# Patient Record
Sex: FEMALE | Race: WHITE | NOT HISPANIC OR LATINO | Employment: OTHER | URBAN - NONMETROPOLITAN AREA
[De-identification: names, ages, dates, MRNs, and addresses within clinical notes are randomized per-mention and may not be internally consistent; named-entity substitution may affect disease eponyms.]

---

## 2017-01-18 ENCOUNTER — TRANSCRIBE ORDERS (OUTPATIENT)
Dept: ADMINISTRATIVE | Facility: HOSPITAL | Age: 72
End: 2017-01-18

## 2017-01-18 DIAGNOSIS — Z85.72 PERSONAL HISTORY OF MALIGNANT LYMPHOMA: Primary | ICD-10-CM

## 2017-02-02 ENCOUNTER — HOSPITAL ENCOUNTER (OUTPATIENT)
Facility: HOSPITAL | Age: 72
Setting detail: SURGERY ADMIT
End: 2017-02-02
Attending: ORTHOPAEDIC SURGERY | Admitting: ORTHOPAEDIC SURGERY

## 2017-02-02 ENCOUNTER — HOSPITAL ENCOUNTER (INPATIENT)
Facility: HOSPITAL | Age: 72
LOS: 7 days | Discharge: HOME HEALTH | End: 2017-02-09
Attending: EMERGENCY MEDICINE | Admitting: ORTHOPAEDIC SURGERY

## 2017-02-02 ENCOUNTER — APPOINTMENT (OUTPATIENT)
Dept: GENERAL RADIOLOGY | Facility: HOSPITAL | Age: 72
End: 2017-02-02

## 2017-02-02 ENCOUNTER — APPOINTMENT (OUTPATIENT)
Dept: CT IMAGING | Facility: HOSPITAL | Age: 72
End: 2017-02-02

## 2017-02-02 ENCOUNTER — ANESTHESIA EVENT (OUTPATIENT)
Dept: PERIOP | Facility: HOSPITAL | Age: 72
End: 2017-02-02

## 2017-02-02 ENCOUNTER — ANESTHESIA (OUTPATIENT)
Dept: PERIOP | Facility: HOSPITAL | Age: 72
End: 2017-02-02

## 2017-02-02 DIAGNOSIS — Z74.09 IMPAIRED MOBILITY AND ADLS: ICD-10-CM

## 2017-02-02 DIAGNOSIS — Z74.09 IMPAIRED FUNCTIONAL MOBILITY, BALANCE, GAIT, AND ENDURANCE: ICD-10-CM

## 2017-02-02 DIAGNOSIS — Z78.9 IMPAIRED MOBILITY AND ADLS: ICD-10-CM

## 2017-02-02 DIAGNOSIS — S72.022A CLOSED DISPLACED FRACTURE OF PROXIMAL EPIPHYSIS OF LEFT FEMUR, INITIAL ENCOUNTER (HCC): Primary | ICD-10-CM

## 2017-02-02 DIAGNOSIS — S62.92XB FRACTURE OF LEFT HAND, OPEN, INITIAL ENCOUNTER: ICD-10-CM

## 2017-02-02 PROBLEM — S72.352A CLOSED DISPLACED COMMINUTED FRACTURE OF SHAFT OF LEFT FEMUR: Status: ACTIVE | Noted: 2017-02-02

## 2017-02-02 PROBLEM — S62.323A CLOSED DISPLACED FRACTURE OF SHAFT OF THIRD METACARPAL BONE OF LEFT HAND: Status: ACTIVE | Noted: 2017-02-02

## 2017-02-02 LAB
ABO GROUP BLD: NORMAL
ALBUMIN SERPL-MCNC: 4.1 G/DL (ref 3.5–5)
ALBUMIN/GLOB SERPL: 1.5 G/DL (ref 1.1–2.5)
ALP SERPL-CCNC: 75 U/L (ref 24–120)
ALT SERPL W P-5'-P-CCNC: 72 U/L (ref 0–54)
ANION GAP SERPL CALCULATED.3IONS-SCNC: 9 MMOL/L (ref 4–13)
APTT PPP: 23.5 SECONDS (ref 24.1–34.8)
AST SERPL-CCNC: 86 U/L (ref 7–45)
BASOPHILS # BLD AUTO: 0.01 10*3/MM3 (ref 0–0.2)
BASOPHILS NFR BLD AUTO: 0.1 % (ref 0–2)
BILIRUB SERPL-MCNC: 0.4 MG/DL (ref 0.1–1)
BLD GP AB SCN SERPL QL: NEGATIVE
BUN BLD-MCNC: 17 MG/DL (ref 5–21)
BUN/CREAT SERPL: 20.7 (ref 7–25)
CALCIUM SPEC-SCNC: 8.3 MG/DL (ref 8.4–10.4)
CHLORIDE SERPL-SCNC: 103 MMOL/L (ref 98–110)
CO2 SERPL-SCNC: 26 MMOL/L (ref 24–31)
CREAT BLD-MCNC: 0.82 MG/DL (ref 0.5–1.4)
DEPRECATED RDW RBC AUTO: 42.7 FL (ref 40–54)
EOSINOPHIL # BLD AUTO: 0.11 10*3/MM3 (ref 0–0.7)
EOSINOPHIL NFR BLD AUTO: 1.2 % (ref 0–4)
ERYTHROCYTE [DISTWIDTH] IN BLOOD BY AUTOMATED COUNT: 12.3 % (ref 12–15)
GFR SERPL CREATININE-BSD FRML MDRD: 69 ML/MIN/1.73
GLOBULIN UR ELPH-MCNC: 2.7 GM/DL
GLUCOSE BLD-MCNC: 103 MG/DL (ref 70–100)
HCT VFR BLD AUTO: 33.2 % (ref 37–47)
HGB BLD-MCNC: 11.2 G/DL (ref 12–16)
IMM GRANULOCYTES # BLD: 0.04 10*3/MM3 (ref 0–0.03)
IMM GRANULOCYTES NFR BLD: 0.4 % (ref 0–5)
INR PPP: 1 (ref 0.91–1.09)
LYMPHOCYTES # BLD AUTO: 1.64 10*3/MM3 (ref 0.72–4.86)
LYMPHOCYTES NFR BLD AUTO: 17.8 % (ref 15–45)
MCH RBC QN AUTO: 32.5 PG (ref 28–32)
MCHC RBC AUTO-ENTMCNC: 33.7 G/DL (ref 33–36)
MCV RBC AUTO: 96.2 FL (ref 82–98)
MONOCYTES # BLD AUTO: 0.57 10*3/MM3 (ref 0.19–1.3)
MONOCYTES NFR BLD AUTO: 6.2 % (ref 4–12)
NEUTROPHILS # BLD AUTO: 6.86 10*3/MM3 (ref 1.87–8.4)
NEUTROPHILS NFR BLD AUTO: 74.3 % (ref 39–78)
PLATELET # BLD AUTO: 218 10*3/MM3 (ref 130–400)
PMV BLD AUTO: 9.4 FL (ref 6–12)
POTASSIUM BLD-SCNC: 4.1 MMOL/L (ref 3.5–5.3)
PROT SERPL-MCNC: 6.8 G/DL (ref 6.3–8.7)
PROTHROMBIN TIME: 13.5 SECONDS (ref 11.9–14.6)
RBC # BLD AUTO: 3.45 10*6/MM3 (ref 4.2–5.4)
RH BLD: POSITIVE
SODIUM BLD-SCNC: 138 MMOL/L (ref 135–145)
WBC NRBC COR # BLD: 9.23 10*3/MM3 (ref 4.8–10.8)

## 2017-02-02 PROCEDURE — 25010000002 FENTANYL CITRATE (PF) 100 MCG/2ML SOLUTION: Performed by: ANESTHESIOLOGY

## 2017-02-02 PROCEDURE — 86900 BLOOD TYPING SEROLOGIC ABO: CPT

## 2017-02-02 PROCEDURE — C1713 ANCHOR/SCREW BN/BN,TIS/BN: HCPCS | Performed by: ORTHOPAEDIC SURGERY

## 2017-02-02 PROCEDURE — 25010000002 MIDAZOLAM PER 1 MG: Performed by: ANESTHESIOLOGY

## 2017-02-02 PROCEDURE — 85025 COMPLETE CBC W/AUTO DIFF WBC: CPT | Performed by: EMERGENCY MEDICINE

## 2017-02-02 PROCEDURE — 71010 HC CHEST PA OR AP: CPT

## 2017-02-02 PROCEDURE — 25010000002 ONDANSETRON PER 1 MG: Performed by: ANESTHESIOLOGY

## 2017-02-02 PROCEDURE — 25010000002 DEXAMETHASONE PER 1 MG: Performed by: ANESTHESIOLOGY

## 2017-02-02 PROCEDURE — 25010000002 ONDANSETRON PER 1 MG: Performed by: NURSE ANESTHETIST, CERTIFIED REGISTERED

## 2017-02-02 PROCEDURE — 99285 EMERGENCY DEPT VISIT HI MDM: CPT

## 2017-02-02 PROCEDURE — 73130 X-RAY EXAM OF HAND: CPT

## 2017-02-02 PROCEDURE — 73120 X-RAY EXAM OF HAND: CPT

## 2017-02-02 PROCEDURE — 25010000003 CEFAZOLIN PER 500 MG: Performed by: NURSE ANESTHETIST, CERTIFIED REGISTERED

## 2017-02-02 PROCEDURE — 25010000002 MORPHINE SULFATE (PF) 2 MG/ML SOLUTION: Performed by: ANESTHESIOLOGY

## 2017-02-02 PROCEDURE — 86920 COMPATIBILITY TEST SPIN: CPT

## 2017-02-02 PROCEDURE — 85730 THROMBOPLASTIN TIME PARTIAL: CPT | Performed by: EMERGENCY MEDICINE

## 2017-02-02 PROCEDURE — 25010000002 HYDROMORPHONE PER 4 MG

## 2017-02-02 PROCEDURE — 0QH906Z INSERTION OF INTRAMEDULLARY INTERNAL FIXATION DEVICE INTO LEFT FEMORAL SHAFT, OPEN APPROACH: ICD-10-PCS | Performed by: ORTHOPAEDIC SURGERY

## 2017-02-02 PROCEDURE — 86901 BLOOD TYPING SEROLOGIC RH(D): CPT

## 2017-02-02 PROCEDURE — 94799 UNLISTED PULMONARY SVC/PX: CPT

## 2017-02-02 PROCEDURE — 25010000002 PROPOFOL 10 MG/ML EMULSION: Performed by: NURSE ANESTHETIST, CERTIFIED REGISTERED

## 2017-02-02 PROCEDURE — 25010000002 HYDROMORPHONE PER 4 MG: Performed by: ANESTHESIOLOGY

## 2017-02-02 PROCEDURE — 85610 PROTHROMBIN TIME: CPT | Performed by: EMERGENCY MEDICINE

## 2017-02-02 PROCEDURE — 93005 ELECTROCARDIOGRAM TRACING: CPT | Performed by: EMERGENCY MEDICINE

## 2017-02-02 PROCEDURE — 70450 CT HEAD/BRAIN W/O DYE: CPT

## 2017-02-02 PROCEDURE — 80053 COMPREHEN METABOLIC PANEL: CPT | Performed by: EMERGENCY MEDICINE

## 2017-02-02 PROCEDURE — 0PSQXZZ REPOSITION LEFT METACARPAL, EXTERNAL APPROACH: ICD-10-PCS | Performed by: ORTHOPAEDIC SURGERY

## 2017-02-02 PROCEDURE — 86850 RBC ANTIBODY SCREEN: CPT

## 2017-02-02 PROCEDURE — 93010 ELECTROCARDIOGRAM REPORT: CPT | Performed by: INTERNAL MEDICINE

## 2017-02-02 PROCEDURE — 25010000002 FENTANYL CITRATE (PF) 100 MCG/2ML SOLUTION: Performed by: NURSE ANESTHETIST, CERTIFIED REGISTERED

## 2017-02-02 PROCEDURE — 73552 X-RAY EXAM OF FEMUR 2/>: CPT

## 2017-02-02 PROCEDURE — 72170 X-RAY EXAM OF PELVIS: CPT

## 2017-02-02 PROCEDURE — 76000 FLUOROSCOPY <1 HR PHYS/QHP: CPT

## 2017-02-02 PROCEDURE — 25010000002 HYDROMORPHONE PER 4 MG: Performed by: EMERGENCY MEDICINE

## 2017-02-02 PROCEDURE — 25010000002 SUCCINYLCHOLINE PER 20 MG: Performed by: NURSE ANESTHETIST, CERTIFIED REGISTERED

## 2017-02-02 PROCEDURE — 36415 COLL VENOUS BLD VENIPUNCTURE: CPT | Performed by: EMERGENCY MEDICINE

## 2017-02-02 PROCEDURE — 25010000003 MORPHINE PER 10 MG: Performed by: ORTHOPAEDIC SURGERY

## 2017-02-02 DEVICE — SCRW LK STRDRV TI 5X36MM STRL: Type: IMPLANTABLE DEVICE | Site: HIP | Status: FUNCTIONAL

## 2017-02-02 DEVICE — SCRW LK STRDRV TI 5X40M STRL: Type: IMPLANTABLE DEVICE | Site: HIP | Status: FUNCTIONAL

## 2017-02-02 DEVICE — IMPLANTABLE DEVICE: Type: IMPLANTABLE DEVICE | Site: HIP | Status: FUNCTIONAL

## 2017-02-02 RX ORDER — DEXAMETHASONE SODIUM PHOSPHATE 4 MG/ML
4 INJECTION, SOLUTION INTRA-ARTICULAR; INTRALESIONAL; INTRAMUSCULAR; INTRAVENOUS; SOFT TISSUE ONCE AS NEEDED
Status: COMPLETED | OUTPATIENT
Start: 2017-02-02 | End: 2017-02-02

## 2017-02-02 RX ORDER — MAGNESIUM HYDROXIDE 1200 MG/15ML
LIQUID ORAL AS NEEDED
Status: DISCONTINUED | OUTPATIENT
Start: 2017-02-02 | End: 2017-02-02 | Stop reason: HOSPADM

## 2017-02-02 RX ORDER — ROCURONIUM BROMIDE 10 MG/ML
INJECTION, SOLUTION INTRAVENOUS AS NEEDED
Status: DISCONTINUED | OUTPATIENT
Start: 2017-02-02 | End: 2017-02-02 | Stop reason: SURG

## 2017-02-02 RX ORDER — MEPERIDINE HYDROCHLORIDE 25 MG/ML
12.5 INJECTION INTRAMUSCULAR; INTRAVENOUS; SUBCUTANEOUS
Status: DISCONTINUED | OUTPATIENT
Start: 2017-02-02 | End: 2017-02-02 | Stop reason: HOSPADM

## 2017-02-02 RX ORDER — SUCCINYLCHOLINE CHLORIDE 20 MG/ML
INJECTION INTRAMUSCULAR; INTRAVENOUS AS NEEDED
Status: DISCONTINUED | OUTPATIENT
Start: 2017-02-02 | End: 2017-02-02 | Stop reason: SURG

## 2017-02-02 RX ORDER — ZOLPIDEM TARTRATE 5 MG/1
5 TABLET ORAL NIGHTLY PRN
Status: DISCONTINUED | OUTPATIENT
Start: 2017-02-02 | End: 2017-02-09 | Stop reason: HOSPADM

## 2017-02-02 RX ORDER — FENTANYL CITRATE 50 UG/ML
INJECTION, SOLUTION INTRAMUSCULAR; INTRAVENOUS AS NEEDED
Status: DISCONTINUED | OUTPATIENT
Start: 2017-02-02 | End: 2017-02-02 | Stop reason: SURG

## 2017-02-02 RX ORDER — ONDANSETRON 2 MG/ML
4 INJECTION INTRAMUSCULAR; INTRAVENOUS ONCE AS NEEDED
Status: COMPLETED | OUTPATIENT
Start: 2017-02-02 | End: 2017-02-02

## 2017-02-02 RX ORDER — MORPHINE SULFATE 2 MG/ML
2 INJECTION, SOLUTION INTRAMUSCULAR; INTRAVENOUS
Status: DISCONTINUED | OUTPATIENT
Start: 2017-02-02 | End: 2017-02-02 | Stop reason: HOSPADM

## 2017-02-02 RX ORDER — SODIUM CHLORIDE, SODIUM LACTATE, POTASSIUM CHLORIDE, CALCIUM CHLORIDE 600; 310; 30; 20 MG/100ML; MG/100ML; MG/100ML; MG/100ML
9 INJECTION, SOLUTION INTRAVENOUS CONTINUOUS
Status: DISCONTINUED | OUTPATIENT
Start: 2017-02-02 | End: 2017-02-09 | Stop reason: HOSPADM

## 2017-02-02 RX ORDER — SODIUM CHLORIDE 0.9 % (FLUSH) 0.9 %
1-10 SYRINGE (ML) INJECTION AS NEEDED
Status: DISCONTINUED | OUTPATIENT
Start: 2017-02-02 | End: 2017-02-02 | Stop reason: HOSPADM

## 2017-02-02 RX ORDER — HYDRALAZINE HYDROCHLORIDE 20 MG/ML
5 INJECTION INTRAMUSCULAR; INTRAVENOUS
Status: DISCONTINUED | OUTPATIENT
Start: 2017-02-02 | End: 2017-02-02 | Stop reason: HOSPADM

## 2017-02-02 RX ORDER — PHENYLEPHRINE HCL IN 0.9% NACL 0.8MG/10ML
SYRINGE (ML) INTRAVENOUS AS NEEDED
Status: DISCONTINUED | OUTPATIENT
Start: 2017-02-02 | End: 2017-02-02 | Stop reason: SURG

## 2017-02-02 RX ORDER — DOCUSATE SODIUM 100 MG/1
100 CAPSULE, LIQUID FILLED ORAL 2 TIMES DAILY PRN
Status: DISCONTINUED | OUTPATIENT
Start: 2017-02-02 | End: 2017-02-09 | Stop reason: HOSPADM

## 2017-02-02 RX ORDER — CARVEDILOL 3.12 MG/1
3.12 TABLET ORAL EVERY 12 HOURS SCHEDULED
Status: DISCONTINUED | OUTPATIENT
Start: 2017-02-02 | End: 2017-02-09 | Stop reason: HOSPADM

## 2017-02-02 RX ORDER — SPIRONOLACTONE 25 MG/1
25 TABLET ORAL DAILY
COMMUNITY
End: 2020-08-03

## 2017-02-02 RX ORDER — NALOXONE HCL 0.4 MG/ML
0.1 VIAL (ML) INJECTION
Status: DISCONTINUED | OUTPATIENT
Start: 2017-02-02 | End: 2017-02-09 | Stop reason: HOSPADM

## 2017-02-02 RX ORDER — LIDOCAINE HYDROCHLORIDE 40 MG/ML
SOLUTION TOPICAL AS NEEDED
Status: DISCONTINUED | OUTPATIENT
Start: 2017-02-02 | End: 2017-02-02 | Stop reason: SURG

## 2017-02-02 RX ORDER — SPIRONOLACTONE 25 MG/1
25 TABLET ORAL DAILY
Status: DISCONTINUED | OUTPATIENT
Start: 2017-02-03 | End: 2017-02-09 | Stop reason: HOSPADM

## 2017-02-02 RX ORDER — OXYCODONE AND ACETAMINOPHEN 10; 325 MG/1; MG/1
1 TABLET ORAL EVERY 4 HOURS PRN
Status: DISCONTINUED | OUTPATIENT
Start: 2017-02-02 | End: 2017-02-09 | Stop reason: HOSPADM

## 2017-02-02 RX ORDER — ZOLPIDEM TARTRATE 5 MG/1
5 TABLET ORAL NIGHTLY PRN
COMMUNITY
End: 2020-08-03 | Stop reason: SDUPTHER

## 2017-02-02 RX ORDER — LIDOCAINE HYDROCHLORIDE 20 MG/ML
INJECTION, SOLUTION INFILTRATION; PERINEURAL AS NEEDED
Status: DISCONTINUED | OUTPATIENT
Start: 2017-02-02 | End: 2017-02-02 | Stop reason: SURG

## 2017-02-02 RX ORDER — ONDANSETRON 2 MG/ML
INJECTION INTRAMUSCULAR; INTRAVENOUS AS NEEDED
Status: DISCONTINUED | OUTPATIENT
Start: 2017-02-02 | End: 2017-02-02 | Stop reason: SURG

## 2017-02-02 RX ORDER — MIDAZOLAM HYDROCHLORIDE 1 MG/ML
1 INJECTION INTRAMUSCULAR; INTRAVENOUS
Status: DISCONTINUED | OUTPATIENT
Start: 2017-02-02 | End: 2017-02-02 | Stop reason: HOSPADM

## 2017-02-02 RX ORDER — OXYCODONE AND ACETAMINOPHEN 10; 325 MG/1; MG/1
1 TABLET ORAL EVERY 4 HOURS PRN
COMMUNITY
End: 2017-02-09 | Stop reason: HOSPADM

## 2017-02-02 RX ORDER — FENTANYL CITRATE 50 UG/ML
25 INJECTION, SOLUTION INTRAMUSCULAR; INTRAVENOUS
Status: DISCONTINUED | OUTPATIENT
Start: 2017-02-02 | End: 2017-02-02 | Stop reason: HOSPADM

## 2017-02-02 RX ORDER — LISINOPRIL 5 MG/1
5 TABLET ORAL
Status: DISCONTINUED | OUTPATIENT
Start: 2017-02-03 | End: 2017-02-09 | Stop reason: HOSPADM

## 2017-02-02 RX ORDER — MORPHINE SULFATE 1 MG/ML
INJECTION, SOLUTION INTRAVENOUS CONTINUOUS
Status: DISCONTINUED | OUTPATIENT
Start: 2017-02-02 | End: 2017-02-06

## 2017-02-02 RX ORDER — WARFARIN SODIUM 3 MG/1
3 TABLET ORAL
Status: DISCONTINUED | OUTPATIENT
Start: 2017-02-02 | End: 2017-02-04

## 2017-02-02 RX ORDER — NALOXONE HCL 0.4 MG/ML
0.4 VIAL (ML) INJECTION AS NEEDED
Status: DISCONTINUED | OUTPATIENT
Start: 2017-02-02 | End: 2017-02-02 | Stop reason: HOSPADM

## 2017-02-02 RX ORDER — CEFAZOLIN SODIUM 1 G/3ML
INJECTION, POWDER, FOR SOLUTION INTRAMUSCULAR; INTRAVENOUS AS NEEDED
Status: DISCONTINUED | OUTPATIENT
Start: 2017-02-02 | End: 2017-02-02 | Stop reason: SURG

## 2017-02-02 RX ORDER — CARVEDILOL 12.5 MG/1
1 TABLET ORAL DAILY
COMMUNITY
End: 2022-01-04 | Stop reason: ALTCHOICE

## 2017-02-02 RX ORDER — ONDANSETRON 2 MG/ML
4 INJECTION INTRAMUSCULAR; INTRAVENOUS EVERY 6 HOURS PRN
Status: DISCONTINUED | OUTPATIENT
Start: 2017-02-02 | End: 2017-02-09 | Stop reason: HOSPADM

## 2017-02-02 RX ORDER — IPRATROPIUM BROMIDE AND ALBUTEROL SULFATE 2.5; .5 MG/3ML; MG/3ML
3 SOLUTION RESPIRATORY (INHALATION) ONCE AS NEEDED
Status: DISCONTINUED | OUTPATIENT
Start: 2017-02-02 | End: 2017-02-02 | Stop reason: HOSPADM

## 2017-02-02 RX ORDER — LABETALOL HYDROCHLORIDE 5 MG/ML
5 INJECTION, SOLUTION INTRAVENOUS
Status: DISCONTINUED | OUTPATIENT
Start: 2017-02-02 | End: 2017-02-02 | Stop reason: HOSPADM

## 2017-02-02 RX ORDER — SODIUM CHLORIDE, SODIUM LACTATE, POTASSIUM CHLORIDE, CALCIUM CHLORIDE 600; 310; 30; 20 MG/100ML; MG/100ML; MG/100ML; MG/100ML
50 INJECTION, SOLUTION INTRAVENOUS CONTINUOUS
Status: DISCONTINUED | OUTPATIENT
Start: 2017-02-02 | End: 2017-02-04

## 2017-02-02 RX ORDER — DEXTROSE MONOHYDRATE 25 G/50ML
12.5 INJECTION, SOLUTION INTRAVENOUS AS NEEDED
Status: DISCONTINUED | OUTPATIENT
Start: 2017-02-02 | End: 2017-02-02 | Stop reason: HOSPADM

## 2017-02-02 RX ORDER — LIDOCAINE HYDROCHLORIDE 10 MG/ML
10 INJECTION, SOLUTION INFILTRATION; PERINEURAL ONCE
Status: COMPLETED | OUTPATIENT
Start: 2017-02-02 | End: 2017-02-02

## 2017-02-02 RX ORDER — MIDAZOLAM HYDROCHLORIDE 1 MG/ML
2 INJECTION INTRAMUSCULAR; INTRAVENOUS
Status: DISCONTINUED | OUTPATIENT
Start: 2017-02-02 | End: 2017-02-02 | Stop reason: HOSPADM

## 2017-02-02 RX ORDER — PROPOFOL 10 MG/ML
VIAL (ML) INTRAVENOUS AS NEEDED
Status: DISCONTINUED | OUTPATIENT
Start: 2017-02-02 | End: 2017-02-02 | Stop reason: SURG

## 2017-02-02 RX ORDER — DIPHENHYDRAMINE HYDROCHLORIDE 50 MG/ML
12.5 INJECTION INTRAMUSCULAR; INTRAVENOUS
Status: DISCONTINUED | OUTPATIENT
Start: 2017-02-02 | End: 2017-02-02 | Stop reason: HOSPADM

## 2017-02-02 RX ORDER — FENTANYL CITRATE 50 UG/ML
25 INJECTION, SOLUTION INTRAMUSCULAR; INTRAVENOUS
Status: COMPLETED | OUTPATIENT
Start: 2017-02-02 | End: 2017-02-02

## 2017-02-02 RX ORDER — ACETAMINOPHEN 10 MG/ML
1000 INJECTION, SOLUTION INTRAVENOUS ONCE
Status: COMPLETED | OUTPATIENT
Start: 2017-02-02 | End: 2017-02-02

## 2017-02-02 RX ORDER — METOPROLOL TARTRATE 5 MG/5ML
2.5 INJECTION INTRAVENOUS
Status: DISCONTINUED | OUTPATIENT
Start: 2017-02-02 | End: 2017-02-02 | Stop reason: HOSPADM

## 2017-02-02 RX ADMIN — CEFAZOLIN 1 G: 1 INJECTION, POWDER, FOR SOLUTION INTRAMUSCULAR; INTRAVENOUS; PARENTERAL at 14:10

## 2017-02-02 RX ADMIN — ONDANSETRON HYDROCHLORIDE 4 MG: 2 SOLUTION INTRAMUSCULAR; INTRAVENOUS at 19:39

## 2017-02-02 RX ADMIN — SODIUM CHLORIDE, POTASSIUM CHLORIDE, SODIUM LACTATE AND CALCIUM CHLORIDE 9 ML/HR: 600; 310; 30; 20 INJECTION, SOLUTION INTRAVENOUS at 15:49

## 2017-02-02 RX ADMIN — DEXAMETHASONE SODIUM PHOSPHATE 4 MG: 4 INJECTION, SOLUTION INTRA-ARTICULAR; INTRALESIONAL; INTRAMUSCULAR; INTRAVENOUS; SOFT TISSUE at 15:50

## 2017-02-02 RX ADMIN — LIDOCAINE HYDROCHLORIDE 10 ML: 10 INJECTION, SOLUTION INFILTRATION; PERINEURAL at 14:35

## 2017-02-02 RX ADMIN — Medication 80 MCG: at 16:53

## 2017-02-02 RX ADMIN — MORPHINE SULFATE 2 MG: 2 INJECTION, SOLUTION INTRAMUSCULAR; INTRAVENOUS at 19:22

## 2017-02-02 RX ADMIN — HYDROMORPHONE HYDROCHLORIDE 0.2 MG: 1 INJECTION, SOLUTION INTRAMUSCULAR; INTRAVENOUS; SUBCUTANEOUS at 19:08

## 2017-02-02 RX ADMIN — LIDOCAINE HYDROCHLORIDE 1 EACH: 40 SOLUTION TOPICAL at 16:46

## 2017-02-02 RX ADMIN — SUCCINYLCHOLINE CHLORIDE 60 MG: 20 INJECTION, SOLUTION INTRAMUSCULAR; INTRAVENOUS at 16:45

## 2017-02-02 RX ADMIN — LIDOCAINE HYDROCHLORIDE 50 MG: 20 INJECTION, SOLUTION INFILTRATION; PERINEURAL at 16:44

## 2017-02-02 RX ADMIN — HYDROMORPHONE HYDROCHLORIDE 0.2 MG: 1 INJECTION, SOLUTION INTRAMUSCULAR; INTRAVENOUS; SUBCUTANEOUS at 19:13

## 2017-02-02 RX ADMIN — FENTANYL CITRATE 75 MCG: 50 INJECTION INTRAMUSCULAR; INTRAVENOUS at 16:44

## 2017-02-02 RX ADMIN — CEFAZOLIN 1 G: 1 INJECTION, POWDER, FOR SOLUTION INTRAVENOUS at 17:14

## 2017-02-02 RX ADMIN — SODIUM CHLORIDE, POTASSIUM CHLORIDE, SODIUM LACTATE AND CALCIUM CHLORIDE 50 ML/HR: 600; 310; 30; 20 INJECTION, SOLUTION INTRAVENOUS at 20:46

## 2017-02-02 RX ADMIN — PROPOFOL 100 MG: 10 INJECTION, EMULSION INTRAVENOUS at 16:44

## 2017-02-02 RX ADMIN — FENTANYL CITRATE 75 MCG: 50 INJECTION INTRAMUSCULAR; INTRAVENOUS at 17:15

## 2017-02-02 RX ADMIN — FENTANYL CITRATE 25 MCG: 50 INJECTION INTRAMUSCULAR; INTRAVENOUS at 19:41

## 2017-02-02 RX ADMIN — FENTANYL CITRATE 100 MCG: 50 INJECTION INTRAMUSCULAR; INTRAVENOUS at 17:50

## 2017-02-02 RX ADMIN — FENTANYL CITRATE 25 MCG: 50 INJECTION INTRAMUSCULAR; INTRAVENOUS at 19:33

## 2017-02-02 RX ADMIN — CEFAZOLIN SODIUM 1 G: 1 INJECTION, SOLUTION INTRAVENOUS at 23:38

## 2017-02-02 RX ADMIN — CARVEDILOL 3.12 MG: 3.12 TABLET, FILM COATED ORAL at 23:06

## 2017-02-02 RX ADMIN — Medication 80 MCG: at 17:00

## 2017-02-02 RX ADMIN — FENTANYL CITRATE 25 MCG: 50 INJECTION, SOLUTION INTRAMUSCULAR; INTRAVENOUS at 16:32

## 2017-02-02 RX ADMIN — Medication 80 MCG: at 17:04

## 2017-02-02 RX ADMIN — Medication 80 MCG: at 16:57

## 2017-02-02 RX ADMIN — Medication: at 20:45

## 2017-02-02 RX ADMIN — Medication 80 MCG: at 17:55

## 2017-02-02 RX ADMIN — MIDAZOLAM HYDROCHLORIDE 2 MG: 1 INJECTION, SOLUTION INTRAMUSCULAR; INTRAVENOUS at 15:50

## 2017-02-02 RX ADMIN — Medication 80 MCG: at 17:08

## 2017-02-02 RX ADMIN — Medication 80 MCG: at 17:10

## 2017-02-02 RX ADMIN — OXYCODONE HYDROCHLORIDE AND ACETAMINOPHEN 1 TABLET: 10; 325 TABLET ORAL at 23:06

## 2017-02-02 RX ADMIN — ACETAMINOPHEN 1000 MG: 10 INJECTION, SOLUTION INTRAVENOUS at 15:49

## 2017-02-02 RX ADMIN — ROCURONIUM BROMIDE 30 MG: 10 INJECTION INTRAVENOUS at 16:44

## 2017-02-02 RX ADMIN — FENTANYL CITRATE 25 MCG: 50 INJECTION INTRAMUSCULAR; INTRAVENOUS at 19:23

## 2017-02-02 RX ADMIN — FENTANYL CITRATE 25 MCG: 50 INJECTION INTRAMUSCULAR; INTRAVENOUS at 19:28

## 2017-02-02 RX ADMIN — HYDROMORPHONE HYDROCHLORIDE 1 MG: 1 INJECTION, SOLUTION INTRAMUSCULAR; INTRAVENOUS; SUBCUTANEOUS at 13:18

## 2017-02-02 RX ADMIN — WARFARIN SODIUM 3 MG: 3 TABLET ORAL at 21:51

## 2017-02-02 RX ADMIN — ONDANSETRON HYDROCHLORIDE 4 MG: 2 SOLUTION INTRAMUSCULAR; INTRAVENOUS at 18:15

## 2017-02-02 RX ADMIN — SODIUM CHLORIDE, POTASSIUM CHLORIDE, SODIUM LACTATE AND CALCIUM CHLORIDE: 600; 310; 30; 20 INJECTION, SOLUTION INTRAVENOUS at 18:16

## 2017-02-02 NOTE — ANESTHESIA PREPROCEDURE EVALUATION
Anesthesia Evaluation     Patient summary reviewed    No history of anesthetic complications   Airway   Mallampati: II  TM distance: >3 FB  Neck ROM: full  Dental    (+) upper dentures and lower dentures    Pulmonary    (-) asthma, sleep apnea, not a smoker  Cardiovascular   Exercise tolerance: good (4-7 METS)  (+) hypertension, CHF (h/o cardiomyopathy from chemo.  recovered to normal.),   (-) pacemaker, past MI, angina, cardiac stents    ECG reviewed  Patient on routine beta blocker and Beta blocker given within 24 hours of surgery    Neuro/Psych  (-) seizures, TIA, CVA  GI/Hepatic/Renal/Endo    (-) liver disease, renal disease, diabetes    Musculoskeletal     Abdominal    Substance History      OB/GYN          Other                             Anesthesia Plan    ASA 2 - emergent     general     intravenous induction   Anesthetic plan and risks discussed with patient.

## 2017-02-03 LAB
HCT VFR BLD AUTO: 22.4 % (ref 37–47)
HGB BLD-MCNC: 7.7 G/DL (ref 12–16)
INR PPP: 1.16 (ref 0.91–1.09)
PROTHROMBIN TIME: 15.2 SECONDS (ref 11.9–14.6)

## 2017-02-03 PROCEDURE — 36430 TRANSFUSION BLD/BLD COMPNT: CPT

## 2017-02-03 PROCEDURE — 85014 HEMATOCRIT: CPT | Performed by: ORTHOPAEDIC SURGERY

## 2017-02-03 PROCEDURE — 85018 HEMOGLOBIN: CPT | Performed by: ORTHOPAEDIC SURGERY

## 2017-02-03 PROCEDURE — G8978 MOBILITY CURRENT STATUS: HCPCS | Performed by: PHYSICAL THERAPIST

## 2017-02-03 PROCEDURE — 97530 THERAPEUTIC ACTIVITIES: CPT

## 2017-02-03 PROCEDURE — P9016 RBC LEUKOCYTES REDUCED: HCPCS

## 2017-02-03 PROCEDURE — 86900 BLOOD TYPING SEROLOGIC ABO: CPT

## 2017-02-03 PROCEDURE — 25010000003 CEFAZOLIN PER 500 MG: Performed by: ORTHOPAEDIC SURGERY

## 2017-02-03 PROCEDURE — 97162 PT EVAL MOD COMPLEX 30 MIN: CPT

## 2017-02-03 PROCEDURE — 85610 PROTHROMBIN TIME: CPT | Performed by: ORTHOPAEDIC SURGERY

## 2017-02-03 PROCEDURE — 97167 OT EVAL HIGH COMPLEX 60 MIN: CPT | Performed by: OCCUPATIONAL THERAPIST

## 2017-02-03 PROCEDURE — G8987 SELF CARE CURRENT STATUS: HCPCS | Performed by: OCCUPATIONAL THERAPIST

## 2017-02-03 PROCEDURE — G8988 SELF CARE GOAL STATUS: HCPCS | Performed by: OCCUPATIONAL THERAPIST

## 2017-02-03 PROCEDURE — G8979 MOBILITY GOAL STATUS: HCPCS | Performed by: PHYSICAL THERAPIST

## 2017-02-03 RX ADMIN — CEFAZOLIN SODIUM 1 G: 1 INJECTION, SOLUTION INTRAVENOUS at 08:45

## 2017-02-03 RX ADMIN — WARFARIN SODIUM 3 MG: 3 TABLET ORAL at 17:54

## 2017-02-03 RX ADMIN — LISINOPRIL 5 MG: 5 TABLET ORAL at 08:45

## 2017-02-03 RX ADMIN — ZOLPIDEM TARTRATE 5 MG: 5 TABLET, FILM COATED ORAL at 21:25

## 2017-02-03 RX ADMIN — OXYCODONE HYDROCHLORIDE AND ACETAMINOPHEN 1 TABLET: 10; 325 TABLET ORAL at 21:25

## 2017-02-03 RX ADMIN — CARVEDILOL 3.12 MG: 3.12 TABLET, FILM COATED ORAL at 08:45

## 2017-02-03 RX ADMIN — OXYCODONE HYDROCHLORIDE AND ACETAMINOPHEN 1 TABLET: 10; 325 TABLET ORAL at 10:02

## 2017-02-03 RX ADMIN — OXYCODONE HYDROCHLORIDE AND ACETAMINOPHEN 1 TABLET: 10; 325 TABLET ORAL at 16:45

## 2017-02-03 RX ADMIN — SPIRONOLACTONE 25 MG: 25 TABLET ORAL at 08:46

## 2017-02-03 RX ADMIN — OXYCODONE HYDROCHLORIDE AND ACETAMINOPHEN 1 TABLET: 10; 325 TABLET ORAL at 03:44

## 2017-02-03 NOTE — ANESTHESIA POSTPROCEDURE EVALUATION
Patient: Mitzi Villafuerte    Procedure Summary     Date Anesthesia Start Anesthesia Stop Room / Location    02/02/17 1639 1840 [facility] / [facility]       Procedure Diagnosis Surgeon Provider    HIP TROCANTERIC NAILING LONG WITH INTRAMEDULLARY HIP SCREW WITH CAST APPLICATION TO LEFT HAND (Left Hip) (FRACTURED LEFT HIP) MD Lucio Franco CRNA          Anesthesia Type: general  Last vitals  /60 (02/02/17 1935)    Temp      Pulse 91 (02/02/17 1935)   Resp 17 (02/02/17 1935)    SpO2 100 % (02/02/17 1935)      Post Anesthesia Care and Evaluation    Patient location during evaluation: PACU  Patient participation: complete - patient participated  Level of consciousness: awake  Pain score: 4  Pain management: adequate  Airway patency: patent  Anesthetic complications: No anesthetic complications  PONV Status: none  Cardiovascular status: acceptable  Respiratory status: acceptable  Hydration status: acceptable

## 2017-02-04 LAB
ABO + RH BLD: NORMAL
BH BB BLOOD EXPIRATION DATE: NORMAL
BH BB BLOOD TYPE BARCODE: 5100
BH BB DISPENSE STATUS: NORMAL
BH BB PRODUCT CODE: NORMAL
BH BB UNIT NUMBER: NORMAL
CROSSMATCH INTERPRETATION: NORMAL
HCT VFR BLD AUTO: 28.9 % (ref 37–47)
HGB BLD-MCNC: 9.8 G/DL (ref 12–16)
INR PPP: 1.69 (ref 0.91–1.09)
PROTHROMBIN TIME: 20.5 SECONDS (ref 11.9–14.6)
UNIT  ABO: NORMAL
UNIT  RH: NORMAL

## 2017-02-04 PROCEDURE — 97530 THERAPEUTIC ACTIVITIES: CPT

## 2017-02-04 PROCEDURE — 85610 PROTHROMBIN TIME: CPT | Performed by: ORTHOPAEDIC SURGERY

## 2017-02-04 PROCEDURE — 85014 HEMATOCRIT: CPT | Performed by: ORTHOPAEDIC SURGERY

## 2017-02-04 PROCEDURE — 86920 COMPATIBILITY TEST SPIN: CPT

## 2017-02-04 PROCEDURE — 85018 HEMOGLOBIN: CPT | Performed by: ORTHOPAEDIC SURGERY

## 2017-02-04 RX ORDER — WARFARIN SODIUM 1 MG/1
1 TABLET ORAL
Status: DISCONTINUED | OUTPATIENT
Start: 2017-02-04 | End: 2017-02-09 | Stop reason: HOSPADM

## 2017-02-04 RX ORDER — POLYETHYLENE GLYCOL 3350 17 G/17G
17 POWDER, FOR SOLUTION ORAL DAILY
Status: DISCONTINUED | OUTPATIENT
Start: 2017-02-04 | End: 2017-02-09 | Stop reason: HOSPADM

## 2017-02-04 RX ADMIN — SODIUM CHLORIDE, POTASSIUM CHLORIDE, SODIUM LACTATE AND CALCIUM CHLORIDE 50 ML/HR: 600; 310; 30; 20 INJECTION, SOLUTION INTRAVENOUS at 02:07

## 2017-02-04 RX ADMIN — OXYCODONE HYDROCHLORIDE AND ACETAMINOPHEN 1 TABLET: 10; 325 TABLET ORAL at 09:54

## 2017-02-04 RX ADMIN — OXYCODONE HYDROCHLORIDE AND ACETAMINOPHEN 1 TABLET: 10; 325 TABLET ORAL at 14:10

## 2017-02-04 RX ADMIN — OXYCODONE HYDROCHLORIDE AND ACETAMINOPHEN 1 TABLET: 10; 325 TABLET ORAL at 06:07

## 2017-02-04 RX ADMIN — OXYCODONE HYDROCHLORIDE AND ACETAMINOPHEN 1 TABLET: 10; 325 TABLET ORAL at 18:02

## 2017-02-04 RX ADMIN — SPIRONOLACTONE 25 MG: 25 TABLET ORAL at 09:05

## 2017-02-04 RX ADMIN — OXYCODONE HYDROCHLORIDE AND ACETAMINOPHEN 1 TABLET: 10; 325 TABLET ORAL at 02:07

## 2017-02-04 RX ADMIN — ZOLPIDEM TARTRATE 5 MG: 5 TABLET, FILM COATED ORAL at 20:56

## 2017-02-04 RX ADMIN — CARVEDILOL 3.12 MG: 3.12 TABLET, FILM COATED ORAL at 08:59

## 2017-02-04 RX ADMIN — WARFARIN SODIUM 1 MG: 1 TABLET ORAL at 18:02

## 2017-02-05 LAB
HCT VFR BLD AUTO: 28.4 % (ref 37–47)
HGB BLD-MCNC: 9.7 G/DL (ref 12–16)
INR PPP: 1.65 (ref 0.91–1.09)
PROTHROMBIN TIME: 20.1 SECONDS (ref 11.9–14.6)

## 2017-02-05 PROCEDURE — 25010000003 MORPHINE PER 10 MG: Performed by: ORTHOPAEDIC SURGERY

## 2017-02-05 PROCEDURE — 97116 GAIT TRAINING THERAPY: CPT

## 2017-02-05 PROCEDURE — 85610 PROTHROMBIN TIME: CPT | Performed by: ORTHOPAEDIC SURGERY

## 2017-02-05 PROCEDURE — 85018 HEMOGLOBIN: CPT | Performed by: ORTHOPAEDIC SURGERY

## 2017-02-05 PROCEDURE — 97530 THERAPEUTIC ACTIVITIES: CPT

## 2017-02-05 PROCEDURE — 85014 HEMATOCRIT: CPT | Performed by: ORTHOPAEDIC SURGERY

## 2017-02-05 RX ADMIN — OXYCODONE HYDROCHLORIDE AND ACETAMINOPHEN 1 TABLET: 10; 325 TABLET ORAL at 22:07

## 2017-02-05 RX ADMIN — CARVEDILOL 3.12 MG: 3.12 TABLET, FILM COATED ORAL at 08:45

## 2017-02-05 RX ADMIN — OXYCODONE HYDROCHLORIDE AND ACETAMINOPHEN 1 TABLET: 10; 325 TABLET ORAL at 17:50

## 2017-02-05 RX ADMIN — Medication: at 06:01

## 2017-02-05 RX ADMIN — OXYCODONE HYDROCHLORIDE AND ACETAMINOPHEN 1 TABLET: 10; 325 TABLET ORAL at 09:52

## 2017-02-05 RX ADMIN — ZOLPIDEM TARTRATE 5 MG: 5 TABLET, FILM COATED ORAL at 22:07

## 2017-02-05 RX ADMIN — OXYCODONE HYDROCHLORIDE AND ACETAMINOPHEN 1 TABLET: 10; 325 TABLET ORAL at 05:56

## 2017-02-05 RX ADMIN — POLYETHYLENE GLYCOL 3350 17 G: 17 POWDER, FOR SOLUTION ORAL at 08:44

## 2017-02-05 RX ADMIN — OXYCODONE HYDROCHLORIDE AND ACETAMINOPHEN 1 TABLET: 10; 325 TABLET ORAL at 14:15

## 2017-02-05 RX ADMIN — OXYCODONE HYDROCHLORIDE AND ACETAMINOPHEN 1 TABLET: 10; 325 TABLET ORAL at 02:00

## 2017-02-05 RX ADMIN — SPIRONOLACTONE 25 MG: 25 TABLET ORAL at 08:45

## 2017-02-05 RX ADMIN — WARFARIN SODIUM 1 MG: 1 TABLET ORAL at 17:49

## 2017-02-05 RX ADMIN — LISINOPRIL 5 MG: 5 TABLET ORAL at 08:45

## 2017-02-06 LAB
ABO + RH BLD: NORMAL
BH BB BLOOD EXPIRATION DATE: NORMAL
BH BB BLOOD TYPE BARCODE: 5100
BH BB DISPENSE STATUS: NORMAL
BH BB PRODUCT CODE: NORMAL
BH BB UNIT NUMBER: NORMAL
CROSSMATCH INTERPRETATION: NORMAL
INR PPP: 1.65 (ref 0.91–1.09)
PROTHROMBIN TIME: 20.1 SECONDS (ref 11.9–14.6)
UNIT  ABO: NORMAL
UNIT  RH: NORMAL

## 2017-02-06 PROCEDURE — 97116 GAIT TRAINING THERAPY: CPT | Performed by: PHYSICAL THERAPIST

## 2017-02-06 PROCEDURE — 97116 GAIT TRAINING THERAPY: CPT

## 2017-02-06 PROCEDURE — 97535 SELF CARE MNGMENT TRAINING: CPT

## 2017-02-06 PROCEDURE — 85610 PROTHROMBIN TIME: CPT | Performed by: ORTHOPAEDIC SURGERY

## 2017-02-06 PROCEDURE — 97530 THERAPEUTIC ACTIVITIES: CPT | Performed by: PHYSICAL THERAPIST

## 2017-02-06 RX ORDER — ESCITALOPRAM OXALATE 10 MG/1
10 TABLET ORAL DAILY
COMMUNITY
End: 2020-08-06

## 2017-02-06 RX ADMIN — SODIUM CHLORIDE, POTASSIUM CHLORIDE, SODIUM LACTATE AND CALCIUM CHLORIDE 9 ML/HR: 600; 310; 30; 20 INJECTION, SOLUTION INTRAVENOUS at 05:43

## 2017-02-06 RX ADMIN — ZOLPIDEM TARTRATE 5 MG: 5 TABLET, FILM COATED ORAL at 21:22

## 2017-02-06 RX ADMIN — OXYCODONE HYDROCHLORIDE AND ACETAMINOPHEN 1 TABLET: 10; 325 TABLET ORAL at 17:14

## 2017-02-06 RX ADMIN — CARVEDILOL 3.12 MG: 3.12 TABLET, FILM COATED ORAL at 08:37

## 2017-02-06 RX ADMIN — OXYCODONE HYDROCHLORIDE AND ACETAMINOPHEN 1 TABLET: 10; 325 TABLET ORAL at 03:01

## 2017-02-06 RX ADMIN — SPIRONOLACTONE 25 MG: 25 TABLET ORAL at 08:37

## 2017-02-06 RX ADMIN — OXYCODONE HYDROCHLORIDE AND ACETAMINOPHEN 1 TABLET: 10; 325 TABLET ORAL at 07:07

## 2017-02-06 RX ADMIN — CARVEDILOL 3.12 MG: 3.12 TABLET, FILM COATED ORAL at 21:22

## 2017-02-06 RX ADMIN — OXYCODONE HYDROCHLORIDE AND ACETAMINOPHEN 1 TABLET: 10; 325 TABLET ORAL at 13:07

## 2017-02-06 RX ADMIN — OXYCODONE HYDROCHLORIDE AND ACETAMINOPHEN 1 TABLET: 10; 325 TABLET ORAL at 21:22

## 2017-02-06 RX ADMIN — LISINOPRIL 5 MG: 5 TABLET ORAL at 08:37

## 2017-02-07 LAB
INR PPP: 1.54 (ref 0.91–1.09)
PROTHROMBIN TIME: 19 SECONDS (ref 11.9–14.6)

## 2017-02-07 PROCEDURE — 25010000002 ONDANSETRON PER 1 MG: Performed by: ORTHOPAEDIC SURGERY

## 2017-02-07 PROCEDURE — 97535 SELF CARE MNGMENT TRAINING: CPT

## 2017-02-07 PROCEDURE — 85610 PROTHROMBIN TIME: CPT | Performed by: ORTHOPAEDIC SURGERY

## 2017-02-07 PROCEDURE — 97116 GAIT TRAINING THERAPY: CPT

## 2017-02-07 RX ADMIN — OXYCODONE HYDROCHLORIDE AND ACETAMINOPHEN 1 TABLET: 10; 325 TABLET ORAL at 18:38

## 2017-02-07 RX ADMIN — OXYCODONE HYDROCHLORIDE AND ACETAMINOPHEN 1 TABLET: 10; 325 TABLET ORAL at 06:37

## 2017-02-07 RX ADMIN — WARFARIN SODIUM 1 MG: 1 TABLET ORAL at 17:29

## 2017-02-07 RX ADMIN — OXYCODONE HYDROCHLORIDE AND ACETAMINOPHEN 1 TABLET: 10; 325 TABLET ORAL at 22:28

## 2017-02-07 RX ADMIN — CARVEDILOL 3.12 MG: 3.12 TABLET, FILM COATED ORAL at 08:43

## 2017-02-07 RX ADMIN — OXYCODONE HYDROCHLORIDE AND ACETAMINOPHEN 1 TABLET: 10; 325 TABLET ORAL at 10:25

## 2017-02-07 RX ADMIN — SPIRONOLACTONE 25 MG: 25 TABLET ORAL at 08:43

## 2017-02-07 RX ADMIN — LISINOPRIL 5 MG: 5 TABLET ORAL at 08:42

## 2017-02-07 RX ADMIN — OXYCODONE HYDROCHLORIDE AND ACETAMINOPHEN 1 TABLET: 10; 325 TABLET ORAL at 14:30

## 2017-02-07 RX ADMIN — ONDANSETRON 4 MG: 2 INJECTION INTRAMUSCULAR; INTRAVENOUS at 08:42

## 2017-02-07 RX ADMIN — OXYCODONE HYDROCHLORIDE AND ACETAMINOPHEN 1 TABLET: 10; 325 TABLET ORAL at 02:47

## 2017-02-07 RX ADMIN — MAGNESIUM HYDROXIDE 10 ML: 2400 SUSPENSION ORAL at 06:37

## 2017-02-07 RX ADMIN — ZOLPIDEM TARTRATE 5 MG: 5 TABLET, FILM COATED ORAL at 22:28

## 2017-02-08 LAB
INR PPP: 1.32 (ref 0.91–1.09)
PROTHROMBIN TIME: 16.8 SECONDS (ref 11.9–14.6)

## 2017-02-08 PROCEDURE — 97116 GAIT TRAINING THERAPY: CPT

## 2017-02-08 PROCEDURE — 25010000002 MORPHINE SULFATE (PF) 2 MG/ML SOLUTION

## 2017-02-08 PROCEDURE — 25010000002 ONDANSETRON PER 1 MG: Performed by: ORTHOPAEDIC SURGERY

## 2017-02-08 PROCEDURE — 85610 PROTHROMBIN TIME: CPT | Performed by: ORTHOPAEDIC SURGERY

## 2017-02-08 RX ORDER — MORPHINE SULFATE 2 MG/ML
2 INJECTION, SOLUTION INTRAMUSCULAR; INTRAVENOUS EVERY 4 HOURS PRN
Status: DISCONTINUED | OUTPATIENT
Start: 2017-02-08 | End: 2017-02-09 | Stop reason: HOSPADM

## 2017-02-08 RX ADMIN — WARFARIN SODIUM 1 MG: 1 TABLET ORAL at 17:20

## 2017-02-08 RX ADMIN — OXYCODONE HYDROCHLORIDE AND ACETAMINOPHEN 1 TABLET: 10; 325 TABLET ORAL at 03:07

## 2017-02-08 RX ADMIN — OXYCODONE HYDROCHLORIDE AND ACETAMINOPHEN 1 TABLET: 10; 325 TABLET ORAL at 07:05

## 2017-02-08 RX ADMIN — OXYCODONE HYDROCHLORIDE AND ACETAMINOPHEN 1 TABLET: 10; 325 TABLET ORAL at 15:07

## 2017-02-08 RX ADMIN — MORPHINE SULFATE 2 MG: 2 INJECTION, SOLUTION INTRAMUSCULAR; INTRAVENOUS at 18:40

## 2017-02-08 RX ADMIN — ONDANSETRON 4 MG: 2 INJECTION INTRAMUSCULAR; INTRAVENOUS at 17:20

## 2017-02-08 RX ADMIN — ZOLPIDEM TARTRATE 5 MG: 5 TABLET, FILM COATED ORAL at 20:48

## 2017-02-08 RX ADMIN — OXYCODONE HYDROCHLORIDE AND ACETAMINOPHEN 1 TABLET: 10; 325 TABLET ORAL at 11:10

## 2017-02-08 RX ADMIN — CARVEDILOL 3.12 MG: 3.12 TABLET, FILM COATED ORAL at 20:48

## 2017-02-08 RX ADMIN — OXYCODONE HYDROCHLORIDE AND ACETAMINOPHEN 1 TABLET: 10; 325 TABLET ORAL at 20:48

## 2017-02-08 RX ADMIN — MAGNESIUM HYDROXIDE 10 ML: 2400 SUSPENSION ORAL at 07:06

## 2017-02-09 VITALS
OXYGEN SATURATION: 97 % | HEIGHT: 62 IN | HEART RATE: 74 BPM | DIASTOLIC BLOOD PRESSURE: 48 MMHG | RESPIRATION RATE: 18 BRPM | TEMPERATURE: 98.2 F | BODY MASS INDEX: 21.9 KG/M2 | SYSTOLIC BLOOD PRESSURE: 113 MMHG | WEIGHT: 119 LBS

## 2017-02-09 LAB
INR PPP: 1.14 (ref 0.91–1.09)
PROTHROMBIN TIME: 15 SECONDS (ref 11.9–14.6)

## 2017-02-09 PROCEDURE — 85610 PROTHROMBIN TIME: CPT | Performed by: ORTHOPAEDIC SURGERY

## 2017-02-09 PROCEDURE — 25010000002 MORPHINE SULFATE (PF) 2 MG/ML SOLUTION

## 2017-02-09 RX ORDER — OXYCODONE AND ACETAMINOPHEN 10; 325 MG/1; MG/1
1 TABLET ORAL EVERY 4 HOURS PRN
Qty: 40 TABLET | Refills: 0 | Status: SHIPPED | OUTPATIENT
Start: 2017-02-09 | End: 2020-08-03 | Stop reason: SDUPTHER

## 2017-02-09 RX ORDER — BISACODYL 10 MG
10 SUPPOSITORY, RECTAL RECTAL ONCE
Status: COMPLETED | OUTPATIENT
Start: 2017-02-09 | End: 2017-02-09

## 2017-02-09 RX ADMIN — SPIRONOLACTONE 25 MG: 25 TABLET ORAL at 08:28

## 2017-02-09 RX ADMIN — OXYCODONE HYDROCHLORIDE AND ACETAMINOPHEN 1 TABLET: 10; 325 TABLET ORAL at 00:30

## 2017-02-09 RX ADMIN — OXYCODONE HYDROCHLORIDE AND ACETAMINOPHEN 1 TABLET: 10; 325 TABLET ORAL at 10:24

## 2017-02-09 RX ADMIN — OXYCODONE HYDROCHLORIDE AND ACETAMINOPHEN 1 TABLET: 10; 325 TABLET ORAL at 06:16

## 2017-02-09 RX ADMIN — MORPHINE SULFATE 2 MG: 2 INJECTION, SOLUTION INTRAMUSCULAR; INTRAVENOUS at 03:11

## 2017-02-09 RX ADMIN — BISACODYL 10 MG: 10 SUPPOSITORY RECTAL at 11:00

## 2017-02-27 ENCOUNTER — LAB REQUISITION (OUTPATIENT)
Dept: LAB | Facility: HOSPITAL | Age: 72
End: 2017-02-27

## 2017-02-27 DIAGNOSIS — Z00.00 ENCOUNTER FOR GENERAL ADULT MEDICAL EXAMINATION WITHOUT ABNORMAL FINDINGS: ICD-10-CM

## 2017-02-27 LAB
ALBUMIN SERPL-MCNC: 3.8 G/DL (ref 3.5–5)
ALBUMIN/GLOB SERPL: 1.5 G/DL (ref 1.1–2.5)
ALP SERPL-CCNC: 114 U/L (ref 24–120)
ALT SERPL W P-5'-P-CCNC: 26 U/L (ref 0–54)
ANION GAP SERPL CALCULATED.3IONS-SCNC: 10 MMOL/L (ref 4–13)
AST SERPL-CCNC: 24 U/L (ref 7–45)
BASOPHILS # BLD AUTO: 0.01 10*3/MM3 (ref 0–0.2)
BASOPHILS NFR BLD AUTO: 0.3 % (ref 0–2)
BILIRUB SERPL-MCNC: 0.5 MG/DL (ref 0.1–1)
BUN BLD-MCNC: 17 MG/DL (ref 5–21)
BUN/CREAT SERPL: 29.8 (ref 7–25)
CALCIUM SPEC-SCNC: 9.6 MG/DL (ref 8.4–10.4)
CHLORIDE SERPL-SCNC: 101 MMOL/L (ref 98–110)
CO2 SERPL-SCNC: 27 MMOL/L (ref 24–31)
CREAT BLD-MCNC: 0.57 MG/DL (ref 0.5–1.4)
DEPRECATED RDW RBC AUTO: 47.1 FL (ref 40–54)
EOSINOPHIL # BLD AUTO: 0.17 10*3/MM3 (ref 0–0.7)
EOSINOPHIL NFR BLD AUTO: 4.3 % (ref 0–4)
ERYTHROCYTE [DISTWIDTH] IN BLOOD BY AUTOMATED COUNT: 13.5 % (ref 12–15)
GFR SERPL CREATININE-BSD FRML MDRD: 105 ML/MIN/1.73
GLOBULIN UR ELPH-MCNC: 2.5 GM/DL
GLUCOSE BLD-MCNC: 107 MG/DL (ref 70–100)
HCT VFR BLD AUTO: 33.7 % (ref 37–47)
HGB BLD-MCNC: 11 G/DL (ref 12–16)
IMM GRANULOCYTES # BLD: 0.02 10*3/MM3 (ref 0–0.03)
IMM GRANULOCYTES NFR BLD: 0.5 % (ref 0–5)
LYMPHOCYTES # BLD AUTO: 1.33 10*3/MM3 (ref 0.72–4.86)
LYMPHOCYTES NFR BLD AUTO: 33.4 % (ref 15–45)
MCH RBC QN AUTO: 31.1 PG (ref 28–32)
MCHC RBC AUTO-ENTMCNC: 32.6 G/DL (ref 33–36)
MCV RBC AUTO: 95.2 FL (ref 82–98)
MONOCYTES # BLD AUTO: 0.39 10*3/MM3 (ref 0.19–1.3)
MONOCYTES NFR BLD AUTO: 9.8 % (ref 4–12)
NEUTROPHILS # BLD AUTO: 2.06 10*3/MM3 (ref 1.87–8.4)
NEUTROPHILS NFR BLD AUTO: 51.7 % (ref 39–78)
PLATELET # BLD AUTO: 242 10*3/MM3 (ref 130–400)
PMV BLD AUTO: 9.7 FL (ref 6–12)
POTASSIUM BLD-SCNC: 4.4 MMOL/L (ref 3.5–5.3)
PROT SERPL-MCNC: 6.3 G/DL (ref 6.3–8.7)
RBC # BLD AUTO: 3.54 10*6/MM3 (ref 4.2–5.4)
SODIUM BLD-SCNC: 138 MMOL/L (ref 135–145)
WBC NRBC COR # BLD: 3.98 10*3/MM3 (ref 4.8–10.8)

## 2017-02-27 PROCEDURE — 80053 COMPREHEN METABOLIC PANEL: CPT | Performed by: INTERNAL MEDICINE

## 2017-02-27 PROCEDURE — 85025 COMPLETE CBC W/AUTO DIFF WBC: CPT | Performed by: INTERNAL MEDICINE

## 2017-04-11 ENCOUNTER — APPOINTMENT (OUTPATIENT)
Dept: CT IMAGING | Facility: HOSPITAL | Age: 72
End: 2017-04-11
Attending: INTERNAL MEDICINE

## 2017-05-16 ENCOUNTER — HOSPITAL ENCOUNTER (OUTPATIENT)
Dept: CT IMAGING | Facility: HOSPITAL | Age: 72
Discharge: HOME | End: 2017-05-16
Attending: INTERNAL MEDICINE | Admitting: INTERNAL MEDICINE

## 2017-05-16 DIAGNOSIS — Z85.72 PERSONAL HISTORY OF MALIGNANT LYMPHOMA: ICD-10-CM

## 2017-05-16 LAB — CREAT BLDA-MCNC: 0.8 MG/DL (ref 0.6–1.3)

## 2017-05-16 PROCEDURE — 74177 CT ABD & PELVIS W/CONTRAST: CPT

## 2017-05-16 PROCEDURE — 71260 CT THORAX DX C+: CPT

## 2017-05-16 PROCEDURE — 0 IOPAMIDOL 61 % SOLUTION: Performed by: INTERNAL MEDICINE

## 2017-05-16 PROCEDURE — 82565 ASSAY OF CREATININE: CPT

## 2017-05-16 RX ADMIN — IOPAMIDOL 100 ML: 612 INJECTION, SOLUTION INTRAVENOUS at 10:00

## 2017-06-08 RX ORDER — ZOLPIDEM TARTRATE 10 MG/1
10 TABLET ORAL NIGHTLY PRN
COMMUNITY
End: 2017-06-08 | Stop reason: SDUPTHER

## 2017-06-09 RX ORDER — ZOLPIDEM TARTRATE 10 MG/1
10 TABLET ORAL NIGHTLY PRN
Qty: 30 TABLET | Refills: 1 | Status: SHIPPED | OUTPATIENT
Start: 2017-06-09 | End: 2017-08-16 | Stop reason: SDUPTHER

## 2017-06-13 RX ORDER — CARVEDILOL 12.5 MG/1
TABLET ORAL
Qty: 90 TABLET | Refills: 3 | Status: SHIPPED | OUTPATIENT
Start: 2017-06-13 | End: 2018-04-24 | Stop reason: SDUPTHER

## 2017-06-26 ENCOUNTER — TELEPHONE (OUTPATIENT)
Dept: INTERNAL MEDICINE | Age: 72
End: 2017-06-26

## 2017-06-26 RX ORDER — OXYCODONE AND ACETAMINOPHEN 10; 325 MG/1; MG/1
1 TABLET ORAL EVERY 6 HOURS PRN
Qty: 168 TABLET | Refills: 0 | Status: SHIPPED | OUTPATIENT
Start: 2017-06-26 | End: 2017-07-03

## 2017-07-25 RX ORDER — OXYCODONE AND ACETAMINOPHEN 10; 325 MG/1; MG/1
1 TABLET ORAL EVERY 4 HOURS PRN
Qty: 168 TABLET | Refills: 0 | Status: SHIPPED | OUTPATIENT
Start: 2017-07-25 | End: 2017-08-01

## 2017-08-16 ENCOUNTER — OFFICE VISIT (OUTPATIENT)
Dept: INTERNAL MEDICINE | Age: 72
End: 2017-08-16
Payer: MEDICARE

## 2017-08-16 VITALS
RESPIRATION RATE: 18 BRPM | HEART RATE: 65 BPM | WEIGHT: 117 LBS | DIASTOLIC BLOOD PRESSURE: 62 MMHG | OXYGEN SATURATION: 98 % | HEIGHT: 63 IN | TEMPERATURE: 98 F | BODY MASS INDEX: 20.73 KG/M2 | SYSTOLIC BLOOD PRESSURE: 124 MMHG

## 2017-08-16 DIAGNOSIS — E78.2 MIXED HYPERLIPIDEMIA: ICD-10-CM

## 2017-08-16 DIAGNOSIS — E03.9 HYPOTHYROIDISM, UNSPECIFIED TYPE: ICD-10-CM

## 2017-08-16 DIAGNOSIS — I10 ESSENTIAL HYPERTENSION: ICD-10-CM

## 2017-08-16 DIAGNOSIS — E55.9 VITAMIN D DEFICIENCY: ICD-10-CM

## 2017-08-16 DIAGNOSIS — Z00.00 HEALTH CARE MAINTENANCE: ICD-10-CM

## 2017-08-16 DIAGNOSIS — Z12.31 ENCOUNTER FOR SCREENING MAMMOGRAM FOR BREAST CANCER: Primary | ICD-10-CM

## 2017-08-16 PROBLEM — I25.5 ISCHEMIC CARDIOMYOPATHY: Status: ACTIVE | Noted: 2017-08-16

## 2017-08-16 PROBLEM — F32.9 REACTIVE DEPRESSION: Status: ACTIVE | Noted: 2017-08-16

## 2017-08-16 PROBLEM — R91.8 LUNG MASS: Status: ACTIVE | Noted: 2017-08-16

## 2017-08-16 PROBLEM — C85.10 B-CELL LYMPHOMA (HCC): Status: ACTIVE | Noted: 2017-08-16

## 2017-08-16 PROCEDURE — 1036F TOBACCO NON-USER: CPT | Performed by: NURSE PRACTITIONER

## 2017-08-16 PROCEDURE — 99214 OFFICE O/P EST MOD 30 MIN: CPT | Performed by: NURSE PRACTITIONER

## 2017-08-16 PROCEDURE — 1090F PRES/ABSN URINE INCON ASSESS: CPT | Performed by: NURSE PRACTITIONER

## 2017-08-16 PROCEDURE — 3014F SCREEN MAMMO DOC REV: CPT | Performed by: NURSE PRACTITIONER

## 2017-08-16 PROCEDURE — G8420 CALC BMI NORM PARAMETERS: HCPCS | Performed by: NURSE PRACTITIONER

## 2017-08-16 PROCEDURE — 1123F ACP DISCUSS/DSCN MKR DOCD: CPT | Performed by: NURSE PRACTITIONER

## 2017-08-16 PROCEDURE — 4040F PNEUMOC VAC/ADMIN/RCVD: CPT | Performed by: NURSE PRACTITIONER

## 2017-08-16 PROCEDURE — G8427 DOCREV CUR MEDS BY ELIG CLIN: HCPCS | Performed by: NURSE PRACTITIONER

## 2017-08-16 PROCEDURE — G8399 PT W/DXA RESULTS DOCUMENT: HCPCS | Performed by: NURSE PRACTITIONER

## 2017-08-16 PROCEDURE — 3017F COLORECTAL CA SCREEN DOC REV: CPT | Performed by: NURSE PRACTITIONER

## 2017-08-16 RX ORDER — OXYCODONE AND ACETAMINOPHEN 10; 325 MG/1; MG/1
1 TABLET ORAL EVERY 4 HOURS PRN
COMMUNITY
End: 2017-09-15 | Stop reason: SDUPTHER

## 2017-08-16 RX ORDER — ESCITALOPRAM OXALATE 10 MG/1
TABLET ORAL
COMMUNITY
Start: 2017-07-28 | End: 2017-10-12 | Stop reason: SDUPTHER

## 2017-08-16 RX ORDER — LISINOPRIL 5 MG/1
TABLET ORAL
COMMUNITY
Start: 2017-06-22 | End: 2018-02-27 | Stop reason: SDUPTHER

## 2017-08-16 RX ORDER — ZOLPIDEM TARTRATE 10 MG/1
10 TABLET ORAL NIGHTLY PRN
Qty: 30 TABLET | Refills: 0 | Status: SHIPPED | OUTPATIENT
Start: 2017-08-16 | End: 2017-12-01 | Stop reason: SDUPTHER

## 2017-08-16 RX ORDER — NABUMETONE 500 MG/1
TABLET, FILM COATED ORAL
COMMUNITY
Start: 2017-08-08 | End: 2017-12-19

## 2017-08-16 RX ORDER — SPIRONOLACTONE 25 MG/1
TABLET ORAL
COMMUNITY
Start: 2017-05-15 | End: 2017-08-30 | Stop reason: SDUPTHER

## 2017-08-16 ASSESSMENT — ENCOUNTER SYMPTOMS
TROUBLE SWALLOWING: 0
SHORTNESS OF BREATH: 0
COLOR CHANGE: 0
ABDOMINAL PAIN: 0
VOMITING: 0
BLOOD IN STOOL: 0
STRIDOR: 0
DIARRHEA: 0
EYE DISCHARGE: 0
SORE THROAT: 0
ABDOMINAL DISTENTION: 0
CHOKING: 0
CONSTIPATION: 0
COUGH: 0
NAUSEA: 0
WHEEZING: 0
EYE ITCHING: 0

## 2017-08-16 ASSESSMENT — PATIENT HEALTH QUESTIONNAIRE - PHQ9
1. LITTLE INTEREST OR PLEASURE IN DOING THINGS: 0
SUM OF ALL RESPONSES TO PHQ9 QUESTIONS 1 & 2: 0
SUM OF ALL RESPONSES TO PHQ QUESTIONS 1-9: 0
2. FEELING DOWN, DEPRESSED OR HOPELESS: 0

## 2017-08-22 RX ORDER — OXYCODONE AND ACETAMINOPHEN 10; 325 MG/1; MG/1
1 TABLET ORAL EVERY 4 HOURS PRN
Qty: 168 TABLET | Refills: 0 | Status: SHIPPED | OUTPATIENT
Start: 2017-08-22 | End: 2017-09-15 | Stop reason: SDUPTHER

## 2017-09-12 ENCOUNTER — TELEPHONE (OUTPATIENT)
Dept: INTERNAL MEDICINE | Age: 72
End: 2017-09-12

## 2017-09-12 RX ORDER — OXYCODONE AND ACETAMINOPHEN 10; 325 MG/1; MG/1
1 TABLET ORAL EVERY 4 HOURS PRN
Qty: 168 TABLET | Refills: 0 | Status: CANCELLED | OUTPATIENT
Start: 2017-09-12 | End: 2017-10-10

## 2017-09-12 NOTE — TELEPHONE ENCOUNTER
Pt was in the office needing a refill on her OXYCODONE-ACETAMINOPHEN  MG PO TABS. She is aware of the 72 hour wait.

## 2017-09-15 DIAGNOSIS — C85.10 B-CELL LYMPHOMA, UNSPECIFIED B-CELL LYMPHOMA TYPE, UNSPECIFIED BODY REGION (HCC): Primary | ICD-10-CM

## 2017-09-15 RX ORDER — OXYCODONE AND ACETAMINOPHEN 10; 325 MG/1; MG/1
1 TABLET ORAL EVERY 4 HOURS PRN
Qty: 168 TABLET | Refills: 0 | Status: SHIPPED | OUTPATIENT
Start: 2017-09-15 | End: 2017-11-07 | Stop reason: SDUPTHER

## 2017-09-15 RX ORDER — OXYCODONE AND ACETAMINOPHEN 10; 325 MG/1; MG/1
1 TABLET ORAL EVERY 4 HOURS PRN
Qty: 168 TABLET | Refills: 0 | Status: SHIPPED | OUTPATIENT
Start: 2017-09-15 | End: 2017-10-13

## 2017-10-09 RX ORDER — OXYCODONE AND ACETAMINOPHEN 10; 325 MG/1; MG/1
1 TABLET ORAL EVERY 4 HOURS PRN
Qty: 168 TABLET | Refills: 0 | Status: SHIPPED | OUTPATIENT
Start: 2017-10-09 | End: 2017-10-16

## 2017-10-12 ENCOUNTER — TELEPHONE (OUTPATIENT)
Dept: INTERNAL MEDICINE | Age: 72
End: 2017-10-12

## 2017-10-12 RX ORDER — ESCITALOPRAM OXALATE 10 MG/1
10 TABLET ORAL DAILY
Qty: 90 TABLET | Refills: 1 | Status: SHIPPED | OUTPATIENT
Start: 2017-10-12 | End: 2018-05-19 | Stop reason: SDUPTHER

## 2017-10-12 NOTE — TELEPHONE ENCOUNTER
Patient in office today and she needs a refill called into [address] [place] [address].   Please call when ready

## 2017-11-03 ENCOUNTER — TELEPHONE (OUTPATIENT)
Dept: INTERNAL MEDICINE | Age: 72
End: 2017-11-03

## 2017-11-03 NOTE — TELEPHONE ENCOUNTER
Pt is needing a refill on her oxyCODONE-acetaminophen (PERCOCET)  MG per tablet. She is aware of the 72 hour wait time.

## 2017-11-07 RX ORDER — OXYCODONE AND ACETAMINOPHEN 10; 325 MG/1; MG/1
1 TABLET ORAL EVERY 4 HOURS PRN
Qty: 168 TABLET | Refills: 0 | Status: SHIPPED | OUTPATIENT
Start: 2017-11-07 | End: 2017-11-29 | Stop reason: SDUPTHER

## 2017-11-28 ENCOUNTER — TELEPHONE (OUTPATIENT)
Dept: INTERNAL MEDICINE | Age: 72
End: 2017-11-28

## 2017-11-29 RX ORDER — OXYCODONE AND ACETAMINOPHEN 10; 325 MG/1; MG/1
1 TABLET ORAL EVERY 4 HOURS PRN
Qty: 168 TABLET | Refills: 0 | Status: SHIPPED | OUTPATIENT
Start: 2017-11-29 | End: 2017-12-26 | Stop reason: SDUPTHER

## 2017-12-01 ENCOUNTER — TELEPHONE (OUTPATIENT)
Dept: INTERNAL MEDICINE | Age: 72
End: 2017-12-01

## 2017-12-01 DIAGNOSIS — F51.01 PRIMARY INSOMNIA: ICD-10-CM

## 2017-12-01 RX ORDER — ZOLPIDEM TARTRATE 10 MG/1
TABLET ORAL
Qty: 30 TABLET | Refills: 0 | Status: SHIPPED | OUTPATIENT
Start: 2017-12-01

## 2017-12-08 DIAGNOSIS — E03.9 HYPOTHYROIDISM, UNSPECIFIED TYPE: ICD-10-CM

## 2017-12-08 DIAGNOSIS — E78.2 MIXED HYPERLIPIDEMIA: ICD-10-CM

## 2017-12-08 DIAGNOSIS — Z00.00 HEALTH CARE MAINTENANCE: ICD-10-CM

## 2017-12-08 DIAGNOSIS — E55.9 VITAMIN D DEFICIENCY: ICD-10-CM

## 2017-12-08 LAB
ALBUMIN SERPL-MCNC: 4.4 G/DL (ref 3.5–5.2)
ALP BLD-CCNC: 96 U/L (ref 35–104)
ALT SERPL-CCNC: 12 U/L (ref 5–33)
ANION GAP SERPL CALCULATED.3IONS-SCNC: 13 MMOL/L (ref 7–19)
AST SERPL-CCNC: 19 U/L (ref 5–32)
BASOPHILS ABSOLUTE: 0 K/UL (ref 0–0.2)
BASOPHILS RELATIVE PERCENT: 0.4 % (ref 0–1)
BILIRUB SERPL-MCNC: 0.3 MG/DL (ref 0.2–1.2)
BUN BLDV-MCNC: 13 MG/DL (ref 8–23)
CALCIUM SERPL-MCNC: 9.5 MG/DL (ref 8.8–10.2)
CHLORIDE BLD-SCNC: 105 MMOL/L (ref 98–111)
CHOLESTEROL, TOTAL: 190 MG/DL (ref 160–199)
CO2: 26 MMOL/L (ref 22–29)
CREAT SERPL-MCNC: 0.6 MG/DL (ref 0.5–0.9)
EOSINOPHILS ABSOLUTE: 0.1 K/UL (ref 0–0.6)
EOSINOPHILS RELATIVE PERCENT: 1.5 % (ref 0–5)
GFR NON-AFRICAN AMERICAN: >60
GLUCOSE BLD-MCNC: 91 MG/DL (ref 74–109)
HCT VFR BLD CALC: 34.9 % (ref 37–47)
HDLC SERPL-MCNC: 50 MG/DL (ref 65–121)
HEMOGLOBIN: 11.2 G/DL (ref 12–16)
LDL CHOLESTEROL CALCULATED: 116 MG/DL
LYMPHOCYTES ABSOLUTE: 1.4 K/UL (ref 1.1–4.5)
LYMPHOCYTES RELATIVE PERCENT: 24.6 % (ref 20–40)
MCH RBC QN AUTO: 32.5 PG (ref 27–31)
MCHC RBC AUTO-ENTMCNC: 32.1 G/DL (ref 33–37)
MCV RBC AUTO: 101.2 FL (ref 81–99)
MONOCYTES ABSOLUTE: 0.4 K/UL (ref 0–0.9)
MONOCYTES RELATIVE PERCENT: 7.7 % (ref 0–10)
NEUTROPHILS ABSOLUTE: 3.6 K/UL (ref 1.5–7.5)
NEUTROPHILS RELATIVE PERCENT: 65.3 % (ref 50–65)
PDW BLD-RTO: 11.9 % (ref 11.5–14.5)
PLATELET # BLD: 283 K/UL (ref 130–400)
PMV BLD AUTO: 9.3 FL (ref 9.4–12.3)
POTASSIUM SERPL-SCNC: 4.8 MMOL/L (ref 3.5–5)
RBC # BLD: 3.45 M/UL (ref 4.2–5.4)
SODIUM BLD-SCNC: 144 MMOL/L (ref 136–145)
TOTAL PROTEIN: 7.3 G/DL (ref 6.6–8.7)
TRIGL SERPL-MCNC: 119 MG/DL (ref 0–149)
TSH SERPL DL<=0.05 MIU/L-ACNC: 0.23 UIU/ML (ref 0.27–4.2)
VITAMIN D 25-HYDROXY: 20.9 NG/ML
WBC # BLD: 5.5 K/UL (ref 4.8–10.8)

## 2017-12-12 RX ORDER — LANOLIN ALCOHOL/MO/W.PET/CERES
50 CREAM (GRAM) TOPICAL DAILY
COMMUNITY

## 2017-12-19 ENCOUNTER — OFFICE VISIT (OUTPATIENT)
Dept: INTERNAL MEDICINE | Age: 72
End: 2017-12-19
Payer: MEDICARE

## 2017-12-19 VITALS
HEART RATE: 83 BPM | HEIGHT: 62 IN | WEIGHT: 119 LBS | BODY MASS INDEX: 21.9 KG/M2 | OXYGEN SATURATION: 99 % | DIASTOLIC BLOOD PRESSURE: 80 MMHG | SYSTOLIC BLOOD PRESSURE: 138 MMHG

## 2017-12-19 DIAGNOSIS — Z23 NEED FOR PROPHYLACTIC VACCINATION AGAINST STREPTOCOCCUS PNEUMONIAE (PNEUMOCOCCUS): ICD-10-CM

## 2017-12-19 DIAGNOSIS — Z23 NEED FOR INFLUENZA VACCINATION: Primary | ICD-10-CM

## 2017-12-19 DIAGNOSIS — I42.8 OTHER CARDIOMYOPATHY (HCC): ICD-10-CM

## 2017-12-19 DIAGNOSIS — F32.9 REACTIVE DEPRESSION: ICD-10-CM

## 2017-12-19 DIAGNOSIS — C85.10 B-CELL LYMPHOMA, UNSPECIFIED B-CELL LYMPHOMA TYPE, UNSPECIFIED BODY REGION (HCC): ICD-10-CM

## 2017-12-19 DIAGNOSIS — G89.3 CHRONIC PAIN DUE TO NEOPLASM: ICD-10-CM

## 2017-12-19 DIAGNOSIS — E55.9 VITAMIN D DEFICIENCY: ICD-10-CM

## 2017-12-19 PROCEDURE — G0009 ADMIN PNEUMOCOCCAL VACCINE: HCPCS | Performed by: INTERNAL MEDICINE

## 2017-12-19 PROCEDURE — 90670 PCV13 VACCINE IM: CPT | Performed by: INTERNAL MEDICINE

## 2017-12-19 PROCEDURE — 1090F PRES/ABSN URINE INCON ASSESS: CPT | Performed by: INTERNAL MEDICINE

## 2017-12-19 PROCEDURE — 4040F PNEUMOC VAC/ADMIN/RCVD: CPT | Performed by: INTERNAL MEDICINE

## 2017-12-19 PROCEDURE — G8598 ASA/ANTIPLAT THER USED: HCPCS | Performed by: INTERNAL MEDICINE

## 2017-12-19 PROCEDURE — G0008 ADMIN INFLUENZA VIRUS VAC: HCPCS | Performed by: INTERNAL MEDICINE

## 2017-12-19 PROCEDURE — 3017F COLORECTAL CA SCREEN DOC REV: CPT | Performed by: INTERNAL MEDICINE

## 2017-12-19 PROCEDURE — G8427 DOCREV CUR MEDS BY ELIG CLIN: HCPCS | Performed by: INTERNAL MEDICINE

## 2017-12-19 PROCEDURE — G8420 CALC BMI NORM PARAMETERS: HCPCS | Performed by: INTERNAL MEDICINE

## 2017-12-19 PROCEDURE — 1036F TOBACCO NON-USER: CPT | Performed by: INTERNAL MEDICINE

## 2017-12-19 PROCEDURE — G8399 PT W/DXA RESULTS DOCUMENT: HCPCS | Performed by: INTERNAL MEDICINE

## 2017-12-19 PROCEDURE — 90662 IIV NO PRSV INCREASED AG IM: CPT | Performed by: INTERNAL MEDICINE

## 2017-12-19 PROCEDURE — 3014F SCREEN MAMMO DOC REV: CPT | Performed by: INTERNAL MEDICINE

## 2017-12-19 PROCEDURE — G8484 FLU IMMUNIZE NO ADMIN: HCPCS | Performed by: INTERNAL MEDICINE

## 2017-12-19 PROCEDURE — 1123F ACP DISCUSS/DSCN MKR DOCD: CPT | Performed by: INTERNAL MEDICINE

## 2017-12-19 PROCEDURE — 99214 OFFICE O/P EST MOD 30 MIN: CPT | Performed by: INTERNAL MEDICINE

## 2017-12-19 RX ORDER — ACETAMINOPHEN 160 MG
TABLET,DISINTEGRATING ORAL
COMMUNITY

## 2017-12-19 ASSESSMENT — ENCOUNTER SYMPTOMS
RHINORRHEA: 0
COUGH: 0
SORE THROAT: 0
SHORTNESS OF BREATH: 0
NAUSEA: 0
TROUBLE SWALLOWING: 0
ABDOMINAL PAIN: 0
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
## 2017-12-22 NOTE — TELEPHONE ENCOUNTER
Pt is needing a refill on her oxyCODONE-acetaminophen (PERCOCET)  MG per tablet. She is aware of 72 hour wait.

## 2017-12-26 RX ORDER — OXYCODONE AND ACETAMINOPHEN 10; 325 MG/1; MG/1
1 TABLET ORAL EVERY 4 HOURS PRN
Qty: 168 TABLET | Refills: 0 | Status: SHIPPED | OUTPATIENT
Start: 2017-12-26 | End: 2018-01-18 | Stop reason: SDUPTHER

## 2018-01-17 RX ORDER — LISINOPRIL 5 MG/1
TABLET ORAL
Qty: 90 TABLET | Refills: 0 | OUTPATIENT
Start: 2018-01-17

## 2018-01-18 ENCOUNTER — TELEPHONE (OUTPATIENT)
Dept: INTERNAL MEDICINE | Age: 73
End: 2018-01-18

## 2018-01-18 ENCOUNTER — HOSPITAL ENCOUNTER (OUTPATIENT)
Dept: PREADMISSION TESTING | Age: 73
Setting detail: OUTPATIENT SURGERY
Discharge: HOME | End: 2018-01-18

## 2018-01-18 ENCOUNTER — HOSPITAL ENCOUNTER (OUTPATIENT)
Dept: NON INVASIVE DIAGNOSTICS | Age: 73
Discharge: HOME | End: 2018-01-18
Payer: MEDICARE

## 2018-01-18 DIAGNOSIS — G89.3 CHRONIC PAIN DUE TO NEOPLASM: Primary | ICD-10-CM

## 2018-01-18 PROCEDURE — 93005 ELECTROCARDIOGRAM TRACING: CPT

## 2018-01-18 RX ORDER — OXYCODONE AND ACETAMINOPHEN 10; 325 MG/1; MG/1
1 TABLET ORAL EVERY 4 HOURS PRN
Qty: 168 TABLET | Refills: 0 | Status: SHIPPED | OUTPATIENT
Start: 2018-01-18 | End: 2018-02-20 | Stop reason: SDUPTHER

## 2018-01-23 ENCOUNTER — ANESTHESIA EVENT (OUTPATIENT)
Dept: OPERATING ROOM | Age: 73
End: 2018-01-23

## 2018-01-26 ENCOUNTER — ANESTHESIA (OUTPATIENT)
Dept: OPERATING ROOM | Age: 73
End: 2018-01-26

## 2018-01-26 ENCOUNTER — HOSPITAL ENCOUNTER (OUTPATIENT)
Age: 73
Setting detail: OUTPATIENT SURGERY
Discharge: HOME | End: 2018-01-26
Attending: ORTHOPAEDIC SURGERY | Admitting: ORTHOPAEDIC SURGERY

## 2018-01-26 ENCOUNTER — HOSPITAL ENCOUNTER (OUTPATIENT)
Dept: GENERAL RADIOLOGY | Age: 73
Discharge: HOME | End: 2018-01-26
Payer: MEDICARE

## 2018-01-26 VITALS
BODY MASS INDEX: 21.35 KG/M2 | RESPIRATION RATE: 20 BRPM | OXYGEN SATURATION: 95 % | SYSTOLIC BLOOD PRESSURE: 136 MMHG | HEART RATE: 94 BPM | DIASTOLIC BLOOD PRESSURE: 74 MMHG | WEIGHT: 116 LBS | HEIGHT: 62 IN

## 2018-01-26 VITALS
SYSTOLIC BLOOD PRESSURE: 114 MMHG | OXYGEN SATURATION: 96 % | DIASTOLIC BLOOD PRESSURE: 59 MMHG | RESPIRATION RATE: 2 BRPM

## 2018-01-26 DIAGNOSIS — M89.8X5 PAIN OF LEFT FEMUR: ICD-10-CM

## 2018-01-26 PROCEDURE — 28820 AMPUTATION OF TOE: CPT

## 2018-01-26 PROCEDURE — G8907 PT DOC NO EVENTS ON DISCHARG: HCPCS | Performed by: NURSE PRACTITIONER

## 2018-01-26 PROCEDURE — 20680 REMOVAL OF IMPLANT DEEP: CPT

## 2018-01-26 PROCEDURE — G8918 PT W/O PREOP ORDER IV AB PRO: HCPCS | Performed by: NURSE PRACTITIONER

## 2018-01-26 PROCEDURE — 3209999900 FLUORO FOR SURGICAL PROCEDURES

## 2018-01-26 RX ORDER — ONDANSETRON 2 MG/ML
4 INJECTION INTRAMUSCULAR; INTRAVENOUS
Status: COMPLETED | OUTPATIENT
Start: 2018-01-26 | End: 2018-01-26

## 2018-01-26 RX ORDER — MEPERIDINE HYDROCHLORIDE 25 MG/ML
12.5 INJECTION INTRAMUSCULAR; INTRAVENOUS; SUBCUTANEOUS EVERY 5 MIN PRN
Status: DISCONTINUED | OUTPATIENT
Start: 2018-01-26 | End: 2018-01-26 | Stop reason: HOSPADM

## 2018-01-26 RX ORDER — ONDANSETRON 4 MG/1
8 TABLET, ORALLY DISINTEGRATING ORAL
Status: CANCELLED | OUTPATIENT
Start: 2018-01-26 | End: 2018-01-26

## 2018-01-26 RX ORDER — FENTANYL CITRATE 50 UG/ML
50 INJECTION, SOLUTION INTRAMUSCULAR; INTRAVENOUS EVERY 5 MIN PRN
Status: DISCONTINUED | OUTPATIENT
Start: 2018-01-26 | End: 2018-01-26 | Stop reason: HOSPADM

## 2018-01-26 RX ORDER — LABETALOL HYDROCHLORIDE 5 MG/ML
5 INJECTION, SOLUTION INTRAVENOUS EVERY 10 MIN PRN
Status: DISCONTINUED | OUTPATIENT
Start: 2018-01-26 | End: 2018-01-26 | Stop reason: HOSPADM

## 2018-01-26 RX ORDER — OXYCODONE HYDROCHLORIDE AND ACETAMINOPHEN 5; 325 MG/1; MG/1
1 TABLET ORAL PRN
Status: COMPLETED | OUTPATIENT
Start: 2018-01-26 | End: 2018-01-26

## 2018-01-26 RX ORDER — MIDAZOLAM HYDROCHLORIDE 1 MG/ML
INJECTION INTRAMUSCULAR; INTRAVENOUS PRN
Status: DISCONTINUED | OUTPATIENT
Start: 2018-01-26 | End: 2018-01-26 | Stop reason: SDUPTHER

## 2018-01-26 RX ORDER — HYDROMORPHONE HCL 110MG/55ML
0.5 PATIENT CONTROLLED ANALGESIA SYRINGE INTRAVENOUS EVERY 5 MIN PRN
Status: DISCONTINUED | OUTPATIENT
Start: 2018-01-26 | End: 2018-01-26 | Stop reason: HOSPADM

## 2018-01-26 RX ORDER — OXYCODONE HYDROCHLORIDE AND ACETAMINOPHEN 5; 325 MG/1; MG/1
2 TABLET ORAL PRN
Status: COMPLETED | OUTPATIENT
Start: 2018-01-26 | End: 2018-01-26

## 2018-01-26 RX ORDER — HYDROMORPHONE HCL 110MG/55ML
0.25 PATIENT CONTROLLED ANALGESIA SYRINGE INTRAVENOUS EVERY 5 MIN PRN
Status: DISCONTINUED | OUTPATIENT
Start: 2018-01-26 | End: 2018-01-26 | Stop reason: HOSPADM

## 2018-01-26 RX ORDER — FENTANYL CITRATE 50 UG/ML
INJECTION, SOLUTION INTRAMUSCULAR; INTRAVENOUS PRN
Status: DISCONTINUED | OUTPATIENT
Start: 2018-01-26 | End: 2018-01-26 | Stop reason: SDUPTHER

## 2018-01-26 RX ORDER — HYDROCODONE BITARTRATE AND ACETAMINOPHEN 7.5; 325 MG/1; MG/1
1 TABLET ORAL EVERY 6 HOURS PRN
Qty: 40 TABLET | Refills: 0 | Status: SHIPPED | OUTPATIENT
Start: 2018-01-26 | End: 2018-02-05

## 2018-01-26 RX ORDER — ONDANSETRON 2 MG/ML
INJECTION INTRAMUSCULAR; INTRAVENOUS PRN
Status: DISCONTINUED | OUTPATIENT
Start: 2018-01-26 | End: 2018-01-26 | Stop reason: SDUPTHER

## 2018-01-26 RX ORDER — LIDOCAINE HYDROCHLORIDE 10 MG/ML
1 INJECTION, SOLUTION EPIDURAL; INFILTRATION; INTRACAUDAL; PERINEURAL
Status: DISCONTINUED | OUTPATIENT
Start: 2018-01-26 | End: 2018-01-26 | Stop reason: HOSPADM

## 2018-01-26 RX ORDER — DIPHENHYDRAMINE HYDROCHLORIDE 50 MG/ML
12.5 INJECTION INTRAMUSCULAR; INTRAVENOUS
Status: DISCONTINUED | OUTPATIENT
Start: 2018-01-26 | End: 2018-01-26 | Stop reason: HOSPADM

## 2018-01-26 RX ORDER — SODIUM CHLORIDE, SODIUM LACTATE, POTASSIUM CHLORIDE, CALCIUM CHLORIDE 600; 310; 30; 20 MG/100ML; MG/100ML; MG/100ML; MG/100ML
INJECTION, SOLUTION INTRAVENOUS CONTINUOUS
Status: DISCONTINUED | OUTPATIENT
Start: 2018-01-26 | End: 2018-01-26 | Stop reason: HOSPADM

## 2018-01-26 RX ORDER — PROPOFOL 10 MG/ML
INJECTION, EMULSION INTRAVENOUS PRN
Status: DISCONTINUED | OUTPATIENT
Start: 2018-01-26 | End: 2018-01-26 | Stop reason: SDUPTHER

## 2018-01-26 RX ORDER — HYDRALAZINE HYDROCHLORIDE 20 MG/ML
5 INJECTION INTRAMUSCULAR; INTRAVENOUS EVERY 10 MIN PRN
Status: DISCONTINUED | OUTPATIENT
Start: 2018-01-26 | End: 2018-01-26 | Stop reason: HOSPADM

## 2018-01-26 RX ADMIN — PROPOFOL 100 MG: 10 INJECTION, EMULSION INTRAVENOUS at 08:20

## 2018-01-26 RX ADMIN — SODIUM CHLORIDE, SODIUM LACTATE, POTASSIUM CHLORIDE, CALCIUM CHLORIDE: 600; 310; 30; 20 INJECTION, SOLUTION INTRAVENOUS at 07:05

## 2018-01-26 RX ADMIN — FENTANYL CITRATE 50 MCG: 50 INJECTION, SOLUTION INTRAMUSCULAR; INTRAVENOUS at 08:16

## 2018-01-26 RX ADMIN — ONDANSETRON 4 MG: 2 INJECTION INTRAMUSCULAR; INTRAVENOUS at 09:40

## 2018-01-26 RX ADMIN — FENTANYL CITRATE 50 MCG: 50 INJECTION, SOLUTION INTRAMUSCULAR; INTRAVENOUS at 08:40

## 2018-01-26 RX ADMIN — PROPOFOL 70 MG: 10 INJECTION, EMULSION INTRAVENOUS at 08:19

## 2018-01-26 RX ADMIN — FENTANYL CITRATE 50 MCG: 50 INJECTION, SOLUTION INTRAMUSCULAR; INTRAVENOUS at 08:36

## 2018-01-26 RX ADMIN — MIDAZOLAM HYDROCHLORIDE 1 MG: 1 INJECTION INTRAMUSCULAR; INTRAVENOUS at 08:16

## 2018-01-26 RX ADMIN — ONDANSETRON 2 MG: 2 INJECTION INTRAMUSCULAR; INTRAVENOUS at 08:40

## 2018-01-26 RX ADMIN — OXYCODONE HYDROCHLORIDE AND ACETAMINOPHEN 2 TABLET: 5; 325 TABLET ORAL at 09:44

## 2018-01-26 RX ADMIN — ONDANSETRON 2 MG: 2 INJECTION INTRAMUSCULAR; INTRAVENOUS at 08:39

## 2018-01-26 ASSESSMENT — PAIN SCALES - GENERAL: PAINLEVEL_OUTOF10: 5

## 2018-01-26 NOTE — ANESTHESIA POSTPROCEDURE EVALUATION
Department of Anesthesiology  Postprocedure Note    Patient: David Coffey  MRN: [id number]  YOB: 1945  Date of evaluation: 1/26/2018  Time:  9:10 AM     Procedure Summary     Date:  01/26/18 Room / Location:  [facility] OR  / [facility] OR    Anesthesia Start:  0816 Anesthesia Stop:  9847    Procedures: FEMUR HARDWARE REMOVAL (Left Leg Upper)      2ND TOE AMPUTATION (Left Foot) Diagnosis:  (PAINFUL RETAINED HARDWARE DISLOCATED 2ND TOE)    Surgeon:  Keli Rodriguez MD Responsible Provider: Viji Aguiar CRNA    Anesthesia Type:  general ASA Status:  2          Anesthesia Type: general    Alejandro Phase I:      Alejandro Phase II: Alejandro Score: 9    Last vitals: Reviewed and per EMR flowsheets.        Anesthesia Post Evaluation    Patient location during evaluation: bedside  Patient participation: complete - patient participated  Level of consciousness: sleepy but conscious and responsive to verbal stimuli  Airway patency: patent  Nausea & Vomiting: no nausea  Complications: no  Cardiovascular status: blood pressure returned to baseline  Respiratory status: spontaneous ventilation, room air and acceptable  Hydration status: euvolemic

## 2018-01-26 NOTE — PROCEDURES
[address] [facility], [address] [place] [address]. Renard Magallon, Luis Arm[facility]olsest[facility]                                [phone]                                  PROCEDURE NOTE    PATIENT NAME: Anders Waters                     :             1945  MED REC NO:   [id number]                               Seema Kessler  ACCOUNT NO:   [de-identified]                            ADMISSION DATE:  2018  PROVIDER:     Tracy Cm MD    DATE OF PROCEDURE:  2018    PREOPERATIVE DIAGNOSES:  1. Post intramedullary locking nailing, left femur fracture. 2.  Overlapping second left toe. POSTOPERATIVE DIAGNOSES:  1. Post intramedullary locking nailing, left femur fracture. 2.  Overlapping second left toe. PROCEDURES:  1. Removal of distal locking screws, left femoral nail. 2.  Amputation left second toe at the MP joint level. INDICATION AND FINDINGS:  This patient is post nailing of the left femur  fracture. She is markedly osteopenic. It was felt that it would be  advantageous to remove the distal locking screws and dynamize the nail so  that her femur would be load bearing. Additionally, she has developed an  overlapping second toe, it causes her painful shoe wear and it is felt that  the most appropriate treatment was an amputation. The patient understands  the risks and benefits of the surgery and asked me to proceed. OPERATIVE PROCEDURE:  After an adequate level of general anesthesia, the  patient's left lower extremity was prepped and draped in the usual fashion. The extremity was then exsanguinated with an Esmarch bandage and the  tourniquet was inflated to 300 mmHg. Under fluoroscopic control, two stab  wounds were then made over the distal locking screw heads. They were then  removed with a screwdriver without difficulty.   The wounds were then  irrigated and the skin was closed with

## 2018-02-15 ENCOUNTER — TELEPHONE (OUTPATIENT)
Dept: INTERNAL MEDICINE | Age: 73
End: 2018-02-15

## 2018-02-20 DIAGNOSIS — G89.3 CHRONIC PAIN DUE TO NEOPLASM: ICD-10-CM

## 2018-02-20 RX ORDER — ZOLPIDEM TARTRATE 10 MG/1
TABLET ORAL
Qty: 30 TABLET | Refills: 1 | Status: SHIPPED | OUTPATIENT
Start: 2018-02-20 | End: 2018-02-20

## 2018-02-20 RX ORDER — OXYCODONE AND ACETAMINOPHEN 10; 325 MG/1; MG/1
1 TABLET ORAL EVERY 4 HOURS PRN
Qty: 168 TABLET | Refills: 0 | Status: SHIPPED | OUTPATIENT
Start: 2018-02-20 | End: 2018-03-12 | Stop reason: SDUPTHER

## 2018-02-27 RX ORDER — LISINOPRIL 5 MG/1
5 TABLET ORAL DAILY
Qty: 30 TABLET | Refills: 5 | Status: SHIPPED | OUTPATIENT
Start: 2018-02-27 | End: 2018-08-20 | Stop reason: SDUPTHER

## 2018-03-12 ENCOUNTER — TELEPHONE (OUTPATIENT)
Dept: INTERNAL MEDICINE | Age: 73
End: 2018-03-12

## 2018-03-12 DIAGNOSIS — G89.3 CHRONIC PAIN DUE TO NEOPLASM: ICD-10-CM

## 2018-03-12 RX ORDER — OXYCODONE AND ACETAMINOPHEN 10; 325 MG/1; MG/1
1 TABLET ORAL EVERY 4 HOURS PRN
Qty: 168 TABLET | Refills: 0 | Status: SHIPPED | OUTPATIENT
Start: 2018-03-16 | End: 2018-04-09 | Stop reason: SDUPTHER

## 2018-04-09 DIAGNOSIS — G89.3 CHRONIC PAIN DUE TO NEOPLASM: ICD-10-CM

## 2018-04-09 RX ORDER — OXYCODONE AND ACETAMINOPHEN 10; 325 MG/1; MG/1
1 TABLET ORAL EVERY 4 HOURS PRN
Qty: 168 TABLET | Refills: 0 | Status: SHIPPED | OUTPATIENT
Start: 2018-04-12 | End: 2018-04-12 | Stop reason: SDUPTHER

## 2018-04-12 DIAGNOSIS — G89.3 CHRONIC PAIN DUE TO NEOPLASM: ICD-10-CM

## 2018-04-12 RX ORDER — OXYCODONE AND ACETAMINOPHEN 10; 325 MG/1; MG/1
1 TABLET ORAL EVERY 4 HOURS PRN
Qty: 168 TABLET | Refills: 0 | Status: SHIPPED | OUTPATIENT
Start: 2018-04-12 | End: 2018-05-02 | Stop reason: SDUPTHER

## 2018-04-18 DIAGNOSIS — Z23 NEED FOR INFLUENZA VACCINATION: ICD-10-CM

## 2018-04-18 DIAGNOSIS — C85.10 B-CELL LYMPHOMA, UNSPECIFIED B-CELL LYMPHOMA TYPE, UNSPECIFIED BODY REGION (HCC): ICD-10-CM

## 2018-04-18 DIAGNOSIS — I42.8 OTHER CARDIOMYOPATHY (HCC): ICD-10-CM

## 2018-04-18 DIAGNOSIS — G89.3 CHRONIC PAIN DUE TO NEOPLASM: ICD-10-CM

## 2018-04-18 DIAGNOSIS — F32.9 REACTIVE DEPRESSION: ICD-10-CM

## 2018-04-18 DIAGNOSIS — Z23 NEED FOR PROPHYLACTIC VACCINATION AGAINST STREPTOCOCCUS PNEUMONIAE (PNEUMOCOCCUS): ICD-10-CM

## 2018-04-18 DIAGNOSIS — E55.9 VITAMIN D DEFICIENCY: ICD-10-CM

## 2018-04-18 LAB
ALBUMIN SERPL-MCNC: 4.4 G/DL (ref 3.5–5.2)
ALP BLD-CCNC: 117 U/L (ref 35–104)
ALT SERPL-CCNC: 15 U/L (ref 5–33)
ANION GAP SERPL CALCULATED.3IONS-SCNC: 13 MMOL/L (ref 7–19)
AST SERPL-CCNC: 19 U/L (ref 5–32)
BILIRUB SERPL-MCNC: 0.3 MG/DL (ref 0.2–1.2)
BUN BLDV-MCNC: 21 MG/DL (ref 8–23)
CALCIUM SERPL-MCNC: 9.4 MG/DL (ref 8.8–10.2)
CHLORIDE BLD-SCNC: 103 MMOL/L (ref 98–111)
CO2: 26 MMOL/L (ref 22–29)
CREAT SERPL-MCNC: 0.6 MG/DL (ref 0.5–0.9)
GFR NON-AFRICAN AMERICAN: >60
GLUCOSE BLD-MCNC: 89 MG/DL (ref 74–109)
HCT VFR BLD CALC: 36.5 % (ref 37–47)
HEMOGLOBIN: 11.5 G/DL (ref 12–16)
MCH RBC QN AUTO: 31.3 PG (ref 27–31)
MCHC RBC AUTO-ENTMCNC: 31.5 G/DL (ref 33–37)
MCV RBC AUTO: 99.5 FL (ref 81–99)
PDW BLD-RTO: 12 % (ref 11.5–14.5)
PLATELET # BLD: 252 K/UL (ref 130–400)
PMV BLD AUTO: 9.2 FL (ref 9.4–12.3)
POTASSIUM SERPL-SCNC: 4.2 MMOL/L (ref 3.5–5)
RBC # BLD: 3.67 M/UL (ref 4.2–5.4)
SODIUM BLD-SCNC: 142 MMOL/L (ref 136–145)
T3 FREE: 3.1 PG/ML (ref 2–4.4)
T4 FREE: 0.9 NG/DL (ref 0.9–1.7)
TOTAL PROTEIN: 7.2 G/DL (ref 6.6–8.7)
TSH SERPL DL<=0.05 MIU/L-ACNC: 0.57 UIU/ML (ref 0.27–4.2)
VITAMIN D 25-HYDROXY: 43.1 NG/ML
WBC # BLD: 4.7 K/UL (ref 4.8–10.8)

## 2018-04-24 ENCOUNTER — OFFICE VISIT (OUTPATIENT)
Dept: INTERNAL MEDICINE | Age: 73
End: 2018-04-24
Payer: MEDICARE

## 2018-04-24 VITALS
HEIGHT: 62 IN | SYSTOLIC BLOOD PRESSURE: 128 MMHG | BODY MASS INDEX: 22.08 KG/M2 | WEIGHT: 120 LBS | OXYGEN SATURATION: 96 % | RESPIRATION RATE: 16 BRPM | DIASTOLIC BLOOD PRESSURE: 76 MMHG | HEART RATE: 76 BPM

## 2018-04-24 DIAGNOSIS — G89.3 CHRONIC PAIN DUE TO NEOPLASM: ICD-10-CM

## 2018-04-24 DIAGNOSIS — C85.10 B-CELL LYMPHOMA, UNSPECIFIED B-CELL LYMPHOMA TYPE, UNSPECIFIED BODY REGION (HCC): ICD-10-CM

## 2018-04-24 DIAGNOSIS — E78.5 HYPERLIPIDEMIA, UNSPECIFIED HYPERLIPIDEMIA TYPE: ICD-10-CM

## 2018-04-24 DIAGNOSIS — I25.5 ISCHEMIC CARDIOMYOPATHY: Primary | ICD-10-CM

## 2018-04-24 DIAGNOSIS — Z98.890 S/P FOOT SURGERY, LEFT: ICD-10-CM

## 2018-04-24 PROCEDURE — G8427 DOCREV CUR MEDS BY ELIG CLIN: HCPCS | Performed by: NURSE PRACTITIONER

## 2018-04-24 PROCEDURE — 1123F ACP DISCUSS/DSCN MKR DOCD: CPT | Performed by: NURSE PRACTITIONER

## 2018-04-24 PROCEDURE — G8598 ASA/ANTIPLAT THER USED: HCPCS | Performed by: NURSE PRACTITIONER

## 2018-04-24 PROCEDURE — 1036F TOBACCO NON-USER: CPT | Performed by: NURSE PRACTITIONER

## 2018-04-24 PROCEDURE — 99214 OFFICE O/P EST MOD 30 MIN: CPT | Performed by: NURSE PRACTITIONER

## 2018-04-24 PROCEDURE — 1090F PRES/ABSN URINE INCON ASSESS: CPT | Performed by: NURSE PRACTITIONER

## 2018-04-24 PROCEDURE — G8420 CALC BMI NORM PARAMETERS: HCPCS | Performed by: NURSE PRACTITIONER

## 2018-04-24 PROCEDURE — G8399 PT W/DXA RESULTS DOCUMENT: HCPCS | Performed by: NURSE PRACTITIONER

## 2018-04-24 PROCEDURE — 3017F COLORECTAL CA SCREEN DOC REV: CPT | Performed by: NURSE PRACTITIONER

## 2018-04-24 PROCEDURE — 4040F PNEUMOC VAC/ADMIN/RCVD: CPT | Performed by: NURSE PRACTITIONER

## 2018-04-24 RX ORDER — CARVEDILOL 12.5 MG/1
12.5 TABLET ORAL 2 TIMES DAILY
Qty: 90 TABLET | Refills: 3 | Status: SHIPPED | OUTPATIENT
Start: 2018-04-24

## 2018-04-24 ASSESSMENT — ENCOUNTER SYMPTOMS
CHOKING: 0
SHORTNESS OF BREATH: 0
EYE DISCHARGE: 0
DIARRHEA: 0
NAUSEA: 0
ABDOMINAL DISTENTION: 0
STRIDOR: 0
COUGH: 0
BLOOD IN STOOL: 0
CONSTIPATION: 0
ABDOMINAL PAIN: 0
COLOR CHANGE: 0
TROUBLE SWALLOWING: 0
SORE THROAT: 0
EYE ITCHING: 0
WHEEZING: 0
VOMITING: 0

## 2018-05-02 ENCOUNTER — TELEPHONE (OUTPATIENT)
Dept: PRIMARY CARE CLINIC | Age: 73
End: 2018-05-02

## 2018-05-02 DIAGNOSIS — G89.3 CHRONIC PAIN DUE TO NEOPLASM: ICD-10-CM

## 2018-05-03 DIAGNOSIS — G89.3 CHRONIC PAIN DUE TO NEOPLASM: ICD-10-CM

## 2018-05-03 RX ORDER — OXYCODONE AND ACETAMINOPHEN 10; 325 MG/1; MG/1
1 TABLET ORAL EVERY 4 HOURS PRN
Qty: 168 TABLET | Refills: 0 | Status: SHIPPED | OUTPATIENT
Start: 2018-05-07 | End: 2018-06-04 | Stop reason: SDUPTHER

## 2018-05-03 RX ORDER — OXYCODONE AND ACETAMINOPHEN 10; 325 MG/1; MG/1
1 TABLET ORAL EVERY 4 HOURS PRN
Qty: 168 TABLET | Refills: 0 | Status: SHIPPED | OUTPATIENT
Start: 2018-05-07 | End: 2018-05-03 | Stop reason: SDUPTHER

## 2018-05-21 RX ORDER — ESCITALOPRAM OXALATE 10 MG/1
TABLET ORAL
Qty: 90 TABLET | Refills: 0 | Status: SHIPPED | OUTPATIENT
Start: 2018-05-21

## 2018-05-31 DIAGNOSIS — G89.3 CHRONIC PAIN DUE TO NEOPLASM: ICD-10-CM

## 2018-05-31 RX ORDER — OXYCODONE AND ACETAMINOPHEN 10; 325 MG/1; MG/1
1 TABLET ORAL EVERY 4 HOURS PRN
Qty: 168 TABLET | Refills: 0 | Status: CANCELLED | OUTPATIENT
Start: 2018-05-31 | End: 2018-06-28

## 2018-06-04 DIAGNOSIS — G89.3 CHRONIC PAIN DUE TO NEOPLASM: ICD-10-CM

## 2018-06-04 RX ORDER — OXYCODONE AND ACETAMINOPHEN 10; 325 MG/1; MG/1
1 TABLET ORAL EVERY 6 HOURS PRN
Qty: 40 TABLET | Refills: 0 | Status: SHIPPED | OUTPATIENT
Start: 2018-06-04 | End: 2018-06-14 | Stop reason: SDUPTHER

## 2018-06-11 ENCOUNTER — OFFICE VISIT (OUTPATIENT)
Dept: INTERNAL MEDICINE | Age: 73
End: 2018-06-11
Payer: MEDICARE

## 2018-06-11 VITALS
HEART RATE: 97 BPM | OXYGEN SATURATION: 98 % | HEIGHT: 62 IN | WEIGHT: 122 LBS | SYSTOLIC BLOOD PRESSURE: 130 MMHG | BODY MASS INDEX: 22.45 KG/M2 | DIASTOLIC BLOOD PRESSURE: 86 MMHG

## 2018-06-11 DIAGNOSIS — G89.29 CHRONIC LOW BACK PAIN WITH LEFT-SIDED SCIATICA, UNSPECIFIED BACK PAIN LATERALITY: ICD-10-CM

## 2018-06-11 DIAGNOSIS — G89.4 CHRONIC PAIN SYNDROME: Primary | ICD-10-CM

## 2018-06-11 DIAGNOSIS — I42.8 OTHER CARDIOMYOPATHY (HCC): ICD-10-CM

## 2018-06-11 DIAGNOSIS — M54.42 CHRONIC LOW BACK PAIN WITH LEFT-SIDED SCIATICA, UNSPECIFIED BACK PAIN LATERALITY: ICD-10-CM

## 2018-06-11 PROCEDURE — G8427 DOCREV CUR MEDS BY ELIG CLIN: HCPCS | Performed by: NURSE PRACTITIONER

## 2018-06-11 PROCEDURE — G8399 PT W/DXA RESULTS DOCUMENT: HCPCS | Performed by: NURSE PRACTITIONER

## 2018-06-11 PROCEDURE — 1123F ACP DISCUSS/DSCN MKR DOCD: CPT | Performed by: NURSE PRACTITIONER

## 2018-06-11 PROCEDURE — 99214 OFFICE O/P EST MOD 30 MIN: CPT | Performed by: NURSE PRACTITIONER

## 2018-06-11 PROCEDURE — 99401 PREV MED CNSL INDIV APPRX 15: CPT | Performed by: NURSE PRACTITIONER

## 2018-06-11 PROCEDURE — 4040F PNEUMOC VAC/ADMIN/RCVD: CPT | Performed by: NURSE PRACTITIONER

## 2018-06-11 PROCEDURE — 3017F COLORECTAL CA SCREEN DOC REV: CPT | Performed by: NURSE PRACTITIONER

## 2018-06-11 PROCEDURE — G8598 ASA/ANTIPLAT THER USED: HCPCS | Performed by: NURSE PRACTITIONER

## 2018-06-11 PROCEDURE — G8420 CALC BMI NORM PARAMETERS: HCPCS | Performed by: NURSE PRACTITIONER

## 2018-06-11 PROCEDURE — 1090F PRES/ABSN URINE INCON ASSESS: CPT | Performed by: NURSE PRACTITIONER

## 2018-06-11 PROCEDURE — 1036F TOBACCO NON-USER: CPT | Performed by: NURSE PRACTITIONER

## 2018-06-11 RX ORDER — OXYCODONE AND ACETAMINOPHEN 10; 325 MG/1; MG/1
1 TABLET ORAL EVERY 6 HOURS PRN
Qty: 40 TABLET | Refills: 0 | Status: CANCELLED | OUTPATIENT
Start: 2018-06-11 | End: 2018-06-21

## 2018-06-11 ASSESSMENT — ENCOUNTER SYMPTOMS
SORE THROAT: 0
BLOOD IN STOOL: 0
ABDOMINAL DISTENTION: 0
SHORTNESS OF BREATH: 0
EYE DISCHARGE: 0
DIARRHEA: 0
WHEEZING: 0
ABDOMINAL PAIN: 0
BACK PAIN: 1
TROUBLE SWALLOWING: 0
CHOKING: 0
NAUSEA: 0
VOMITING: 0
COLOR CHANGE: 0
COUGH: 0
STRIDOR: 0
CONSTIPATION: 0
EYE ITCHING: 0

## 2018-06-13 ENCOUNTER — TELEPHONE (OUTPATIENT)
Dept: INTERNAL MEDICINE | Age: 73
End: 2018-06-13

## 2018-06-14 ENCOUNTER — TELEPHONE (OUTPATIENT)
Dept: INTERNAL MEDICINE | Age: 73
End: 2018-06-14

## 2018-06-14 DIAGNOSIS — G89.3 CHRONIC PAIN DUE TO NEOPLASM: ICD-10-CM

## 2018-06-14 DIAGNOSIS — G89.29 CHRONIC LOW BACK PAIN WITH LEFT-SIDED SCIATICA, UNSPECIFIED BACK PAIN LATERALITY: Primary | ICD-10-CM

## 2018-06-14 DIAGNOSIS — M54.42 CHRONIC LOW BACK PAIN WITH LEFT-SIDED SCIATICA, UNSPECIFIED BACK PAIN LATERALITY: Primary | ICD-10-CM

## 2018-06-14 RX ORDER — OXYCODONE AND ACETAMINOPHEN 10; 325 MG/1; MG/1
1 TABLET ORAL EVERY 6 HOURS PRN
Qty: 56 TABLET | Refills: 0 | Status: SHIPPED | OUTPATIENT
Start: 2018-06-14 | End: 2018-06-28

## 2018-08-20 RX ORDER — LISINOPRIL 5 MG/1
5 TABLET ORAL DAILY
Qty: 90 TABLET | Refills: 1 | Status: SHIPPED | OUTPATIENT
Start: 2018-08-20 | End: 2019-02-13 | Stop reason: SDUPTHER

## 2018-08-27 RX ORDER — GABAPENTIN 300 MG/1
CAPSULE ORAL
Refills: 0 | COMMUNITY
Start: 2018-06-27

## 2019-02-13 RX ORDER — LISINOPRIL 5 MG/1
TABLET ORAL
Qty: 90 TABLET | Refills: 1 | Status: SHIPPED | OUTPATIENT
Start: 2019-02-13

## 2020-01-17 RX ORDER — CARVEDILOL 12.5 MG/1
TABLET ORAL
COMMUNITY
Start: 2019-07-30 | End: 2020-01-17 | Stop reason: SDUPTHER

## 2020-01-17 RX ORDER — OXYCODONE HYDROCHLORIDE 15 MG/1
15 TABLET ORAL 4 TIMES DAILY PRN
COMMUNITY

## 2020-01-17 RX ORDER — LISINOPRIL 5 MG/1
TABLET ORAL DAILY
COMMUNITY
Start: 2019-07-30 | End: 2020-09-22

## 2020-02-05 RX ORDER — ZOLPIDEM TARTRATE 10 MG/1
TABLET ORAL
Qty: 15 TABLET | Refills: 0 | OUTPATIENT
Start: 2020-02-05

## 2020-02-08 DIAGNOSIS — F41.9 ANXIETY: Primary | ICD-10-CM

## 2020-02-10 NOTE — TELEPHONE ENCOUNTER
Can you call and reschedule patient appointment she has requested a medication refill and we cannot fill it without her having a appointment?

## 2020-02-12 RX ORDER — ZOLPIDEM TARTRATE 10 MG/1
TABLET ORAL
Qty: 15 TABLET | Refills: 0 | Status: SHIPPED | OUTPATIENT
Start: 2020-02-12 | End: 2020-03-23

## 2020-03-23 DIAGNOSIS — F41.9 ANXIETY: ICD-10-CM

## 2020-03-23 DIAGNOSIS — G47.00 INSOMNIA, UNSPECIFIED TYPE: Primary | ICD-10-CM

## 2020-03-23 RX ORDER — ZOLPIDEM TARTRATE 10 MG/1
TABLET ORAL
Qty: 15 TABLET | Refills: 4 | Status: SHIPPED | OUTPATIENT
Start: 2020-03-23 | End: 2020-10-19

## 2020-04-15 ENCOUNTER — OFFICE VISIT (OUTPATIENT)
Dept: INTERNAL MEDICINE | Facility: CLINIC | Age: 75
End: 2020-04-15

## 2020-04-15 VITALS
BODY MASS INDEX: 21.71 KG/M2 | WEIGHT: 118 LBS | HEIGHT: 62 IN | HEART RATE: 78 BPM | SYSTOLIC BLOOD PRESSURE: 133 MMHG | DIASTOLIC BLOOD PRESSURE: 70 MMHG

## 2020-04-15 DIAGNOSIS — M54.42 CHRONIC BILATERAL LOW BACK PAIN WITH LEFT-SIDED SCIATICA: Primary | ICD-10-CM

## 2020-04-15 DIAGNOSIS — F51.01 PRIMARY INSOMNIA: ICD-10-CM

## 2020-04-15 DIAGNOSIS — G89.29 CHRONIC BILATERAL LOW BACK PAIN WITH LEFT-SIDED SCIATICA: Primary | ICD-10-CM

## 2020-04-15 DIAGNOSIS — I10 HYPERTENSION, BENIGN: ICD-10-CM

## 2020-04-15 PROBLEM — E55.9 VITAMIN D DEFICIENCY: Status: ACTIVE | Noted: 2017-08-16

## 2020-04-15 PROBLEM — M25.559 HIP PAIN: Status: ACTIVE | Noted: 2020-04-15

## 2020-04-15 PROBLEM — R91.8 LUNG MASS: Status: ACTIVE | Noted: 2017-08-16

## 2020-04-15 PROBLEM — G89.4 CHRONIC PAIN DISORDER: Status: ACTIVE | Noted: 2018-06-11

## 2020-04-15 PROBLEM — C85.10 B-CELL LYMPHOMA (HCC): Status: ACTIVE | Noted: 2017-08-16

## 2020-04-15 PROBLEM — I42.9 CARDIOMYOPATHY (HCC): Status: ACTIVE | Noted: 2020-04-15

## 2020-04-15 PROBLEM — G89.3 CHRONIC PAIN DUE TO NEOPLASM: Status: ACTIVE | Noted: 2017-12-19

## 2020-04-15 PROBLEM — E78.2 MIXED HYPERLIPIDEMIA: Status: ACTIVE | Noted: 2017-08-16

## 2020-04-15 PROCEDURE — 99442 PR PHYS/QHP TELEPHONE EVALUATION 11-20 MIN: CPT | Performed by: INTERNAL MEDICINE
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
## 2020-08-03 ENCOUNTER — OFFICE VISIT (OUTPATIENT)
Dept: INTERNAL MEDICINE | Facility: CLINIC | Age: 75
End: 2020-08-03

## 2020-08-03 VITALS
TEMPERATURE: 98.2 F | HEART RATE: 87 BPM | DIASTOLIC BLOOD PRESSURE: 74 MMHG | SYSTOLIC BLOOD PRESSURE: 136 MMHG | HEIGHT: 62 IN | BODY MASS INDEX: 21.49 KG/M2 | WEIGHT: 116.8 LBS | OXYGEN SATURATION: 95 %

## 2020-08-03 DIAGNOSIS — E78.2 MIXED HYPERLIPIDEMIA: ICD-10-CM

## 2020-08-03 DIAGNOSIS — I10 HYPERTENSION, BENIGN: Primary | ICD-10-CM

## 2020-08-03 PROCEDURE — 99213 OFFICE O/P EST LOW 20 MIN: CPT | Performed by: INTERNAL MEDICINE

## 2020-08-03 RX ORDER — ACETAMINOPHEN 160 MG
1 TABLET,DISINTEGRATING ORAL DAILY
Status: ON HOLD | COMMUNITY
End: 2021-11-23

## 2020-08-03 RX ORDER — PREGABALIN 50 MG/1
1 CAPSULE ORAL DAILY
Status: ON HOLD | COMMUNITY
Start: 2018-07-24 | End: 2021-11-23

## 2020-08-03 RX ORDER — LANOLIN ALCOHOL/MO/W.PET/CERES
50 CREAM (GRAM) TOPICAL DAILY
Status: ON HOLD | COMMUNITY
End: 2022-03-02

## 2020-08-03 NOTE — PROGRESS NOTES
Subjective   Mitzi Villafuerte is a 74 y.o. female.   Chief Complaint   Patient presents with   • Hypertension     3 month follow up   • Insomnia       3-month follow-up for patient with hypertension       The following portions of the patient's history were reviewed and updated as appropriate: allergies, current medications, past family history, past medical history, past social history, past surgical history and problem list.    Review of Systems   Constitutional: Negative for activity change, appetite change, fatigue, fever, unexpected weight gain and unexpected weight loss.   HENT: Negative for swollen glands, trouble swallowing and voice change.    Eyes: Negative for blurred vision and visual disturbance.   Respiratory: Negative for cough and shortness of breath.    Cardiovascular: Negative for chest pain, palpitations and leg swelling.   Gastrointestinal: Negative for abdominal pain, constipation, diarrhea, nausea, vomiting and indigestion.   Endocrine: Negative for cold intolerance, heat intolerance, polydipsia and polyphagia.   Genitourinary: Negative for dysuria and frequency.   Musculoskeletal: Negative for arthralgias, back pain, joint swelling and neck pain.   Skin: Negative for color change, rash and skin lesions.   Neurological: Negative for dizziness, weakness, headache, memory problem and confusion.   Hematological: Does not bruise/bleed easily.   Psychiatric/Behavioral: Negative for agitation, hallucinations and suicidal ideas. The patient is not nervous/anxious.        Objective   Past Medical History:   Diagnosis Date   • Fracture of hip (CMS/HCC)     left, due to lymphoma, treated non-operatively   • Hypertension    • Lymphoma (CMS/HCC)       Past Surgical History:   Procedure Laterality Date   • CHOLECYSTECTOMY     • HERNIA REPAIR     • HIP TROCHANTERIC NAILING WITH INTRAMEDULLARY HIP SCREW Left 2/2/2017    Procedure: HIP TROCANTERIC NAILING LONG WITH INTRAMEDULLARY HIP SCREW WITH CAST APPLICATION  TO LEFT HAND;  Surgeon: Beni Culp MD;  Location: [facility] OR;  Service:         Current Outpatient Medications:   •  carvedilol (Coreg) 12.5 MG tablet, Take 1 tablet by mouth Daily., Disp: , Rfl:   •  Cholecalciferol (VITAMIN D3) 50 MCG (2000 UT) capsule, Take 1 capsule by mouth Daily., Disp: , Rfl:   •  escitalopram (LEXAPRO) 10 MG tablet, Take 10 mg by mouth Daily., Disp: , Rfl:   •  lisinopril (PRINIVIL,ZESTRIL) 5 MG tablet, Take  by mouth Daily., Disp: , Rfl:   •  oxyCODONE (ROXICODONE) 15 MG immediate release tablet, Take 1 mg by mouth 4 (Four) Times a Day As Needed., Disp: , Rfl:   •  pregabalin (Lyrica) 50 MG capsule, Take 1 capsule by mouth Daily., Disp: , Rfl:   •  vitamin B-6 (PYRIDOXINE) 50 MG tablet, Take 50 mg by mouth Daily., Disp: , Rfl:   •  zolpidem (AMBIEN) 10 MG tablet, TAKE 1/2 TABLET BY MOUTH EVERY DAY AT BEDTIME, Disp: 15 tablet, Rfl: 4     Vitals:    08/03/20 1350   BP: 136/74   Pulse: 87   Temp: 98.2 °F (36.8 °C)   SpO2: 95%         08/03/20  1350   Weight: 53 kg (116 lb 12.8 oz)     Patient's Body mass index is 21.36 kg/m². BMI is within normal parameters. No follow-up required..      Physical Exam   Constitutional: She is oriented to person, place, and time. She appears well-developed and well-nourished.   HENT:   Head: Normocephalic and atraumatic.   Right Ear: External ear normal.   Left Ear: External ear normal.   Nose: Nose normal.   Mouth/Throat: Oropharynx is clear and moist.   Eyes: Pupils are equal, round, and reactive to light. Conjunctivae and EOM are normal.   Neck: Normal range of motion. Neck supple. No thyromegaly present.   Cardiovascular: Normal rate, regular rhythm, normal heart sounds and intact distal pulses.   Pulmonary/Chest: Effort normal and breath sounds normal.   Abdominal: Soft. Bowel sounds are normal.   Lymphadenopathy:     She has no cervical adenopathy.   Neurological: She is alert and oriented to person, place, and time.   Skin: Skin is warm and dry.    Psychiatric: She has a normal mood and affect. Her behavior is normal. Thought content normal.   Nursing note and vitals reviewed.            Assessment/Plan   Diagnoses and all orders for this visit:    1. Hypertension, benign (Primary)    2. Mixed hyperlipidemia      Patient's blood pressure is well controlled she is tolerating her medicines as prescribed no changes were made

## 2020-08-06 RX ORDER — ESCITALOPRAM OXALATE 10 MG/1
TABLET ORAL
Qty: 90 TABLET | Refills: 3 | Status: SHIPPED | OUTPATIENT
Start: 2020-08-06

## 2020-09-22 RX ORDER — LISINOPRIL 5 MG/1
TABLET ORAL
Qty: 90 TABLET | Refills: 3 | Status: SHIPPED | OUTPATIENT
Start: 2020-09-22 | End: 2021-09-07 | Stop reason: SDUPTHER

## 2020-10-19 DIAGNOSIS — G47.00 INSOMNIA, UNSPECIFIED TYPE: ICD-10-CM

## 2020-10-19 DIAGNOSIS — F41.9 ANXIETY: ICD-10-CM

## 2020-10-19 RX ORDER — ZOLPIDEM TARTRATE 10 MG/1
TABLET ORAL
Qty: 15 TABLET | Refills: 5 | Status: SHIPPED | OUTPATIENT
Start: 2020-10-19 | End: 2021-09-07 | Stop reason: SDUPTHER

## 2021-02-01 ENCOUNTER — OFFICE VISIT (OUTPATIENT)
Dept: INTERNAL MEDICINE | Facility: CLINIC | Age: 76
End: 2021-02-01

## 2021-03-08 ENCOUNTER — OFFICE VISIT (OUTPATIENT)
Dept: INTERNAL MEDICINE | Facility: CLINIC | Age: 76
End: 2021-03-08

## 2021-03-08 VITALS
HEIGHT: 62 IN | DIASTOLIC BLOOD PRESSURE: 70 MMHG | OXYGEN SATURATION: 98 % | WEIGHT: 115 LBS | SYSTOLIC BLOOD PRESSURE: 136 MMHG | HEART RATE: 107 BPM | TEMPERATURE: 97.7 F | BODY MASS INDEX: 21.16 KG/M2

## 2021-03-08 DIAGNOSIS — Z12.31 ENCOUNTER FOR SCREENING MAMMOGRAM FOR MALIGNANT NEOPLASM OF BREAST: ICD-10-CM

## 2021-03-08 DIAGNOSIS — Z00.00 MEDICARE ANNUAL WELLNESS VISIT, INITIAL: Primary | ICD-10-CM

## 2021-03-08 DIAGNOSIS — F51.01 PRIMARY INSOMNIA: ICD-10-CM

## 2021-03-08 DIAGNOSIS — Z78.0 POSTMENOPAUSE: ICD-10-CM

## 2021-03-08 PROCEDURE — G0439 PPPS, SUBSEQ VISIT: HCPCS | Performed by: NURSE PRACTITIONER

## 2021-04-01 DIAGNOSIS — Z12.31 ENCOUNTER FOR SCREENING MAMMOGRAM FOR MALIGNANT NEOPLASM OF BREAST: ICD-10-CM

## 2021-04-01 DIAGNOSIS — Z78.0 POSTMENOPAUSE: ICD-10-CM

## 2021-04-12 ENCOUNTER — TELEPHONE (OUTPATIENT)
Dept: INTERNAL MEDICINE | Facility: CLINIC | Age: 76
End: 2021-04-12

## 2021-04-12 NOTE — TELEPHONE ENCOUNTER
Caller: Mitzi Villafuerte    Relationship to patient: Self    Best call back number: [phone]    Patient is needing: Patient is needing to confirm that lab orders have been placed. She is in route to the office.

## 2021-04-13 LAB
25(OH)D3+25(OH)D2 SERPL-MCNC: 21.5 NG/ML (ref 30–100)
ALBUMIN SERPL-MCNC: 4.4 G/DL (ref 3.5–5.2)
ALBUMIN/GLOB SERPL: 1.8 G/DL
ALP SERPL-CCNC: 97 U/L (ref 39–117)
ALT SERPL-CCNC: 12 U/L (ref 1–33)
APPEARANCE UR: CLEAR
AST SERPL-CCNC: 23 U/L (ref 1–32)
BACTERIA #/AREA URNS HPF: ABNORMAL /HPF
BASOPHILS # BLD AUTO: 0.03 10*3/MM3 (ref 0–0.2)
BASOPHILS NFR BLD AUTO: 0.7 % (ref 0–1.5)
BILIRUB SERPL-MCNC: 0.5 MG/DL (ref 0–1.2)
BILIRUB UR QL STRIP: NEGATIVE
BUN SERPL-MCNC: 11 MG/DL (ref 8–23)
BUN/CREAT SERPL: 17.5 (ref 7–25)
CALCIUM SERPL-MCNC: 9.5 MG/DL (ref 8.6–10.5)
CASTS URNS MICRO: ABNORMAL
CHLORIDE SERPL-SCNC: 104 MMOL/L (ref 98–107)
CHOLEST SERPL-MCNC: 165 MG/DL (ref 0–200)
CO2 SERPL-SCNC: 28.4 MMOL/L (ref 22–29)
COLOR UR: YELLOW
CREAT SERPL-MCNC: 0.63 MG/DL (ref 0.57–1)
EOSINOPHIL # BLD AUTO: 0.09 10*3/MM3 (ref 0–0.4)
EOSINOPHIL NFR BLD AUTO: 2 % (ref 0.3–6.2)
EPI CELLS #/AREA URNS HPF: ABNORMAL /HPF
ERYTHROCYTE [DISTWIDTH] IN BLOOD BY AUTOMATED COUNT: 11.8 % (ref 12.3–15.4)
GLOBULIN SER CALC-MCNC: 2.4 GM/DL
GLUCOSE SERPL-MCNC: 88 MG/DL (ref 65–99)
GLUCOSE UR QL: NEGATIVE
HCT VFR BLD AUTO: 38.2 % (ref 34–46.6)
HCV AB S/CO SERPL IA: <0.1 S/CO RATIO (ref 0–0.9)
HDLC SERPL-MCNC: 50 MG/DL (ref 40–60)
HGB BLD-MCNC: 12.4 G/DL (ref 12–15.9)
HGB UR QL STRIP: NEGATIVE
IMM GRANULOCYTES # BLD AUTO: 0.01 10*3/MM3 (ref 0–0.05)
IMM GRANULOCYTES NFR BLD AUTO: 0.2 % (ref 0–0.5)
KETONES UR QL STRIP: NEGATIVE
LDLC SERPL CALC-MCNC: 99 MG/DL (ref 0–100)
LEUKOCYTE ESTERASE UR QL STRIP: ABNORMAL
LYMPHOCYTES # BLD AUTO: 1.4 10*3/MM3 (ref 0.7–3.1)
LYMPHOCYTES NFR BLD AUTO: 30.8 % (ref 19.6–45.3)
MCH RBC QN AUTO: 31.1 PG (ref 26.6–33)
MCHC RBC AUTO-ENTMCNC: 32.5 G/DL (ref 31.5–35.7)
MCV RBC AUTO: 95.7 FL (ref 79–97)
MONOCYTES # BLD AUTO: 0.41 10*3/MM3 (ref 0.1–0.9)
MONOCYTES NFR BLD AUTO: 9 % (ref 5–12)
NEUTROPHILS # BLD AUTO: 2.6 10*3/MM3 (ref 1.7–7)
NEUTROPHILS NFR BLD AUTO: 57.3 % (ref 42.7–76)
NITRITE UR QL STRIP: POSITIVE
NRBC BLD AUTO-RTO: 0 /100 WBC (ref 0–0.2)
PH UR STRIP: 6.5 [PH] (ref 5–8)
PLATELET # BLD AUTO: 252 10*3/MM3 (ref 140–450)
POTASSIUM SERPL-SCNC: 4.4 MMOL/L (ref 3.5–5.2)
PROT SERPL-MCNC: 6.8 G/DL (ref 6–8.5)
PROT UR QL STRIP: NEGATIVE
RBC # BLD AUTO: 3.99 10*6/MM3 (ref 3.77–5.28)
RBC #/AREA URNS HPF: ABNORMAL /HPF
SODIUM SERPL-SCNC: 141 MMOL/L (ref 136–145)
SP GR UR: 1.02 (ref 1–1.03)
TRIGL SERPL-MCNC: 84 MG/DL (ref 0–150)
TSH SERPL DL<=0.005 MIU/L-ACNC: 1.11 UIU/ML (ref 0.27–4.2)
URATE SERPL-MCNC: 4.9 MG/DL (ref 2.4–5.7)
UROBILINOGEN UR STRIP-MCNC: ABNORMAL MG/DL
VLDLC SERPL CALC-MCNC: 16 MG/DL (ref 5–40)
WBC # BLD AUTO: 4.54 10*3/MM3 (ref 3.4–10.8)
WBC #/AREA URNS HPF: ABNORMAL /HPF

## 2021-04-15 DIAGNOSIS — E55.9 VITAMIN D DEFICIENCY: ICD-10-CM

## 2021-04-15 DIAGNOSIS — M85.851 OSTEOPENIA OF NECK OF RIGHT FEMUR: Primary | ICD-10-CM

## 2021-04-15 RX ORDER — ERGOCALCIFEROL 1.25 MG/1
50000 CAPSULE ORAL
Qty: 3 CAPSULE | Refills: 3 | Status: SHIPPED | OUTPATIENT
Start: 2021-04-15 | End: 2022-04-07

## 2021-04-15 RX ORDER — NITROFURANTOIN 25; 75 MG/1; MG/1
100 CAPSULE ORAL 2 TIMES DAILY
Qty: 14 CAPSULE | Refills: 0 | Status: SHIPPED | OUTPATIENT
Start: 2021-04-15 | End: 2021-09-07

## 2021-04-15 RX ORDER — ALENDRONATE SODIUM 70 MG/1
70 TABLET ORAL
Qty: 12 TABLET | Refills: 3 | Status: ON HOLD | OUTPATIENT
Start: 2021-04-15 | End: 2022-03-02

## 2021-04-15 NOTE — TELEPHONE ENCOUNTER
----- Message from CARMEN Elizabeth sent at 4/13/2021 12:37 PM CDT -----  Vitamin D is low and UA shows infection. Please send vitamin D supplement monthly will recheck in 3 months and start macrobid 100 mg BID for 7 days, need to recheck UA with possible culture in 2 weeks to confirm resolution.

## 2021-04-20 ENCOUNTER — TELEPHONE (OUTPATIENT)
Dept: INTERNAL MEDICINE | Facility: CLINIC | Age: 76
End: 2021-04-20

## 2021-04-20 NOTE — TELEPHONE ENCOUNTER
----- Message from CARMEN Elizabeth sent at 4/20/2021  1:02 PM CDT -----   Osteopenia. Continue fosamax and vitamin D, make sure she is getting 1200 mg/dl of calcium daily.

## 2021-05-06 ENCOUNTER — CLINICAL SUPPORT (OUTPATIENT)
Dept: INTERNAL MEDICINE | Facility: CLINIC | Age: 76
End: 2021-05-06

## 2021-05-06 DIAGNOSIS — N30.01 ACUTE CYSTITIS WITH HEMATURIA: Primary | ICD-10-CM

## 2021-05-06 LAB
BILIRUB BLD-MCNC: NEGATIVE MG/DL
CLARITY, POC: CLEAR
COLOR UR: YELLOW
GLUCOSE UR STRIP-MCNC: NEGATIVE MG/DL
KETONES UR QL: NEGATIVE
LEUKOCYTE EST, POC: ABNORMAL
NITRITE UR-MCNC: POSITIVE MG/ML
PH UR: 5.5 [PH] (ref 5–8)
PROT UR STRIP-MCNC: NEGATIVE MG/DL
RBC # UR STRIP: ABNORMAL /UL
SP GR UR: 1.02 (ref 1–1.03)
UROBILINOGEN UR QL: NORMAL

## 2021-05-06 PROCEDURE — 81003 URINALYSIS AUTO W/O SCOPE: CPT | Performed by: NURSE PRACTITIONER

## 2021-05-06 RX ORDER — SULFAMETHOXAZOLE AND TRIMETHOPRIM 800; 160 MG/1; MG/1
1 TABLET ORAL 2 TIMES DAILY
Qty: 6 TABLET | Refills: 0 | Status: SHIPPED | OUTPATIENT
Start: 2021-05-06 | End: 2021-09-07

## 2021-05-09 LAB
BACTERIA UR CULT: ABNORMAL
BACTERIA UR CULT: ABNORMAL
OTHER ANTIBIOTIC SUSC ISLT: ABNORMAL

## 2021-05-10 ENCOUNTER — TELEPHONE (OUTPATIENT)
Dept: INTERNAL MEDICINE | Facility: CLINIC | Age: 76
End: 2021-05-10

## 2021-05-10 NOTE — TELEPHONE ENCOUNTER
----- Message from CARMEN Elizabeth sent at 5/10/2021  7:37 AM CDT -----  Ecoli infection, finish bactrim antibiotic.

## 2021-08-11 ENCOUNTER — OFFICE VISIT (OUTPATIENT)
Dept: FAMILY MEDICINE CLINIC | Age: 76
End: 2021-08-11
Payer: MEDICARE

## 2021-08-11 VITALS
HEART RATE: 124 BPM | WEIGHT: 105 LBS | HEIGHT: 62 IN | BODY MASS INDEX: 19.32 KG/M2 | SYSTOLIC BLOOD PRESSURE: 142 MMHG | DIASTOLIC BLOOD PRESSURE: 72 MMHG | TEMPERATURE: 97.1 F | RESPIRATION RATE: 20 BRPM

## 2021-08-11 DIAGNOSIS — R19.7 DIARRHEA, UNSPECIFIED TYPE: ICD-10-CM

## 2021-08-11 DIAGNOSIS — R00.0 TACHYCARDIA: Primary | ICD-10-CM

## 2021-08-11 DIAGNOSIS — R53.1 WEAKNESS: ICD-10-CM

## 2021-08-11 DIAGNOSIS — J02.0 ACUTE STREPTOCOCCAL PHARYNGITIS: ICD-10-CM

## 2021-08-11 DIAGNOSIS — Z20.822 EXPOSURE TO COVID-19 VIRUS: ICD-10-CM

## 2021-08-11 DIAGNOSIS — Z20.822 SUSPECTED COVID-19 VIRUS INFECTION: ICD-10-CM

## 2021-08-11 DIAGNOSIS — Z20.818 EXPOSURE TO STREP THROAT: ICD-10-CM

## 2021-08-11 DIAGNOSIS — Z20.822 EXPOSURE TO COVID-19 VIRUS: Primary | ICD-10-CM

## 2021-08-11 LAB
ADENOVIRUS BY PCR: NOT DETECTED
BORDETELLA PARAPERTUSSIS BY PCR: NOT DETECTED
BORDETELLA PERTUSSIS BY PCR: NOT DETECTED
CHLAMYDOPHILIA PNEUMONIAE BY PCR: NOT DETECTED
CORONAVIRUS 229E BY PCR: NOT DETECTED
CORONAVIRUS HKU1 BY PCR: NOT DETECTED
CORONAVIRUS NL63 BY PCR: NOT DETECTED
CORONAVIRUS OC43 BY PCR: NOT DETECTED
HUMAN METAPNEUMOVIRUS BY PCR: NOT DETECTED
HUMAN RHINOVIRUS/ENTEROVIRUS BY PCR: NOT DETECTED
INFLUENZA A BY PCR: NOT DETECTED
INFLUENZA B BY PCR: NOT DETECTED
MYCOPLASMA PNEUMONIAE BY PCR: NOT DETECTED
PARAINFLUENZA VIRUS 1 BY PCR: NOT DETECTED
PARAINFLUENZA VIRUS 2 BY PCR: NOT DETECTED
PARAINFLUENZA VIRUS 3 BY PCR: NOT DETECTED
PARAINFLUENZA VIRUS 4 BY PCR: NOT DETECTED
RESPIRATORY SYNCYTIAL VIRUS BY PCR: NOT DETECTED
S PYO AG THROAT QL: POSITIVE
SARS-COV-2, PCR: DETECTED

## 2021-08-11 PROCEDURE — G8427 DOCREV CUR MEDS BY ELIG CLIN: HCPCS | Performed by: NURSE PRACTITIONER

## 2021-08-11 PROCEDURE — 99205 OFFICE O/P NEW HI 60 MIN: CPT | Performed by: NURSE PRACTITIONER

## 2021-08-11 PROCEDURE — G8399 PT W/DXA RESULTS DOCUMENT: HCPCS | Performed by: NURSE PRACTITIONER

## 2021-08-11 PROCEDURE — 87880 STREP A ASSAY W/OPTIC: CPT | Performed by: NURSE PRACTITIONER

## 2021-08-11 PROCEDURE — 1123F ACP DISCUSS/DSCN MKR DOCD: CPT | Performed by: NURSE PRACTITIONER

## 2021-08-11 PROCEDURE — 3017F COLORECTAL CA SCREEN DOC REV: CPT | Performed by: NURSE PRACTITIONER

## 2021-08-11 PROCEDURE — 1036F TOBACCO NON-USER: CPT | Performed by: NURSE PRACTITIONER

## 2021-08-11 PROCEDURE — 4040F PNEUMOC VAC/ADMIN/RCVD: CPT | Performed by: NURSE PRACTITIONER

## 2021-08-11 PROCEDURE — 1090F PRES/ABSN URINE INCON ASSESS: CPT | Performed by: NURSE PRACTITIONER

## 2021-08-11 PROCEDURE — G8420 CALC BMI NORM PARAMETERS: HCPCS | Performed by: NURSE PRACTITIONER

## 2021-08-11 RX ORDER — ONDANSETRON 4 MG/1
4 TABLET, ORALLY DISINTEGRATING ORAL 3 TIMES DAILY PRN
Qty: 21 TABLET | Refills: 0 | Status: SHIPPED | OUTPATIENT
Start: 2021-08-11

## 2021-08-11 RX ORDER — GUAIFENESIN 600 MG/1
1200 TABLET, EXTENDED RELEASE ORAL 2 TIMES DAILY
Qty: 40 TABLET | Refills: 0 | Status: SHIPPED | OUTPATIENT
Start: 2021-08-11 | End: 2021-08-21

## 2021-08-11 RX ORDER — GUAIFENESIN 600 MG/1
1200 TABLET, EXTENDED RELEASE ORAL 2 TIMES DAILY
Qty: 40 TABLET | Refills: 0 | Status: SHIPPED | OUTPATIENT
Start: 2021-08-11 | End: 2021-08-11 | Stop reason: SDUPTHER

## 2021-08-11 RX ORDER — ONDANSETRON 4 MG/1
4 TABLET, ORALLY DISINTEGRATING ORAL 3 TIMES DAILY PRN
Qty: 21 TABLET | Refills: 0 | Status: SHIPPED | OUTPATIENT
Start: 2021-08-11 | End: 2021-08-11 | Stop reason: SDUPTHER

## 2021-08-11 RX ORDER — AMOXICILLIN 500 MG/1
500 CAPSULE ORAL 2 TIMES DAILY
Qty: 20 CAPSULE | Refills: 0 | Status: SHIPPED | OUTPATIENT
Start: 2021-08-11 | End: 2021-08-16 | Stop reason: SDUPTHER

## 2021-08-11 SDOH — ECONOMIC STABILITY: TRANSPORTATION INSECURITY
IN THE PAST 12 MONTHS, HAS LACK OF TRANSPORTATION KEPT YOU FROM MEETINGS, WORK, OR FROM GETTING THINGS NEEDED FOR DAILY LIVING?: NO

## 2021-08-11 SDOH — ECONOMIC STABILITY: FOOD INSECURITY: WITHIN THE PAST 12 MONTHS, YOU WORRIED THAT YOUR FOOD WOULD RUN OUT BEFORE YOU GOT MONEY TO BUY MORE.: NEVER TRUE

## 2021-08-11 SDOH — ECONOMIC STABILITY: TRANSPORTATION INSECURITY
IN THE PAST 12 MONTHS, HAS THE LACK OF TRANSPORTATION KEPT YOU FROM MEDICAL APPOINTMENTS OR FROM GETTING MEDICATIONS?: NO

## 2021-08-11 SDOH — ECONOMIC STABILITY: FOOD INSECURITY: WITHIN THE PAST 12 MONTHS, THE FOOD YOU BOUGHT JUST DIDN'T LAST AND YOU DIDN'T HAVE MONEY TO GET MORE.: NEVER TRUE

## 2021-08-11 ASSESSMENT — ENCOUNTER SYMPTOMS
COUGH: 1
VOMITING: 1
DIARRHEA: 1
SORE THROAT: 1
SHORTNESS OF BREATH: 0
NAUSEA: 1

## 2021-08-11 ASSESSMENT — SOCIAL DETERMINANTS OF HEALTH (SDOH): HOW HARD IS IT FOR YOU TO PAY FOR THE VERY BASICS LIKE FOOD, HOUSING, MEDICAL CARE, AND HEATING?: NOT HARD AT ALL
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
## 2021-08-12 DIAGNOSIS — E87.6 LOW SERUM POTASSIUM: Primary | ICD-10-CM

## 2021-08-12 RX ORDER — POTASSIUM CHLORIDE 20 MEQ/1
20 TABLET, EXTENDED RELEASE ORAL DAILY
Qty: 3 TABLET | Refills: 0 | Status: SHIPPED | OUTPATIENT
Start: 2021-08-12

## 2021-08-13 ENCOUNTER — TELEPHONE (OUTPATIENT)
Dept: INTERNAL MEDICINE | Facility: CLINIC | Age: 76
End: 2021-08-13

## 2021-08-13 NOTE — TELEPHONE ENCOUNTER
Jayden is being called back and instructed that pt needs to go to ER and be evaluated and infusion if needed can be started.

## 2021-08-13 NOTE — TELEPHONE ENCOUNTER
Caller: Mitzi Villafuerte    Relationship: Self    What orders are you requesting (i.e. lab or imaging): COVID INFUSION SHOT    In what timeframe would the patient need to come in: ASAP     Where will you receive your lab/imaging services: WHEREVER NECESSARY

## 2021-08-13 NOTE — TELEPHONE ENCOUNTER
Spoke with [facility] and they stated even though no one is on a verbal, it is ok to call caller back and inform her it is ok to take her to Er since she is so worried about her that she is weak and can't hardly talk. And That they can take care of her there if she needs to be retested for anything, admitted or infusion. Called refuses to take her to ER as she called  ER last night and they told them to call PCP as we do infusion in office. Informed her we don't and if pt is weak and can't talk is best to take to ER. She wanted us to call pt as she just talked to her and she was talking just fine. I informed her have pt contact us when she can to discuss health.

## 2021-08-13 NOTE — TELEPHONE ENCOUNTER
Donna niece called stating the pt has been sick for 7 days , tested positive 2 days ago, is weak, can't hardly talk on phone, pt son is in [facility] for covid, it's either her daughter or daughter n law home with covid. She is worried about her aunt/this pt. No one is on the verbal. I did inform her of this. She is upset/crying wants her to have infusion. She had heard this helps with covid. I suggested family go check on her and be there to help if/when we were able to call. She said she would send her cousin.

## 2021-08-13 NOTE — TELEPHONE ENCOUNTER
Spoke with pt, let her know if she was as sick as we had been informed. She needed to go to the ER to be seen and they would advise on infusion from there

## 2021-08-16 DIAGNOSIS — J02.0 ACUTE STREPTOCOCCAL PHARYNGITIS: ICD-10-CM

## 2021-08-16 RX ORDER — AMOXICILLIN 500 MG/1
500 CAPSULE ORAL 2 TIMES DAILY
Qty: 20 CAPSULE | Refills: 0 | Status: SHIPPED | OUTPATIENT
Start: 2021-08-16 | End: 2021-08-26

## 2021-09-07 ENCOUNTER — OFFICE VISIT (OUTPATIENT)
Dept: INTERNAL MEDICINE | Facility: CLINIC | Age: 76
End: 2021-09-07

## 2021-09-07 VITALS
TEMPERATURE: 97.9 F | DIASTOLIC BLOOD PRESSURE: 78 MMHG | SYSTOLIC BLOOD PRESSURE: 124 MMHG | HEART RATE: 105 BPM | HEIGHT: 62 IN | WEIGHT: 108 LBS | BODY MASS INDEX: 19.88 KG/M2 | OXYGEN SATURATION: 98 %

## 2021-09-07 DIAGNOSIS — G89.3 CHRONIC PAIN DUE TO NEOPLASM: ICD-10-CM

## 2021-09-07 DIAGNOSIS — I10 HYPERTENSION, BENIGN: Primary | ICD-10-CM

## 2021-09-07 DIAGNOSIS — G47.00 INSOMNIA, UNSPECIFIED TYPE: ICD-10-CM

## 2021-09-07 PROBLEM — I25.5 ISCHEMIC CARDIOMYOPATHY: Status: ACTIVE | Noted: 2017-08-16

## 2021-09-07 PROCEDURE — 99214 OFFICE O/P EST MOD 30 MIN: CPT | Performed by: INTERNAL MEDICINE

## 2021-09-07 RX ORDER — LISINOPRIL 5 MG/1
5 TABLET ORAL DAILY
Qty: 90 TABLET | Refills: 3 | Status: SHIPPED | OUTPATIENT
Start: 2021-09-07 | End: 2021-11-25 | Stop reason: HOSPADM

## 2021-09-07 RX ORDER — ZOLPIDEM TARTRATE 10 MG/1
5 TABLET ORAL NIGHTLY PRN
Qty: 15 TABLET | Refills: 5 | Status: ON HOLD | OUTPATIENT
Start: 2021-09-07 | End: 2022-03-02

## 2021-09-07 NOTE — PROGRESS NOTES
Subjective   Mitzi Villafuerte is a 75 y.o. female.   Chief Complaint   Patient presents with   • Hypertension     6 month follow up       History of Present Illness patient has longstanding hypertension as well as chronic pain.  She is taking all of her medication as prescribed.  She also has a sleep disorder for which she uses Ambien on a regular basis.    The following portions of the patient's history were reviewed and updated as appropriate: allergies, current medications, past family history, past medical history, past social history, past surgical history and problem list.    Review of Systems   Constitutional: Negative for activity change, appetite change, fatigue, fever, unexpected weight gain and unexpected weight loss.   HENT: Negative for swollen glands, trouble swallowing and voice change.    Eyes: Negative for blurred vision and visual disturbance.   Respiratory: Negative for cough and shortness of breath.    Cardiovascular: Negative for chest pain, palpitations and leg swelling.   Gastrointestinal: Negative for abdominal pain, constipation, diarrhea, nausea, vomiting and indigestion.   Endocrine: Negative for cold intolerance, heat intolerance, polydipsia and polyphagia.   Genitourinary: Negative for dysuria and frequency.   Musculoskeletal: Negative for arthralgias, back pain, joint swelling and neck pain.   Skin: Negative for color change, rash and skin lesions.   Neurological: Negative for dizziness, weakness, headache, memory problem and confusion.   Hematological: Does not bruise/bleed easily.   Psychiatric/Behavioral: Positive for sleep disturbance. Negative for agitation, hallucinations and suicidal ideas. The patient is not nervous/anxious.        Objective   Past Medical History:   Diagnosis Date   • Fracture of hip (CMS/HCC)     left, due to lymphoma, treated non-operatively   • Hypertension    • Lymphoma (CMS/HCC)       Past Surgical History:   Procedure Laterality Date   • CHOLECYSTECTOMY      • HERNIA REPAIR     • HIP TROCHANTERIC NAILING WITH INTRAMEDULLARY HIP SCREW Left 2/2/2017    Procedure: HIP TROCANTERIC NAILING LONG WITH INTRAMEDULLARY HIP SCREW WITH CAST APPLICATION TO LEFT HAND;  Surgeon: Beni Culp MD;  Location: [facility];  Service:         Current Outpatient Medications:   •  alendronate (Fosamax) 70 MG tablet, Take 1 tablet by mouth Every 7 (Seven) Days., Disp: 12 tablet, Rfl: 3  •  carvedilol (Coreg) 12.5 MG tablet, Take 1 tablet by mouth Daily., Disp: , Rfl:   •  Cholecalciferol (VITAMIN D3) 50 MCG (2000 UT) capsule, Take 1 capsule by mouth Daily., Disp: , Rfl:   •  escitalopram (LEXAPRO) 10 MG tablet, TAKE 1 TABLET BY MOUTH EVERY DAY, Disp: 90 tablet, Rfl: 3  •  lisinopril (PRINIVIL,ZESTRIL) 5 MG tablet, TAKE 1 TABLET BY MOUTH EVERY DAY, Disp: 90 tablet, Rfl: 3  •  oxyCODONE (ROXICODONE) 15 MG immediate release tablet, Take 1 mg by mouth 4 (Four) Times a Day As Needed., Disp: , Rfl:   •  pregabalin (Lyrica) 50 MG capsule, Take 1 capsule by mouth Daily., Disp: , Rfl:   •  vitamin B-6 (PYRIDOXINE) 50 MG tablet, Take 50 mg by mouth Daily., Disp: , Rfl:   •  vitamin D (ERGOCALCIFEROL) 1.25 MG (93758 UT) capsule capsule, Take 1 capsule by mouth Every 30 (Thirty) Days., Disp: 3 capsule, Rfl: 3  •  zolpidem (AMBIEN) 10 MG tablet, TAKE 1/2 TABLET BY MOUTH EVERY DAY AT BEDTIME, Disp: 15 tablet, Rfl: 5      Vitals:    09/07/21 0923   BP: 124/78   Pulse: 105   Temp: 97.9 °F (36.6 °C)   SpO2: 98%         09/07/21 0923   Weight: 49 kg (108 lb)       Body mass index is 19.75 kg/m².    Physical Exam  Constitutional:       Appearance: She is well-developed.   HENT:      Head: Normocephalic and atraumatic.   Eyes:      Pupils: Pupils are equal, round, and reactive to light.   Cardiovascular:      Rate and Rhythm: Normal rate and regular rhythm.   Pulmonary:      Effort: Pulmonary effort is normal.      Breath sounds: Normal breath sounds.   Abdominal:      General: Bowel sounds are normal.       Palpations: Abdomen is soft.   Musculoskeletal:         General: Normal range of motion.      Cervical back: Normal range of motion and neck supple.   Skin:     General: Skin is warm and dry.   Neurological:      Mental Status: She is alert and oriented to person, place, and time.   Psychiatric:         Behavior: Behavior normal.               Assessment/Plan   Diagnoses and all orders for this visit:    1. Hypertension, benign (Primary)    2. Insomnia, unspecified type  -     zolpidem (AMBIEN) 10 MG tablet; Take 0.5 tablets by mouth At Night As Needed for Sleep.  Dispense: 15 tablet; Refill: 5    3. Chronic pain due to neoplasm    Other orders  -     lisinopril (PRINIVIL,ZESTRIL) 5 MG tablet; Take 1 tablet by mouth Daily.  Dispense: 90 tablet; Refill: 3      At today's visit we refilled patient's Ambien and her lisinopril her blood pressure is under good control she is getting her pain medications through another provider so we are not taking care of that for her today.  She will see me back in 6 months for annual checkup.

## 2021-11-22 ENCOUNTER — HOSPITAL ENCOUNTER (INPATIENT)
Facility: HOSPITAL | Age: 76
LOS: 2 days | Discharge: HOME | End: 2021-11-25
Attending: STUDENT IN AN ORGANIZED HEALTH CARE EDUCATION/TRAINING PROGRAM | Admitting: INTERNAL MEDICINE

## 2021-11-22 ENCOUNTER — APPOINTMENT (OUTPATIENT)
Dept: GENERAL RADIOLOGY | Facility: HOSPITAL | Age: 76
End: 2021-11-22

## 2021-11-22 ENCOUNTER — APPOINTMENT (OUTPATIENT)
Dept: CT IMAGING | Facility: HOSPITAL | Age: 76
End: 2021-11-22

## 2021-11-22 DIAGNOSIS — I48.91 ATRIAL FIBRILLATION, UNSPECIFIED TYPE (HCC): ICD-10-CM

## 2021-11-22 DIAGNOSIS — J90 PLEURAL EFFUSION: Primary | ICD-10-CM

## 2021-11-22 LAB
ALBUMIN SERPL-MCNC: 3.6 G/DL (ref 3.5–5.2)
ALBUMIN/GLOB SERPL: 1.2 G/DL
ALP SERPL-CCNC: 126 U/L (ref 39–117)
ALT SERPL W P-5'-P-CCNC: 120 U/L (ref 1–33)
ANION GAP SERPL CALCULATED.3IONS-SCNC: 16 MMOL/L (ref 5–15)
APTT PPP: 26.6 SECONDS (ref 24.1–35)
ARTERIAL PATENCY WRIST A: POSITIVE
AST SERPL-CCNC: 84 U/L (ref 1–32)
ATMOSPHERIC PRESS: 758 MMHG
BASE EXCESS BLDA CALC-SCNC: -3.6 MMOL/L (ref 0–2)
BASOPHILS # BLD AUTO: 0.01 10*3/MM3 (ref 0–0.2)
BASOPHILS NFR BLD AUTO: 0.1 % (ref 0–1.5)
BDY SITE: ABNORMAL
BILIRUB SERPL-MCNC: 1.4 MG/DL (ref 0–1.2)
BODY TEMPERATURE: 37 C
BUN SERPL-MCNC: 22 MG/DL (ref 8–23)
BUN/CREAT SERPL: 40 (ref 7–25)
CALCIUM SPEC-SCNC: 8.5 MG/DL (ref 8.6–10.5)
CHLORIDE SERPL-SCNC: 104 MMOL/L (ref 98–107)
CO2 SERPL-SCNC: 18 MMOL/L (ref 22–29)
CREAT SERPL-MCNC: 0.55 MG/DL (ref 0.57–1)
D-LACTATE SERPL-SCNC: 2.7 MMOL/L (ref 0.5–2)
DEPRECATED RDW RBC AUTO: 53.1 FL (ref 37–54)
EOSINOPHIL # BLD AUTO: 0 10*3/MM3 (ref 0–0.4)
EOSINOPHIL NFR BLD AUTO: 0 % (ref 0.3–6.2)
ERYTHROCYTE [DISTWIDTH] IN BLOOD BY AUTOMATED COUNT: 15.1 % (ref 12.3–15.4)
FLUAV RNA RESP QL NAA+PROBE: NOT DETECTED
FLUBV RNA RESP QL NAA+PROBE: NOT DETECTED
GAS FLOW AIRWAY: 4 LPM
GFR SERPL CREATININE-BSD FRML MDRD: 107 ML/MIN/1.73
GLOBULIN UR ELPH-MCNC: 2.9 GM/DL
GLUCOSE SERPL-MCNC: 139 MG/DL (ref 65–99)
HCO3 BLDA-SCNC: 19.7 MMOL/L (ref 20–26)
HCT VFR BLD AUTO: 38.9 % (ref 34–46.6)
HGB BLD-MCNC: 12 G/DL (ref 12–15.9)
HOLD SPECIMEN: NORMAL
IMM GRANULOCYTES # BLD AUTO: 0.05 10*3/MM3 (ref 0–0.05)
IMM GRANULOCYTES NFR BLD AUTO: 0.5 % (ref 0–0.5)
INR PPP: 1.42 (ref 0.91–1.09)
LYMPHOCYTES # BLD AUTO: 0.97 10*3/MM3 (ref 0.7–3.1)
LYMPHOCYTES NFR BLD AUTO: 9 % (ref 19.6–45.3)
Lab: ABNORMAL
MCH RBC QN AUTO: 30.8 PG (ref 26.6–33)
MCHC RBC AUTO-ENTMCNC: 30.8 G/DL (ref 31.5–35.7)
MCV RBC AUTO: 100 FL (ref 79–97)
MODALITY: ABNORMAL
MONOCYTES # BLD AUTO: 0.71 10*3/MM3 (ref 0.1–0.9)
MONOCYTES NFR BLD AUTO: 6.6 % (ref 5–12)
NEUTROPHILS NFR BLD AUTO: 83.8 % (ref 42.7–76)
NEUTROPHILS NFR BLD AUTO: 9.05 10*3/MM3 (ref 1.7–7)
NRBC BLD AUTO-RTO: 0 /100 WBC (ref 0–0.2)
PCO2 BLDA: 29.6 MM HG (ref 35–45)
PCO2 TEMP ADJ BLD: 29.6 MM HG (ref 35–45)
PH BLDA: 7.43 PH UNITS (ref 7.35–7.45)
PH, TEMP CORRECTED: 7.43 PH UNITS (ref 7.35–7.45)
PLATELET # BLD AUTO: 271 10*3/MM3 (ref 140–450)
PMV BLD AUTO: 10.5 FL (ref 6–12)
PO2 BLDA: 128 MM HG (ref 83–108)
PO2 TEMP ADJ BLD: 128 MM HG (ref 83–108)
POTASSIUM SERPL-SCNC: 4.2 MMOL/L (ref 3.5–5.2)
PROT SERPL-MCNC: 6.5 G/DL (ref 6–8.5)
PROTHROMBIN TIME: 16.8 SECONDS (ref 11.9–14.6)
RBC # BLD AUTO: 3.89 10*6/MM3 (ref 3.77–5.28)
RSV RNA NPH QL NAA+NON-PROBE: NOT DETECTED
SAO2 % BLDCOA: 100 % (ref 94–99)
SARS-COV-2 RNA RESP QL NAA+PROBE: NOT DETECTED
SODIUM SERPL-SCNC: 138 MMOL/L (ref 136–145)
T4 FREE SERPL-MCNC: 1.35 NG/DL (ref 0.93–1.7)
TROPONIN T SERPL-MCNC: <0.01 NG/ML (ref 0–0.03)
TROPONIN T SERPL-MCNC: <0.01 NG/ML (ref 0–0.03)
TSH SERPL DL<=0.05 MIU/L-ACNC: 2.38 UIU/ML (ref 0.27–4.2)
VENTILATOR MODE: ABNORMAL
WBC NRBC COR # BLD: 10.79 10*3/MM3 (ref 3.4–10.8)
WHOLE BLOOD HOLD SPECIMEN: NORMAL
WHOLE BLOOD HOLD SPECIMEN: NORMAL

## 2021-11-22 PROCEDURE — 93010 ELECTROCARDIOGRAM REPORT: CPT | Performed by: INTERNAL MEDICINE

## 2021-11-22 PROCEDURE — 83605 ASSAY OF LACTIC ACID: CPT | Performed by: STUDENT IN AN ORGANIZED HEALTH CARE EDUCATION/TRAINING PROGRAM

## 2021-11-22 PROCEDURE — 80053 COMPREHEN METABOLIC PANEL: CPT | Performed by: STUDENT IN AN ORGANIZED HEALTH CARE EDUCATION/TRAINING PROGRAM

## 2021-11-22 PROCEDURE — 0 IOPAMIDOL PER 1 ML: Performed by: STUDENT IN AN ORGANIZED HEALTH CARE EDUCATION/TRAINING PROGRAM

## 2021-11-22 PROCEDURE — 99284 EMERGENCY DEPT VISIT MOD MDM: CPT

## 2021-11-22 PROCEDURE — 83880 ASSAY OF NATRIURETIC PEPTIDE: CPT | Performed by: STUDENT IN AN ORGANIZED HEALTH CARE EDUCATION/TRAINING PROGRAM

## 2021-11-22 PROCEDURE — 87637 SARSCOV2&INF A&B&RSV AMP PRB: CPT | Performed by: STUDENT IN AN ORGANIZED HEALTH CARE EDUCATION/TRAINING PROGRAM

## 2021-11-22 PROCEDURE — 84484 ASSAY OF TROPONIN QUANT: CPT | Performed by: STUDENT IN AN ORGANIZED HEALTH CARE EDUCATION/TRAINING PROGRAM

## 2021-11-22 PROCEDURE — 84443 ASSAY THYROID STIM HORMONE: CPT | Performed by: STUDENT IN AN ORGANIZED HEALTH CARE EDUCATION/TRAINING PROGRAM

## 2021-11-22 PROCEDURE — 93005 ELECTROCARDIOGRAM TRACING: CPT | Performed by: STUDENT IN AN ORGANIZED HEALTH CARE EDUCATION/TRAINING PROGRAM

## 2021-11-22 PROCEDURE — 71045 X-RAY EXAM CHEST 1 VIEW: CPT

## 2021-11-22 PROCEDURE — 85610 PROTHROMBIN TIME: CPT | Performed by: STUDENT IN AN ORGANIZED HEALTH CARE EDUCATION/TRAINING PROGRAM

## 2021-11-22 PROCEDURE — 85730 THROMBOPLASTIN TIME PARTIAL: CPT | Performed by: STUDENT IN AN ORGANIZED HEALTH CARE EDUCATION/TRAINING PROGRAM

## 2021-11-22 PROCEDURE — 84439 ASSAY OF FREE THYROXINE: CPT | Performed by: STUDENT IN AN ORGANIZED HEALTH CARE EDUCATION/TRAINING PROGRAM

## 2021-11-22 PROCEDURE — 36600 WITHDRAWAL OF ARTERIAL BLOOD: CPT

## 2021-11-22 PROCEDURE — 82803 BLOOD GASES ANY COMBINATION: CPT

## 2021-11-22 PROCEDURE — 71275 CT ANGIOGRAPHY CHEST: CPT

## 2021-11-22 PROCEDURE — 85025 COMPLETE CBC W/AUTO DIFF WBC: CPT | Performed by: STUDENT IN AN ORGANIZED HEALTH CARE EDUCATION/TRAINING PROGRAM

## 2021-11-22 PROCEDURE — 93005 ELECTROCARDIOGRAM TRACING: CPT

## 2021-11-22 RX ORDER — DILTIAZEM HYDROCHLORIDE 5 MG/ML
10 INJECTION INTRAVENOUS ONCE
Status: COMPLETED | OUTPATIENT
Start: 2021-11-22 | End: 2021-11-22

## 2021-11-22 RX ADMIN — DILTIAZEM HYDROCHLORIDE 10 MG: 5 INJECTION INTRAVENOUS at 21:43

## 2021-11-22 RX ADMIN — IOPAMIDOL 100 ML: 755 INJECTION, SOLUTION INTRAVENOUS at 22:48

## 2021-11-22 RX ADMIN — SODIUM CHLORIDE, POTASSIUM CHLORIDE, SODIUM LACTATE AND CALCIUM CHLORIDE 1000 ML: 600; 310; 30; 20 INJECTION, SOLUTION INTRAVENOUS at 21:44

## 2021-11-23 ENCOUNTER — APPOINTMENT (OUTPATIENT)
Dept: GENERAL RADIOLOGY | Facility: HOSPITAL | Age: 76
End: 2021-11-23

## 2021-11-23 PROBLEM — J90 PLEURAL EFFUSION: Status: ACTIVE | Noted: 2021-11-23

## 2021-11-23 PROBLEM — I48.91 LONE ATRIAL FIBRILLATION: Status: ACTIVE | Noted: 2021-11-23

## 2021-11-23 LAB
AMYLASE FLD-CCNC: 9 U/L
ANION GAP SERPL CALCULATED.3IONS-SCNC: 15 MMOL/L (ref 5–15)
APPEARANCE FLD: ABNORMAL
BASOPHILS # BLD AUTO: 0.02 10*3/MM3 (ref 0–0.2)
BASOPHILS NFR BLD AUTO: 0.2 % (ref 0–1.5)
BUN SERPL-MCNC: 20 MG/DL (ref 8–23)
BUN/CREAT SERPL: 34.5 (ref 7–25)
CALCIUM SPEC-SCNC: 8.8 MG/DL (ref 8.6–10.5)
CHLORIDE SERPL-SCNC: 100 MMOL/L (ref 98–107)
CO2 SERPL-SCNC: 26 MMOL/L (ref 22–29)
COLOR FLD: YELLOW
CREAT SERPL-MCNC: 0.58 MG/DL (ref 0.57–1)
D-LACTATE SERPL-SCNC: 1.6 MMOL/L (ref 0.5–2)
DEPRECATED RDW RBC AUTO: 52.5 FL (ref 37–54)
EOSINOPHIL # BLD AUTO: 0.01 10*3/MM3 (ref 0–0.4)
EOSINOPHIL NFR BLD AUTO: 0.1 % (ref 0.3–6.2)
EOSINOPHIL NFR FLD MANUAL: 1 %
ERYTHROCYTE [DISTWIDTH] IN BLOOD BY AUTOMATED COUNT: 15.2 % (ref 12.3–15.4)
GFR SERPL CREATININE-BSD FRML MDRD: 101 ML/MIN/1.73
GLUCOSE FLD-MCNC: 121 MG/DL
GLUCOSE SERPL-MCNC: 98 MG/DL (ref 65–99)
HCT VFR BLD AUTO: 40.1 % (ref 34–46.6)
HGB BLD-MCNC: 12.9 G/DL (ref 12–15.9)
HOLD SPECIMEN: NORMAL
IMM GRANULOCYTES # BLD AUTO: 0.05 10*3/MM3 (ref 0–0.05)
IMM GRANULOCYTES NFR BLD AUTO: 0.5 % (ref 0–0.5)
LDH FLD-CCNC: 78 U/L
LYMPHOCYTES # BLD AUTO: 1.18 10*3/MM3 (ref 0.7–3.1)
LYMPHOCYTES NFR BLD AUTO: 11.3 % (ref 19.6–45.3)
LYMPHOCYTES NFR FLD MANUAL: 19 %
MACROPHAGE FLUID: 5 %
MCH RBC QN AUTO: 31.5 PG (ref 26.6–33)
MCHC RBC AUTO-ENTMCNC: 32.2 G/DL (ref 31.5–35.7)
MCV RBC AUTO: 97.8 FL (ref 79–97)
MESOTHL CELL NFR FLD MANUAL: 3 %
MONOCYTES # BLD AUTO: 0.71 10*3/MM3 (ref 0.1–0.9)
MONOCYTES NFR BLD AUTO: 6.8 % (ref 5–12)
MONOCYTES NFR FLD: 3 %
NEUTROPHILS NFR BLD AUTO: 8.5 10*3/MM3 (ref 1.7–7)
NEUTROPHILS NFR BLD AUTO: 81.1 % (ref 42.7–76)
NEUTROPHILS NFR FLD MANUAL: 69 %
NRBC BLD AUTO-RTO: 0 /100 WBC (ref 0–0.2)
NT-PROBNP SERPL-MCNC: ABNORMAL PG/ML (ref 0–1800)
PLATELET # BLD AUTO: 272 10*3/MM3 (ref 140–450)
PMV BLD AUTO: 10.3 FL (ref 6–12)
POTASSIUM SERPL-SCNC: 4.4 MMOL/L (ref 3.5–5.2)
PROT FLD-MCNC: 1.9 G/DL
RBC # BLD AUTO: 4.1 10*6/MM3 (ref 3.77–5.28)
RBC # FLD AUTO: <1000 /MM3
SODIUM SERPL-SCNC: 141 MMOL/L (ref 136–145)
WBC # FLD AUTO: 310 /MM3
WBC NRBC COR # BLD: 10.47 10*3/MM3 (ref 3.4–10.8)

## 2021-11-23 PROCEDURE — 87015 SPECIMEN INFECT AGNT CONCNTJ: CPT | Performed by: NURSE PRACTITIONER

## 2021-11-23 PROCEDURE — 87070 CULTURE OTHR SPECIMN AEROBIC: CPT | Performed by: NURSE PRACTITIONER

## 2021-11-23 PROCEDURE — 71045 X-RAY EXAM CHEST 1 VIEW: CPT

## 2021-11-23 PROCEDURE — 93010 ELECTROCARDIOGRAM REPORT: CPT | Performed by: INTERNAL MEDICINE

## 2021-11-23 PROCEDURE — 83690 ASSAY OF LIPASE: CPT | Performed by: NURSE PRACTITIONER

## 2021-11-23 PROCEDURE — 85025 COMPLETE CBC W/AUTO DIFF WBC: CPT | Performed by: FAMILY MEDICINE

## 2021-11-23 PROCEDURE — 87205 SMEAR GRAM STAIN: CPT | Performed by: NURSE PRACTITIONER

## 2021-11-23 PROCEDURE — 93005 ELECTROCARDIOGRAM TRACING: CPT | Performed by: INTERNAL MEDICINE

## 2021-11-23 PROCEDURE — 0W9930Z DRAINAGE OF RIGHT PLEURAL CAVITY WITH DRAINAGE DEVICE, PERCUTANEOUS APPROACH: ICD-10-PCS | Performed by: SURGERY

## 2021-11-23 PROCEDURE — 89051 BODY FLUID CELL COUNT: CPT | Performed by: NURSE PRACTITIONER

## 2021-11-23 PROCEDURE — 82945 GLUCOSE OTHER FLUID: CPT | Performed by: NURSE PRACTITIONER

## 2021-11-23 PROCEDURE — 99222 1ST HOSP IP/OBS MODERATE 55: CPT | Performed by: SURGERY

## 2021-11-23 PROCEDURE — 25010000002 FUROSEMIDE PER 20 MG: Performed by: STUDENT IN AN ORGANIZED HEALTH CARE EDUCATION/TRAINING PROGRAM

## 2021-11-23 PROCEDURE — 75989 ABSCESS DRAINAGE UNDER X-RAY: CPT | Performed by: SURGERY

## 2021-11-23 PROCEDURE — 0 LIDOCAINE 1 % SOLUTION: Performed by: SURGERY

## 2021-11-23 PROCEDURE — 32550 INSERT PLEURAL CATH: CPT | Performed by: SURGERY

## 2021-11-23 PROCEDURE — 83605 ASSAY OF LACTIC ACID: CPT | Performed by: STUDENT IN AN ORGANIZED HEALTH CARE EDUCATION/TRAINING PROGRAM

## 2021-11-23 PROCEDURE — 82150 ASSAY OF AMYLASE: CPT | Performed by: NURSE PRACTITIONER

## 2021-11-23 PROCEDURE — 83615 LACTATE (LD) (LDH) ENZYME: CPT | Performed by: NURSE PRACTITIONER

## 2021-11-23 PROCEDURE — 84157 ASSAY OF PROTEIN OTHER: CPT | Performed by: NURSE PRACTITIONER

## 2021-11-23 PROCEDURE — 25010000002 FUROSEMIDE PER 20 MG: Performed by: INTERNAL MEDICINE

## 2021-11-23 PROCEDURE — 80048 BASIC METABOLIC PNL TOTAL CA: CPT | Performed by: FAMILY MEDICINE

## 2021-11-23 PROCEDURE — 88112 CYTOPATH CELL ENHANCE TECH: CPT | Performed by: NURSE PRACTITIONER

## 2021-11-23 RX ORDER — OXYCODONE HYDROCHLORIDE 5 MG/1
15 TABLET ORAL 4 TIMES DAILY PRN
Status: DISCONTINUED | OUTPATIENT
Start: 2021-11-23 | End: 2021-11-25 | Stop reason: HOSPADM

## 2021-11-23 RX ORDER — SODIUM CHLORIDE 0.9 % (FLUSH) 0.9 %
10 SYRINGE (ML) INJECTION AS NEEDED
Status: DISCONTINUED | OUTPATIENT
Start: 2021-11-23 | End: 2021-11-25 | Stop reason: HOSPADM

## 2021-11-23 RX ORDER — HYDROCODONE BITARTRATE AND ACETAMINOPHEN 7.5; 325 MG/1; MG/1
1 TABLET ORAL EVERY 4 HOURS PRN
Status: DISCONTINUED | OUTPATIENT
Start: 2021-11-23 | End: 2021-11-25 | Stop reason: HOSPADM

## 2021-11-23 RX ORDER — FUROSEMIDE 10 MG/ML
40 INJECTION INTRAMUSCULAR; INTRAVENOUS EVERY 12 HOURS
Status: DISCONTINUED | OUTPATIENT
Start: 2021-11-23 | End: 2021-11-25 | Stop reason: HOSPADM

## 2021-11-23 RX ORDER — SODIUM CHLORIDE 0.9 % (FLUSH) 0.9 %
10 SYRINGE (ML) INJECTION EVERY 12 HOURS SCHEDULED
Status: DISCONTINUED | OUTPATIENT
Start: 2021-11-23 | End: 2021-11-25 | Stop reason: HOSPADM

## 2021-11-23 RX ORDER — ZOLPIDEM TARTRATE 5 MG/1
5 TABLET ORAL NIGHTLY PRN
Status: DISCONTINUED | OUTPATIENT
Start: 2021-11-23 | End: 2021-11-25 | Stop reason: HOSPADM

## 2021-11-23 RX ORDER — ESCITALOPRAM OXALATE 10 MG/1
10 TABLET ORAL DAILY
Status: DISCONTINUED | OUTPATIENT
Start: 2021-11-23 | End: 2021-11-25 | Stop reason: HOSPADM

## 2021-11-23 RX ORDER — MELOXICAM 7.5 MG/1
7.5 TABLET ORAL DAILY
COMMUNITY
End: 2021-11-25 | Stop reason: HOSPADM

## 2021-11-23 RX ORDER — CARVEDILOL 6.25 MG/1
12.5 TABLET ORAL DAILY
Status: DISCONTINUED | OUTPATIENT
Start: 2021-11-23 | End: 2021-11-25 | Stop reason: HOSPADM

## 2021-11-23 RX ORDER — LIDOCAINE HYDROCHLORIDE 10 MG/ML
10 INJECTION, SOLUTION INFILTRATION; PERINEURAL ONCE AS NEEDED
Status: COMPLETED | OUTPATIENT
Start: 2021-11-23 | End: 2021-11-23

## 2021-11-23 RX ORDER — FUROSEMIDE 10 MG/ML
80 INJECTION INTRAMUSCULAR; INTRAVENOUS ONCE
Status: COMPLETED | OUTPATIENT
Start: 2021-11-23 | End: 2021-11-23

## 2021-11-23 RX ORDER — ONDANSETRON 2 MG/ML
4 INJECTION INTRAMUSCULAR; INTRAVENOUS EVERY 6 HOURS PRN
Status: DISCONTINUED | OUTPATIENT
Start: 2021-11-23 | End: 2021-11-25 | Stop reason: HOSPADM

## 2021-11-23 RX ORDER — ACETAMINOPHEN 325 MG/1
650 TABLET ORAL EVERY 4 HOURS PRN
Status: DISCONTINUED | OUTPATIENT
Start: 2021-11-23 | End: 2021-11-25 | Stop reason: HOSPADM

## 2021-11-23 RX ADMIN — SODIUM CHLORIDE, PRESERVATIVE FREE 10 ML: 5 INJECTION INTRAVENOUS at 20:28

## 2021-11-23 RX ADMIN — FUROSEMIDE 40 MG: 10 INJECTION INTRAMUSCULAR; INTRAVENOUS at 09:43

## 2021-11-23 RX ADMIN — LIDOCAINE HYDROCHLORIDE 10 ML: 10 INJECTION, SOLUTION INFILTRATION; PERINEURAL at 13:20

## 2021-11-23 RX ADMIN — SODIUM CHLORIDE, PRESERVATIVE FREE 10 ML: 5 INJECTION INTRAVENOUS at 09:37

## 2021-11-23 RX ADMIN — CARVEDILOL 12.5 MG: 6.25 TABLET, FILM COATED ORAL at 09:37

## 2021-11-23 RX ADMIN — FUROSEMIDE 80 MG: 10 INJECTION, SOLUTION INTRAVENOUS at 03:10

## 2021-11-23 RX ADMIN — OXYCODONE HYDROCHLORIDE 15 MG: 5 TABLET ORAL at 20:28

## 2021-11-23 RX ADMIN — FUROSEMIDE 40 MG: 10 INJECTION INTRAMUSCULAR; INTRAVENOUS at 23:04

## 2021-11-23 RX ADMIN — OXYCODONE HYDROCHLORIDE 15 MG: 5 TABLET ORAL at 14:05

## 2021-11-23 RX ADMIN — ZOLPIDEM TARTRATE 5 MG: 5 TABLET ORAL at 21:46

## 2021-11-23 RX ADMIN — OXYCODONE HYDROCHLORIDE 15 MG: 5 TABLET ORAL at 07:43

## 2021-11-24 ENCOUNTER — APPOINTMENT (OUTPATIENT)
Dept: CARDIOLOGY | Facility: HOSPITAL | Age: 76
End: 2021-11-24

## 2021-11-24 ENCOUNTER — APPOINTMENT (OUTPATIENT)
Dept: GENERAL RADIOLOGY | Facility: HOSPITAL | Age: 76
End: 2021-11-24

## 2021-11-24 LAB
ANION GAP SERPL CALCULATED.3IONS-SCNC: 9 MMOL/L (ref 5–15)
BASOPHILS # BLD AUTO: 0.01 10*3/MM3 (ref 0–0.2)
BASOPHILS NFR BLD AUTO: 0.1 % (ref 0–1.5)
BH CV ECHO MEAS - AO MAX PG (FULL): 9.4 MMHG
BH CV ECHO MEAS - AO MAX PG: 10.5 MMHG
BH CV ECHO MEAS - AO MEAN PG (FULL): 4 MMHG
BH CV ECHO MEAS - AO MEAN PG: 5 MMHG
BH CV ECHO MEAS - AO ROOT AREA (BSA CORRECTED): 1.9
BH CV ECHO MEAS - AO ROOT AREA: 6.6 CM^2
BH CV ECHO MEAS - AO ROOT DIAM: 2.9 CM
BH CV ECHO MEAS - AO V2 MAX: 162 CM/SEC
BH CV ECHO MEAS - AO V2 MEAN: 107 CM/SEC
BH CV ECHO MEAS - AO V2 VTI: 24 CM
BH CV ECHO MEAS - AVA(I,A): 1.2 CM^2
BH CV ECHO MEAS - AVA(I,D): 1.2 CM^2
BH CV ECHO MEAS - AVA(V,A): 1 CM^2
BH CV ECHO MEAS - AVA(V,D): 1 CM^2
BH CV ECHO MEAS - BSA(HAYCOCK): 1.5 M^2
BH CV ECHO MEAS - BSA: 1.5 M^2
BH CV ECHO MEAS - BZI_BMI: 20.3 KILOGRAMS/M^2
BH CV ECHO MEAS - BZI_METRIC_HEIGHT: 157.5 CM
BH CV ECHO MEAS - BZI_METRIC_WEIGHT: 50.3 KG
BH CV ECHO MEAS - EDV(CUBED): 119.8 ML
BH CV ECHO MEAS - EDV(TEICH): 114.4 ML
BH CV ECHO MEAS - EF(CUBED): 26 %
BH CV ECHO MEAS - EF(TEICH): 20.9 %
BH CV ECHO MEAS - ESV(CUBED): 88.7 ML
BH CV ECHO MEAS - ESV(TEICH): 90.5 ML
BH CV ECHO MEAS - FS: 9.5 %
BH CV ECHO MEAS - IVS/LVPW: 0.81
BH CV ECHO MEAS - IVSD: 0.73 CM
BH CV ECHO MEAS - LA DIMENSION: 3.8 CM
BH CV ECHO MEAS - LA/AO: 1.3
BH CV ECHO MEAS - LV MASS(C)D: 135.4 GRAMS
BH CV ECHO MEAS - LV MASS(C)DI: 90.9 GRAMS/M^2
BH CV ECHO MEAS - LV MAX PG: 1.1 MMHG
BH CV ECHO MEAS - LV MEAN PG: 1 MMHG
BH CV ECHO MEAS - LV V1 MAX: 52.7 CM/SEC
BH CV ECHO MEAS - LV V1 MEAN: 35.7 CM/SEC
BH CV ECHO MEAS - LV V1 VTI: 9.5 CM
BH CV ECHO MEAS - LVIDD: 4.9 CM
BH CV ECHO MEAS - LVIDS: 4.5 CM
BH CV ECHO MEAS - LVOT AREA (M): 3.1 CM^2
BH CV ECHO MEAS - LVOT AREA: 3.1 CM^2
BH CV ECHO MEAS - LVOT DIAM: 2 CM
BH CV ECHO MEAS - LVPWD: 0.9 CM
BH CV ECHO MEAS - MR MAX PG: 55.7 MMHG
BH CV ECHO MEAS - MR MAX VEL: 373 CM/SEC
BH CV ECHO MEAS - MV A MAX VEL: 36.9 CM/SEC
BH CV ECHO MEAS - MV DEC SLOPE: 657.5 CM/SEC^2
BH CV ECHO MEAS - MV DEC TIME: 0.14 SEC
BH CV ECHO MEAS - MV E MAX VEL: 109 CM/SEC
BH CV ECHO MEAS - MV E/A: 3
BH CV ECHO MEAS - MV P1/2T MAX VEL: 121 CM/SEC
BH CV ECHO MEAS - MV P1/2T: 53.9 MSEC
BH CV ECHO MEAS - MVA P1/2T LCG: 1.8 CM^2
BH CV ECHO MEAS - MVA(P1/2T): 4.1 CM^2
BH CV ECHO MEAS - PA MAX PG: 3.1 MMHG
BH CV ECHO MEAS - PA V2 MAX: 88.2 CM/SEC
BH CV ECHO MEAS - PI END-D VEL: 137 CM/SEC
BH CV ECHO MEAS - RAP SYSTOLE: 5 MMHG
BH CV ECHO MEAS - RVSP: 41.7 MMHG
BH CV ECHO MEAS - SI(AO): 106.5 ML/M^2
BH CV ECHO MEAS - SI(CUBED): 20.9 ML/M^2
BH CV ECHO MEAS - SI(LVOT): 20.1 ML/M^2
BH CV ECHO MEAS - SI(TEICH): 16.1 ML/M^2
BH CV ECHO MEAS - SV(AO): 158.5 ML
BH CV ECHO MEAS - SV(CUBED): 31.1 ML
BH CV ECHO MEAS - SV(LVOT): 30 ML
BH CV ECHO MEAS - SV(TEICH): 23.9 ML
BH CV ECHO MEAS - TR MAX VEL: 303 CM/SEC
BUN SERPL-MCNC: 24 MG/DL (ref 8–23)
BUN/CREAT SERPL: 35.8 (ref 7–25)
CALCIUM SPEC-SCNC: 8.5 MG/DL (ref 8.6–10.5)
CHLORIDE SERPL-SCNC: 103 MMOL/L (ref 98–107)
CO2 SERPL-SCNC: 28 MMOL/L (ref 22–29)
CREAT SERPL-MCNC: 0.67 MG/DL (ref 0.57–1)
CYTO UR: NORMAL
DEPRECATED RDW RBC AUTO: 52.3 FL (ref 37–54)
EOSINOPHIL # BLD AUTO: 0.15 10*3/MM3 (ref 0–0.4)
EOSINOPHIL NFR BLD AUTO: 1.6 % (ref 0.3–6.2)
ERYTHROCYTE [DISTWIDTH] IN BLOOD BY AUTOMATED COUNT: 15 % (ref 12.3–15.4)
GFR SERPL CREATININE-BSD FRML MDRD: 86 ML/MIN/1.73
GLUCOSE SERPL-MCNC: 158 MG/DL (ref 65–99)
HCT VFR BLD AUTO: 38.7 % (ref 34–46.6)
HGB BLD-MCNC: 12 G/DL (ref 12–15.9)
IMM GRANULOCYTES # BLD AUTO: 0.03 10*3/MM3 (ref 0–0.05)
IMM GRANULOCYTES NFR BLD AUTO: 0.3 % (ref 0–0.5)
LAB AP CASE REPORT: NORMAL
LEFT ATRIUM VOLUME INDEX: 37.2 ML/M2
LYMPHOCYTES # BLD AUTO: 0.77 10*3/MM3 (ref 0.7–3.1)
LYMPHOCYTES NFR BLD AUTO: 8.2 % (ref 19.6–45.3)
MAXIMAL PREDICTED HEART RATE: 144 BPM
MCH RBC QN AUTO: 30.4 PG (ref 26.6–33)
MCHC RBC AUTO-ENTMCNC: 31 G/DL (ref 31.5–35.7)
MCV RBC AUTO: 98 FL (ref 79–97)
MONOCYTES # BLD AUTO: 0.61 10*3/MM3 (ref 0.1–0.9)
MONOCYTES NFR BLD AUTO: 6.5 % (ref 5–12)
NEUTROPHILS NFR BLD AUTO: 7.79 10*3/MM3 (ref 1.7–7)
NEUTROPHILS NFR BLD AUTO: 83.3 % (ref 42.7–76)
NRBC BLD AUTO-RTO: 0 /100 WBC (ref 0–0.2)
NT-PROBNP SERPL-MCNC: 9842 PG/ML (ref 0–1800)
PATH REPORT.FINAL DX SPEC: NORMAL
PATH REPORT.GROSS SPEC: NORMAL
PLATELET # BLD AUTO: 264 10*3/MM3 (ref 140–450)
PMV BLD AUTO: 10.1 FL (ref 6–12)
POTASSIUM SERPL-SCNC: 3.3 MMOL/L (ref 3.5–5.2)
RBC # BLD AUTO: 3.95 10*6/MM3 (ref 3.77–5.28)
SODIUM SERPL-SCNC: 140 MMOL/L (ref 136–145)
STRESS TARGET HR: 122 BPM
WBC NRBC COR # BLD: 9.36 10*3/MM3 (ref 3.4–10.8)

## 2021-11-24 PROCEDURE — 93356 MYOCRD STRAIN IMG SPCKL TRCK: CPT | Performed by: INTERNAL MEDICINE

## 2021-11-24 PROCEDURE — 93356 MYOCRD STRAIN IMG SPCKL TRCK: CPT

## 2021-11-24 PROCEDURE — 93306 TTE W/DOPPLER COMPLETE: CPT | Performed by: INTERNAL MEDICINE

## 2021-11-24 PROCEDURE — 99231 SBSQ HOSP IP/OBS SF/LOW 25: CPT | Performed by: NURSE PRACTITIONER

## 2021-11-24 PROCEDURE — 93306 TTE W/DOPPLER COMPLETE: CPT

## 2021-11-24 PROCEDURE — 83880 ASSAY OF NATRIURETIC PEPTIDE: CPT | Performed by: INTERNAL MEDICINE

## 2021-11-24 PROCEDURE — 85025 COMPLETE CBC W/AUTO DIFF WBC: CPT | Performed by: FAMILY MEDICINE

## 2021-11-24 PROCEDURE — 71045 X-RAY EXAM CHEST 1 VIEW: CPT

## 2021-11-24 PROCEDURE — 80048 BASIC METABOLIC PNL TOTAL CA: CPT | Performed by: FAMILY MEDICINE

## 2021-11-24 PROCEDURE — 25010000002 FUROSEMIDE PER 20 MG: Performed by: INTERNAL MEDICINE

## 2021-11-24 RX ORDER — POTASSIUM CHLORIDE 750 MG/1
40 CAPSULE, EXTENDED RELEASE ORAL ONCE
Status: COMPLETED | OUTPATIENT
Start: 2021-11-24 | End: 2021-11-24

## 2021-11-24 RX ORDER — LIDOCAINE 50 MG/G
2 PATCH TOPICAL
Status: DISCONTINUED | OUTPATIENT
Start: 2021-11-24 | End: 2021-11-25 | Stop reason: HOSPADM

## 2021-11-24 RX ADMIN — SODIUM CHLORIDE, PRESERVATIVE FREE 10 ML: 5 INJECTION INTRAVENOUS at 09:36

## 2021-11-24 RX ADMIN — ESCITALOPRAM 10 MG: 10 TABLET, FILM COATED ORAL at 09:35

## 2021-11-24 RX ADMIN — POTASSIUM CHLORIDE 40 MEQ: 10 CAPSULE, COATED, EXTENDED RELEASE ORAL at 11:11

## 2021-11-24 RX ADMIN — OXYCODONE HYDROCHLORIDE 15 MG: 5 TABLET ORAL at 07:53

## 2021-11-24 RX ADMIN — LIDOCAINE 2 PATCH: 50 PATCH CUTANEOUS at 09:35

## 2021-11-24 RX ADMIN — SODIUM CHLORIDE, PRESERVATIVE FREE 10 ML: 5 INJECTION INTRAVENOUS at 21:31

## 2021-11-24 RX ADMIN — OXYCODONE HYDROCHLORIDE 15 MG: 5 TABLET ORAL at 22:43

## 2021-11-24 RX ADMIN — OXYCODONE HYDROCHLORIDE 15 MG: 5 TABLET ORAL at 13:36

## 2021-11-24 RX ADMIN — FUROSEMIDE 40 MG: 10 INJECTION INTRAMUSCULAR; INTRAVENOUS at 21:31

## 2021-11-24 RX ADMIN — CARVEDILOL 12.5 MG: 6.25 TABLET, FILM COATED ORAL at 09:34

## 2021-11-24 RX ADMIN — FUROSEMIDE 40 MG: 10 INJECTION INTRAMUSCULAR; INTRAVENOUS at 09:35

## 2021-11-25 ENCOUNTER — APPOINTMENT (OUTPATIENT)
Dept: GENERAL RADIOLOGY | Facility: HOSPITAL | Age: 76
End: 2021-11-25

## 2021-11-25 ENCOUNTER — READMISSION MANAGEMENT (OUTPATIENT)
Dept: CALL CENTER | Facility: HOSPITAL | Age: 76
End: 2021-11-25

## 2021-11-25 VITALS
BODY MASS INDEX: 20.43 KG/M2 | OXYGEN SATURATION: 93 % | TEMPERATURE: 98.6 F | DIASTOLIC BLOOD PRESSURE: 45 MMHG | HEIGHT: 62 IN | RESPIRATION RATE: 16 BRPM | WEIGHT: 111 LBS | SYSTOLIC BLOOD PRESSURE: 105 MMHG | HEART RATE: 84 BPM

## 2021-11-25 PROBLEM — I48.91 LONE ATRIAL FIBRILLATION: Status: RESOLVED | Noted: 2021-11-23 | Resolved: 2021-11-25

## 2021-11-25 PROBLEM — R06.02 SOB (SHORTNESS OF BREATH): Status: ACTIVE | Noted: 2021-11-25

## 2021-11-25 LAB
ANION GAP SERPL CALCULATED.3IONS-SCNC: 11 MMOL/L (ref 5–15)
BASOPHILS # BLD AUTO: 0.03 10*3/MM3 (ref 0–0.2)
BASOPHILS NFR BLD AUTO: 0.4 % (ref 0–1.5)
BUN SERPL-MCNC: 23 MG/DL (ref 8–23)
BUN/CREAT SERPL: 39 (ref 7–25)
CALCIUM SPEC-SCNC: 8.1 MG/DL (ref 8.6–10.5)
CHLORIDE SERPL-SCNC: 101 MMOL/L (ref 98–107)
CHOLEST SERPL-MCNC: 118 MG/DL (ref 0–200)
CO2 SERPL-SCNC: 29 MMOL/L (ref 22–29)
CREAT SERPL-MCNC: 0.59 MG/DL (ref 0.57–1)
DEPRECATED RDW RBC AUTO: 52.8 FL (ref 37–54)
EOSINOPHIL # BLD AUTO: 0.27 10*3/MM3 (ref 0–0.4)
EOSINOPHIL NFR BLD AUTO: 3.2 % (ref 0.3–6.2)
ERYTHROCYTE [DISTWIDTH] IN BLOOD BY AUTOMATED COUNT: 14.8 % (ref 12.3–15.4)
GFR SERPL CREATININE-BSD FRML MDRD: 99 ML/MIN/1.73
GLUCOSE SERPL-MCNC: 99 MG/DL (ref 65–99)
HCT VFR BLD AUTO: 37.8 % (ref 34–46.6)
HDLC SERPL-MCNC: 28 MG/DL (ref 40–60)
HGB BLD-MCNC: 12.1 G/DL (ref 12–15.9)
IMM GRANULOCYTES # BLD AUTO: 0.04 10*3/MM3 (ref 0–0.05)
IMM GRANULOCYTES NFR BLD AUTO: 0.5 % (ref 0–0.5)
LDLC SERPL CALC-MCNC: 68 MG/DL (ref 0–100)
LDLC/HDLC SERPL: 2.38 {RATIO}
LYMPHOCYTES # BLD AUTO: 1.24 10*3/MM3 (ref 0.7–3.1)
LYMPHOCYTES NFR BLD AUTO: 14.8 % (ref 19.6–45.3)
MCH RBC QN AUTO: 31.3 PG (ref 26.6–33)
MCHC RBC AUTO-ENTMCNC: 32 G/DL (ref 31.5–35.7)
MCV RBC AUTO: 97.7 FL (ref 79–97)
MONOCYTES # BLD AUTO: 0.74 10*3/MM3 (ref 0.1–0.9)
MONOCYTES NFR BLD AUTO: 8.8 % (ref 5–12)
NEUTROPHILS NFR BLD AUTO: 6.08 10*3/MM3 (ref 1.7–7)
NEUTROPHILS NFR BLD AUTO: 72.3 % (ref 42.7–76)
NRBC BLD AUTO-RTO: 0 /100 WBC (ref 0–0.2)
PLATELET # BLD AUTO: 276 10*3/MM3 (ref 140–450)
PMV BLD AUTO: 9.8 FL (ref 6–12)
POTASSIUM SERPL-SCNC: 3.4 MMOL/L (ref 3.5–5.2)
RBC # BLD AUTO: 3.87 10*6/MM3 (ref 3.77–5.28)
SODIUM SERPL-SCNC: 141 MMOL/L (ref 136–145)
TRIGL SERPL-MCNC: 117 MG/DL (ref 0–150)
VLDLC SERPL-MCNC: 22 MG/DL (ref 5–40)
WBC NRBC COR # BLD: 8.4 10*3/MM3 (ref 3.4–10.8)

## 2021-11-25 PROCEDURE — 85025 COMPLETE CBC W/AUTO DIFF WBC: CPT | Performed by: FAMILY MEDICINE

## 2021-11-25 PROCEDURE — 80061 LIPID PANEL: CPT | Performed by: INTERNAL MEDICINE

## 2021-11-25 PROCEDURE — 71046 X-RAY EXAM CHEST 2 VIEWS: CPT

## 2021-11-25 PROCEDURE — 94618 PULMONARY STRESS TESTING: CPT

## 2021-11-25 PROCEDURE — 99231 SBSQ HOSP IP/OBS SF/LOW 25: CPT | Performed by: SURGERY

## 2021-11-25 PROCEDURE — 71045 X-RAY EXAM CHEST 1 VIEW: CPT

## 2021-11-25 PROCEDURE — 80048 BASIC METABOLIC PNL TOTAL CA: CPT | Performed by: FAMILY MEDICINE

## 2021-11-25 PROCEDURE — 25010000002 FUROSEMIDE PER 20 MG: Performed by: INTERNAL MEDICINE

## 2021-11-25 RX ORDER — ATORVASTATIN CALCIUM 10 MG/1
10 TABLET, FILM COATED ORAL DAILY
Qty: 30 TABLET | Refills: 0 | Status: ON HOLD | OUTPATIENT
Start: 2021-11-25 | End: 2022-03-02

## 2021-11-25 RX ORDER — POTASSIUM CHLORIDE 750 MG/1
10 TABLET, FILM COATED, EXTENDED RELEASE ORAL DAILY
Qty: 7 TABLET | Refills: 0 | Status: SHIPPED | OUTPATIENT
Start: 2021-11-25

## 2021-11-25 RX ORDER — ASPIRIN 81 MG/1
81 TABLET ORAL DAILY
Qty: 30 TABLET | Refills: 0 | Status: SHIPPED | OUTPATIENT
Start: 2021-11-25 | End: 2023-01-10

## 2021-11-25 RX ORDER — LIDOCAINE 50 MG/G
2 PATCH TOPICAL
Qty: 30 PATCH | Refills: 0 | Status: SHIPPED | OUTPATIENT
Start: 2021-11-26

## 2021-11-25 RX ORDER — FUROSEMIDE 40 MG/1
40 TABLET ORAL DAILY
Qty: 15 TABLET | Refills: 0 | Status: SHIPPED | OUTPATIENT
Start: 2021-11-25 | End: 2022-01-31

## 2021-11-25 RX ORDER — POTASSIUM CHLORIDE 750 MG/1
40 CAPSULE, EXTENDED RELEASE ORAL 2 TIMES DAILY WITH MEALS
Status: DISCONTINUED | OUTPATIENT
Start: 2021-11-25 | End: 2021-11-25 | Stop reason: HOSPADM

## 2021-11-25 RX ADMIN — CARVEDILOL 12.5 MG: 6.25 TABLET, FILM COATED ORAL at 08:00

## 2021-11-25 RX ADMIN — OXYCODONE HYDROCHLORIDE 15 MG: 5 TABLET ORAL at 06:09

## 2021-11-25 RX ADMIN — SODIUM CHLORIDE, PRESERVATIVE FREE 10 ML: 5 INJECTION INTRAVENOUS at 08:01

## 2021-11-25 RX ADMIN — LIDOCAINE 2 PATCH: 50 PATCH CUTANEOUS at 08:02

## 2021-11-25 RX ADMIN — ESCITALOPRAM 10 MG: 10 TABLET, FILM COATED ORAL at 08:00

## 2021-11-25 RX ADMIN — HYDROCODONE BITARTRATE AND ACETAMINOPHEN 1 TABLET: 7.5; 325 TABLET ORAL at 13:04

## 2021-11-25 RX ADMIN — FUROSEMIDE 40 MG: 10 INJECTION INTRAMUSCULAR; INTRAVENOUS at 11:24

## 2021-11-25 NOTE — OUTREACH NOTE
Prep Survey      Responses   [facility] patient discharged from? [place]   Is LACE score < 7 ? No   Emergency Room discharge w/ pulse ox? No   Eligibility [facility]   Date of Admission 11/22/21   Date of Discharge 11/25/21   Discharge Disposition Home or Self Care   Discharge diagnosis Pleural effusion   Does the patient have one of the following disease processes/diagnoses(primary or secondary)? Other   Does the patient have Home health ordered? No   Is there a DME ordered? No   Medication alerts for this patient Meds to Beds   Prep survey completed? Yes          Julieta King, RN

## 2021-11-26 LAB
BACTERIA FLD CULT: NORMAL
GRAM STN SPEC: NORMAL
GRAM STN SPEC: NORMAL
LIPASE FLD-CCNC: <3 U/L

## 2021-11-29 ENCOUNTER — TRANSITIONAL CARE MANAGEMENT TELEPHONE ENCOUNTER (OUTPATIENT)
Dept: CALL CENTER | Facility: HOSPITAL | Age: 76
End: 2021-11-29

## 2021-11-29 LAB
QT INTERVAL: 332 MS
QT INTERVAL: 372 MS
QT INTERVAL: 414 MS
QTC INTERVAL: 469 MS
QTC INTERVAL: 506 MS
QTC INTERVAL: 521 MS

## 2021-11-29 NOTE — OUTREACH NOTE
Call Center TCM Note      Responses   [facility] patient discharged from? [place]   Does the patient have one of the following disease processes/diagnoses(primary or secondary)? Other   TCM attempt successful? No   Unsuccessful attempts Attempt 2          Jackie Wise LPN    11/29/2021, 15:54 EST

## 2021-11-29 NOTE — OUTREACH NOTE
Call Center TCM Note      Responses   [facility] patient discharged from? [place]   Does the patient have one of the following disease processes/diagnoses(primary or secondary)? Other   TCM attempt successful? No   Unsuccessful attempts Attempt 1          Jackie Wise LPN    11/29/2021, 12:26 EST

## 2021-11-30 ENCOUNTER — TRANSITIONAL CARE MANAGEMENT TELEPHONE ENCOUNTER (OUTPATIENT)
Dept: CALL CENTER | Facility: HOSPITAL | Age: 76
End: 2021-11-30

## 2021-11-30 NOTE — OUTREACH NOTE
Call Center TCM Note      Responses   [facility] [place] patient discharged from? [place]   Does the patient have one of the following disease processes/diagnoses(primary or secondary)? Other   TCM attempt successful? No   Unsuccessful attempts Attempt 3          Georgina Saunders RN    11/30/2021, 11:24 EST

## 2021-12-03 ENCOUNTER — READMISSION MANAGEMENT (OUTPATIENT)
Dept: CALL CENTER | Facility: HOSPITAL | Age: 76
End: 2021-12-03

## 2021-12-03 ENCOUNTER — OFFICE VISIT (OUTPATIENT)
Dept: INTERNAL MEDICINE | Facility: CLINIC | Age: 76
End: 2021-12-03

## 2021-12-03 VITALS
BODY MASS INDEX: 19.43 KG/M2 | WEIGHT: 105.6 LBS | HEART RATE: 109 BPM | DIASTOLIC BLOOD PRESSURE: 80 MMHG | SYSTOLIC BLOOD PRESSURE: 124 MMHG | OXYGEN SATURATION: 97 % | TEMPERATURE: 97.5 F | HEIGHT: 62 IN

## 2021-12-03 DIAGNOSIS — J90 PLEURAL EFFUSION: ICD-10-CM

## 2021-12-03 DIAGNOSIS — I42.9 CARDIOMYOPATHY, UNSPECIFIED TYPE (HCC): ICD-10-CM

## 2021-12-03 DIAGNOSIS — Z09 HOSPITAL DISCHARGE FOLLOW-UP: Primary | ICD-10-CM

## 2021-12-03 DIAGNOSIS — I10 HYPERTENSION, BENIGN: ICD-10-CM

## 2021-12-03 PROCEDURE — 99495 TRANSJ CARE MGMT MOD F2F 14D: CPT | Performed by: NURSE PRACTITIONER

## 2021-12-03 PROCEDURE — 1111F DSCHRG MED/CURRENT MED MERGE: CPT | Performed by: NURSE PRACTITIONER

## 2021-12-03 RX ORDER — MELOXICAM 7.5 MG/1
7.5 TABLET ORAL DAILY
COMMUNITY
End: 2022-01-04

## 2021-12-03 RX ORDER — LISINOPRIL 5 MG/1
5 TABLET ORAL DAILY
Qty: 90 TABLET | Refills: 3 | Status: SHIPPED | OUTPATIENT
Start: 2021-12-03 | End: 2021-12-14

## 2021-12-03 NOTE — OUTREACH NOTE
Medical Week 2 Survey      Responses   [facility] patient discharged from? [place]   Does the patient have one of the following disease processes/diagnoses(primary or secondary)? Other   Week 2 attempt successful? No   Unsuccessful attempts Attempt 1          Jackie Wise LPN

## 2021-12-03 NOTE — PROGRESS NOTES
Subjective   Mitzi Villafuerte is a 76 y.o. female.   Chief Complaint   Patient presents with   • Transitional Care Management     Pneumonia, started feeling better 2 days ago       Mitzi presents today for hospital follow up for pleural effusion.  She was initially seen at an urgent care facility and treated for pneumonia with 2 rounds of antibiotics without improvement.  With worsening shortness of breath and weakness she went to the hospital for further evaluation.  She was found to have pleural effusion and ejection fraction of 31-35%.  She was diuresed and started to have improvement.  A pigtail catheter was also placed for pleural effusion.  She denies fever since discharge.  Breathing is stable.         The following portions of the patient's history were reviewed and updated as appropriate: allergies, current medications, past family history, past medical history, past social history, past surgical history and problem list.    Review of Systems   Constitutional: Negative for activity change, appetite change, fatigue, fever, unexpected weight gain and unexpected weight loss.   HENT: Negative for swollen glands, trouble swallowing and voice change.    Eyes: Negative for blurred vision and visual disturbance.   Respiratory: Negative for cough and shortness of breath.    Cardiovascular: Negative for chest pain, palpitations and leg swelling.   Gastrointestinal: Negative for abdominal pain, constipation, diarrhea, nausea, vomiting and indigestion.   Endocrine: Negative for cold intolerance, heat intolerance, polydipsia and polyphagia.   Genitourinary: Negative for dysuria and frequency.   Musculoskeletal: Negative for arthralgias, back pain, joint swelling and neck pain.   Skin: Negative for color change, rash and skin lesions.   Neurological: Negative for dizziness, weakness, headache, memory problem and confusion.   Hematological: Does not bruise/bleed easily.   Psychiatric/Behavioral: Negative for agitation,  hallucinations and suicidal ideas. The patient is not nervous/anxious.        Objective   Past Medical History:   Diagnosis Date   • Fracture of hip (HCC)     left, due to lymphoma, treated non-operatively   • Hypertension    • Lymphoma (HCC)       Past Surgical History:   Procedure Laterality Date   • CHOLECYSTECTOMY     • HERNIA REPAIR     • HIP TROCHANTERIC NAILING WITH INTRAMEDULLARY HIP SCREW Left 2/2/2017    Procedure: HIP TROCANTERIC NAILING LONG WITH INTRAMEDULLARY HIP SCREW WITH CAST APPLICATION TO LEFT HAND;  Surgeon: Beni Culp MD;  Location: [facility];  Service:         Current Outpatient Medications:   •  alendronate (Fosamax) 70 MG tablet, Take 1 tablet by mouth Every 7 (Seven) Days., Disp: 12 tablet, Rfl: 3  •  aspirin (aspirin) 81 MG EC tablet, Take 1 tablet by mouth Daily., Disp: 30 tablet, Rfl: 0  •  atorvastatin (Lipitor) 10 MG tablet, Take 1 tablet by mouth Daily., Disp: 30 tablet, Rfl: 0  •  carvedilol (Coreg) 12.5 MG tablet, Take 1 tablet by mouth Daily., Disp: , Rfl:   •  escitalopram (LEXAPRO) 10 MG tablet, TAKE 1 TABLET BY MOUTH EVERY DAY, Disp: 90 tablet, Rfl: 3  •  furosemide (Lasix) 40 MG tablet, Take 1 tablet by mouth Daily for 15 days., Disp: 15 tablet, Rfl: 0  •  lidocaine (LIDODERM) 5 %, Place 2 patches on the skin as directed by provider Daily. Remove & Discard patch within 12 hours or as directed by MD, Disp: 30 patch, Rfl: 0  •  meloxicam (MOBIC) 7.5 MG tablet, Take 7.5 mg by mouth Daily., Disp: , Rfl:   •  oxyCODONE (ROXICODONE) 15 MG immediate release tablet, Take 1 mg by mouth 4 (Four) Times a Day As Needed., Disp: , Rfl:   •  potassium chloride 10 MEQ CR tablet, Take 1 tablet by mouth Daily., Disp: 7 tablet, Rfl: 0  •  vitamin B-6 (PYRIDOXINE) 50 MG tablet, Take 50 mg by mouth Daily., Disp: , Rfl:   •  vitamin D (ERGOCALCIFEROL) 1.25 MG (49954 UT) capsule capsule, Take 1 capsule by mouth Every 30 (Thirty) Days., Disp: 3 capsule, Rfl: 3  •  zolpidem (AMBIEN) 10 MG  tablet, Take 0.5 tablets by mouth At Night As Needed for Sleep., Disp: 15 tablet, Rfl: 5  •  lisinopril (PRINIVIL,ZESTRIL) 5 MG tablet, Take 1 tablet by mouth Daily., Disp: 90 tablet, Rfl: 3      Vitals:    12/03/21 1412   BP: 124/80   Pulse: 109   Temp: 97.5 °F (36.4 °C)   SpO2: 97%         12/03/21  1412   Weight: 47.9 kg (105 lb 9.6 oz)       Body mass index is 19.31 kg/m².    Physical Exam  HENT:      Right Ear: Tympanic membrane and external ear normal.      Left Ear: Tympanic membrane and external ear normal.      Nose: Nose normal.      Mouth/Throat:      Mouth: Mucous membranes are moist. No oral lesions.      Pharynx: Oropharynx is clear.   Eyes:      Conjunctiva/sclera: Conjunctivae normal.      Pupils: Pupils are equal, round, and reactive to light.   Neck:      Thyroid: No thyromegaly.   Cardiovascular:      Rate and Rhythm: Normal rate and regular rhythm.      Pulses: Normal pulses.           Radial pulses are 2+ on the right side and 2+ on the left side.      Heart sounds: Normal heart sounds.   Pulmonary:      Effort: Pulmonary effort is normal.      Breath sounds: Normal breath sounds.   Musculoskeletal:      Cervical back: Normal range of motion.      Right lower leg: No edema.      Left lower leg: No edema.   Lymphadenopathy:      Cervical: No cervical adenopathy.   Skin:     General: Skin is warm and dry.             Comments: Airtight dressing in place/intact   Neurological:      Mental Status: She is alert and oriented to person, place, and time.      Gait: Gait normal.   Psychiatric:         Mood and Affect: Mood normal.         Speech: Speech normal.         Behavior: Behavior normal.         Thought Content: Thought content normal.               Assessment/Plan   Diagnoses and all orders for this visit:    1. Hospital discharge follow-up (Primary)    2. Cardiomyopathy, unspecified type (HCC)  -     Ambulatory Referral to Cardiology  -     lisinopril (PRINIVIL,ZESTRIL) 5 MG tablet; Take 1  tablet by mouth Daily.  Dispense: 90 tablet; Refill: 3    3. Pleural effusion  -     Ambulatory Referral to Cardiology    4. Hypertension, benign  -     Basic Metabolic Panel  -     Basic Metabolic Panel; Future    Mitzi reports she is feeling well.  She denies shortness of breath or chest pain.  BP is stable.  Will go ahead and trial a low dose ACE inhibitor with instructions to monitor BP at home.  She is to come to the office in 2 weeks to recheck BP and BMP as well.  She states she was given short course or Lasix and potassium at discharge.  She has been taking the Lasix but not the potassium. Will review lab work and see what appropriate action is.  Will go ahead and refer to cardiology as well.  She is requesting Dr. Garcia.    Continue monitoring weights and breathing status.

## 2021-12-04 LAB
BUN SERPL-MCNC: 15 MG/DL (ref 8–23)
BUN/CREAT SERPL: 20 (ref 7–25)
CALCIUM SERPL-MCNC: 9.2 MG/DL (ref 8.6–10.5)
CHLORIDE SERPL-SCNC: 98 MMOL/L (ref 98–107)
CO2 SERPL-SCNC: 32.7 MMOL/L (ref 22–29)
CREAT SERPL-MCNC: 0.75 MG/DL (ref 0.57–1)
GLUCOSE SERPL-MCNC: 91 MG/DL (ref 65–99)
POTASSIUM SERPL-SCNC: 4.3 MMOL/L (ref 3.5–5.2)
SODIUM SERPL-SCNC: 141 MMOL/L (ref 136–145)

## 2021-12-07 ENCOUNTER — READMISSION MANAGEMENT (OUTPATIENT)
Dept: CALL CENTER | Facility: HOSPITAL | Age: 76
End: 2021-12-07

## 2021-12-07 NOTE — OUTREACH NOTE
Medical Week 2 Survey      Responses   [facility] patient discharged from? [place]   Does the patient have one of the following disease processes/diagnoses(primary or secondary)? Other   Week 2 attempt successful? No   Unsuccessful attempts Attempt 2   Call end time 1620   Week 2 Call Completed? Yes   Is the patient interested in additional calls from an ambulatory ?  NOTE:  applies to high risk patients requiring additional follow-up. No   Revoked No further contact(revokes)-requires comment   Graduated/Revoked comments UTR x 4          Kayla Ladd RN

## 2021-12-13 DIAGNOSIS — I10 HYPERTENSION, BENIGN: ICD-10-CM

## 2021-12-14 ENCOUNTER — OFFICE VISIT (OUTPATIENT)
Dept: INTERNAL MEDICINE | Facility: CLINIC | Age: 76
End: 2021-12-14

## 2021-12-14 VITALS
WEIGHT: 110.8 LBS | DIASTOLIC BLOOD PRESSURE: 68 MMHG | BODY MASS INDEX: 20.39 KG/M2 | HEART RATE: 101 BPM | OXYGEN SATURATION: 98 % | SYSTOLIC BLOOD PRESSURE: 100 MMHG | HEIGHT: 62 IN | TEMPERATURE: 97.7 F

## 2021-12-14 DIAGNOSIS — I10 HYPERTENSION, BENIGN: Primary | ICD-10-CM

## 2021-12-14 DIAGNOSIS — I42.9 CARDIOMYOPATHY, UNSPECIFIED TYPE (HCC): ICD-10-CM

## 2021-12-14 LAB
BUN SERPL-MCNC: 14 MG/DL (ref 8–23)
BUN/CREAT SERPL: 19.7 (ref 7–25)
CALCIUM SERPL-MCNC: 8.8 MG/DL (ref 8.6–10.5)
CHLORIDE SERPL-SCNC: 107 MMOL/L (ref 98–107)
CO2 SERPL-SCNC: 28 MMOL/L (ref 22–29)
CREAT SERPL-MCNC: 0.71 MG/DL (ref 0.57–1)
GLUCOSE SERPL-MCNC: 86 MG/DL (ref 65–99)
POTASSIUM SERPL-SCNC: 4.2 MMOL/L (ref 3.5–5.2)
SODIUM SERPL-SCNC: 145 MMOL/L (ref 136–145)

## 2021-12-14 PROCEDURE — 99213 OFFICE O/P EST LOW 20 MIN: CPT | Performed by: NURSE PRACTITIONER

## 2021-12-14 NOTE — PROGRESS NOTES
Subjective   iMtzi Villafuerte is a 76 y.o. female.   Chief Complaint   Patient presents with   • Hypertension     2 wk f/u BMP & HTN       Mitzi presents today for 2 week follow up for a recent hospitalization related to cardiomyopathy and pleural effusion.  At her recent follow up she was restarted on her lisinopril 5mg .  BP today is 100/68.  She states she has been monitor her BP at home and has noticed readings around 110/60s.  She denies chest pain but has some shortness of breath at times when active.  She follows up with cardiology on 1/4/21.  BMP was rechecked showing stable labs.  She is not currently using Lasix or Potassium but does have both available at home.  She has not been monitoring daily weights but states she has noticed some swelling in her ankle today.         The following portions of the patient's history were reviewed and updated as appropriate: allergies, current medications, past family history, past medical history, past social history, past surgical history and problem list.    Review of Systems   Constitutional: Negative for activity change, appetite change, fatigue, fever, unexpected weight gain and unexpected weight loss.   HENT: Negative for swollen glands, trouble swallowing and voice change.    Eyes: Negative for blurred vision and visual disturbance.   Respiratory: Positive for shortness of breath (At times with exertion). Negative for cough.    Cardiovascular: Positive for leg swelling (mild in ankles). Negative for chest pain and palpitations.   Gastrointestinal: Negative for abdominal pain, constipation, diarrhea, nausea, vomiting and indigestion.   Endocrine: Negative for cold intolerance, heat intolerance, polydipsia and polyphagia.   Genitourinary: Negative for dysuria and frequency.   Musculoskeletal: Negative for arthralgias, back pain, joint swelling and neck pain.   Skin: Negative for color change, rash and skin lesions.   Neurological: Negative for dizziness, weakness,  headache, memory problem and confusion.   Hematological: Does not bruise/bleed easily.   Psychiatric/Behavioral: Negative for agitation, hallucinations and suicidal ideas. The patient is not nervous/anxious.        Objective   Past Medical History:   Diagnosis Date   • Fracture of hip (HCC)     left, due to lymphoma, treated non-operatively   • Hypertension    • Lymphoma (HCC)       Past Surgical History:   Procedure Laterality Date   • CHOLECYSTECTOMY     • HERNIA REPAIR     • HIP TROCHANTERIC NAILING WITH INTRAMEDULLARY HIP SCREW Left 2/2/2017    Procedure: HIP TROCANTERIC NAILING LONG WITH INTRAMEDULLARY HIP SCREW WITH CAST APPLICATION TO LEFT HAND;  Surgeon: Beni Culp MD;  Location: [facility] OR;  Service:         Current Outpatient Medications:   •  alendronate (Fosamax) 70 MG tablet, Take 1 tablet by mouth Every 7 (Seven) Days., Disp: 12 tablet, Rfl: 3  •  aspirin (aspirin) 81 MG EC tablet, Take 1 tablet by mouth Daily., Disp: 30 tablet, Rfl: 0  •  atorvastatin (Lipitor) 10 MG tablet, Take 1 tablet by mouth Daily., Disp: 30 tablet, Rfl: 0  •  carvedilol (Coreg) 12.5 MG tablet, Take 1 tablet by mouth Daily., Disp: , Rfl:   •  escitalopram (LEXAPRO) 10 MG tablet, TAKE 1 TABLET BY MOUTH EVERY DAY, Disp: 90 tablet, Rfl: 3  •  lidocaine (LIDODERM) 5 %, Place 2 patches on the skin as directed by provider Daily. Remove & Discard patch within 12 hours or as directed by MD, Disp: 30 patch, Rfl: 0  •  meloxicam (MOBIC) 7.5 MG tablet, Take 7.5 mg by mouth Daily., Disp: , Rfl:   •  oxyCODONE (ROXICODONE) 15 MG immediate release tablet, Take 1 mg by mouth 4 (Four) Times a Day As Needed., Disp: , Rfl:   •  potassium chloride 10 MEQ CR tablet, Take 1 tablet by mouth Daily., Disp: 7 tablet, Rfl: 0  •  vitamin B-6 (PYRIDOXINE) 50 MG tablet, Take 50 mg by mouth Daily., Disp: , Rfl:   •  vitamin D (ERGOCALCIFEROL) 1.25 MG (98395 UT) capsule capsule, Take 1 capsule by mouth Every 30 (Thirty) Days., Disp: 3 capsule, Rfl:  3  •  zolpidem (AMBIEN) 10 MG tablet, Take 0.5 tablets by mouth At Night As Needed for Sleep., Disp: 15 tablet, Rfl: 5  •  furosemide (Lasix) 40 MG tablet, Take 1 tablet by mouth Daily for 15 days., Disp: 15 tablet, Rfl: 0      Vitals:    21 1328   BP: 100/68   Pulse: 101   Temp: 97.7 °F (36.5 °C)   SpO2: 98%         21  1328   Weight: 50.3 kg (110 lb 12.8 oz)       Body mass index is 20.27 kg/m².    Physical Exam  HENT:      Right Ear: Tympanic membrane and external ear normal.      Left Ear: Tympanic membrane and external ear normal.      Nose: Nose normal.      Mouth/Throat:      Mouth: Mucous membranes are moist. No oral lesions.      Pharynx: Oropharynx is clear.   Eyes:      Conjunctiva/sclera: Conjunctivae normal.   Neck:      Thyroid: No thyromegaly.   Cardiovascular:      Rate and Rhythm: Normal rate and regular rhythm.      Pulses:           Radial pulses are 2+ on the right side and 2+ on the left side.      Heart sounds: Normal heart sounds.      Comments: Minimal edema noted bilaterally to ankles (right worse than left)  Pulmonary:      Effort: Pulmonary effort is normal.      Breath sounds: Normal breath sounds.   Musculoskeletal:      Cervical back: Normal range of motion.      Right lower le+ Edema present.      Left lower le+ Edema present.   Lymphadenopathy:      Cervical: No cervical adenopathy.   Skin:     General: Skin is warm and dry.   Neurological:      Mental Status: She is alert and oriented to person, place, and time.      Gait: Gait normal.   Psychiatric:         Mood and Affect: Mood normal.         Speech: Speech normal.         Behavior: Behavior normal.         Thought Content: Thought content normal.               Assessment/Plan   Diagnoses and all orders for this visit:    1. Hypertension, benign (Primary)    2. Cardiomyopathy, unspecified type (HCC)      BP is 100/68 today.  Will stop Lisinopril at this time.  Will see how her blood pressure does without to  avoid giving her hypotension.  Advised to monitor at home and notify the office for BP >130/80s.  Heart rate is initially elevated today but settled after sitting and talking with the provider.    Ok to use Lasix as needed for swelling, but advised taking Potassium supplement only on days she takes Lasix.    Keep appointment with cardiology in January.  Follow up sooner if needed.

## 2022-01-01 ENCOUNTER — TELEPHONE (OUTPATIENT)
Dept: INTERNAL MEDICINE | Facility: CLINIC | Age: 77
End: 2022-01-01

## 2022-01-04 ENCOUNTER — OFFICE VISIT (OUTPATIENT)
Dept: CARDIOLOGY | Facility: CLINIC | Age: 77
End: 2022-01-04

## 2022-01-04 VITALS
BODY MASS INDEX: 20.06 KG/M2 | HEIGHT: 62 IN | DIASTOLIC BLOOD PRESSURE: 83 MMHG | SYSTOLIC BLOOD PRESSURE: 150 MMHG | WEIGHT: 109 LBS | HEART RATE: 110 BPM

## 2022-01-04 DIAGNOSIS — E78.2 MIXED HYPERLIPIDEMIA: ICD-10-CM

## 2022-01-04 DIAGNOSIS — I42.9 CARDIOMYOPATHY, UNSPECIFIED TYPE: Primary | ICD-10-CM

## 2022-01-04 DIAGNOSIS — I10 HYPERTENSION, BENIGN: ICD-10-CM

## 2022-01-04 PROBLEM — I42.0 DILATED CARDIOMYOPATHY (HCC): Status: ACTIVE | Noted: 2017-08-16

## 2022-01-04 PROCEDURE — 99204 OFFICE O/P NEW MOD 45 MIN: CPT | Performed by: INTERNAL MEDICINE

## 2022-01-04 PROCEDURE — 93000 ELECTROCARDIOGRAM COMPLETE: CPT | Performed by: INTERNAL MEDICINE

## 2022-01-04 RX ORDER — METOPROLOL SUCCINATE 25 MG/1
25 TABLET, EXTENDED RELEASE ORAL DAILY
Qty: 90 TABLET | Refills: 3 | Status: SHIPPED | OUTPATIENT
Start: 2022-01-04 | End: 2022-04-04 | Stop reason: DRUGHIGH

## 2022-01-04 RX ORDER — FUROSEMIDE 20 MG/1
20 TABLET ORAL DAILY PRN
Qty: 15 TABLET | Refills: 3 | Status: SHIPPED | OUTPATIENT
Start: 2022-01-04 | End: 2022-03-28

## 2022-01-04 RX ORDER — LOSARTAN POTASSIUM 25 MG/1
25 TABLET ORAL DAILY
Qty: 90 TABLET | Refills: 3 | Status: SHIPPED | OUTPATIENT
Start: 2022-01-04 | End: 2022-08-09

## 2022-01-04 NOTE — PATIENT INSTRUCTIONS
WEIGH DAILY AND TAKE LASIX IF WT IS UP 2 LBS FROM THE DAY BEFORE.  CHECK BP AT HOME - DON'T TAKE LOSARTAN IS SBP<110

## 2022-01-04 NOTE — PROGRESS NOTES
Subjective    Mitzi Villafuerte is a 76 y.o. female. Referred by pcp for fu of a cardiomyopathy    History of Present Illness     NON-ISCHEMIC CARDIOMYOPATHY:  She had a remote cardiomyopathy thot 2/2 to chemo and ECHO's and fu was only with her Oncologist and her LVEF did subsequently normalize by ECHO. She was in good health until 10/21 when she developed soa that progressed rapidly to needing hospitalization. The dx was PNA with large pleural effusions requiring drainage. An ECHO was obtained showing s normal sized LV with global hypokinesis and an LVEF 31-35%. Subsequently she has been scheduled for OP cardiology eval by her pcp. Since discharge from [facility] 11/25/21, she has noted ambriz with light exertion but not at rest. Her stamina is still significantly declined from that of the past summer. She has no edema. She has been weighing regularly and has not had any wt gain. She is not on a vasodilator and does not have a wearable AED nor has it been mentioned by her report. She has not been advised on a low Na diet or how to use Lasix on a prn basis. EKG today is STACH(110), POOR-R, LOWV, NT-M, TATIANA. CT 11/22/21 there are improved lateral T-waves. trops were normal and pBNP declined rapidly in 2 days. The pleural fluid had WBC's but culture neg. Other labs are cw transudative fluid. Without prompting, she asks if this could be stress induced and relates that she was under a lot of emotional stress at that time.    The following portions of the patient's history were reviewed and updated as appropriate: allergies, current medications, past family history, past medical history, past social history, past surgical history and problem list.    Patient Active Problem List   Diagnosis   • Closed displaced comminuted fracture of shaft of left femur (HCC)   • Closed displaced fracture of shaft of third metacarpal bone of left hand   • B-cell lymphoma (HCC)   • Cardiomyopathy (HCC) EF 31-35%   • Chronic low back pain with  left-sided sciatica   • Chronic pain disorder   • Chronic pain due to neoplasm   • Hip pain   • Hypertension, benign   • Lung mass   • Mixed hyperlipidemia   • Primary insomnia   • Vitamin D deficiency   • Dilated cardiomyopathy (HCC)   • Pleural effusion   • SOB (shortness of breath)       Allergies   Allergen Reactions   • Phenergan [Promethazine Hcl]    • Promethazine Unknown - High Severity       Family History   Problem Relation Age of Onset   • Heart failure Mother    • Liver disease Father    • Heart disease Sister        Social History     Socioeconomic History   • Marital status: Single   Tobacco Use   • Smoking status: Former Smoker   • Smokeless tobacco: Never Used   • Tobacco comment: quit 55 years ago   Substance and Sexual Activity   • Alcohol use: No   • Drug use: No   • Sexual activity: Defer         Current Outpatient Medications:   •  alendronate (Fosamax) 70 MG tablet, Take 1 tablet by mouth Every 7 (Seven) Days., Disp: 12 tablet, Rfl: 3  •  aspirin (aspirin) 81 MG EC tablet, Take 1 tablet by mouth Daily., Disp: 30 tablet, Rfl: 0  •  atorvastatin (Lipitor) 10 MG tablet, Take 1 tablet by mouth Daily., Disp: 30 tablet, Rfl: 0  •  escitalopram (LEXAPRO) 10 MG tablet, TAKE 1 TABLET BY MOUTH EVERY DAY, Disp: 90 tablet, Rfl: 3  •  lidocaine (LIDODERM) 5 %, Place 2 patches on the skin as directed by provider Daily. Remove & Discard patch within 12 hours or as directed by MD, Disp: 30 patch, Rfl: 0  •  oxyCODONE (ROXICODONE) 15 MG immediate release tablet, Take 1 mg by mouth 4 (Four) Times a Day As Needed., Disp: , Rfl:   •  potassium chloride 10 MEQ CR tablet, Take 1 tablet by mouth Daily., Disp: 7 tablet, Rfl: 0  •  vitamin B-6 (PYRIDOXINE) 50 MG tablet, Take 50 mg by mouth Daily., Disp: , Rfl:   •  vitamin D (ERGOCALCIFEROL) 1.25 MG (49153 UT) capsule capsule, Take 1 capsule by mouth Every 30 (Thirty) Days., Disp: 3 capsule, Rfl: 3  •  zolpidem (AMBIEN) 10 MG tablet, Take 0.5 tablets by mouth At Night  "As Needed for Sleep., Disp: 15 tablet, Rfl: 5  •  furosemide (LASIX) 20 MG tablet, Take 1 tablet by mouth Daily As Needed (for wt gain of 2 lbs or more from the previous morning)., Disp: 15 tablet, Rfl: 3  •  furosemide (Lasix) 40 MG tablet, Take 1 tablet by mouth Daily for 15 days., Disp: 15 tablet, Rfl: 0  •  losartan (Cozaar) 25 MG tablet, Take 1 tablet by mouth Daily., Disp: 90 tablet, Rfl: 3  •  metoprolol succinate XL (TOPROL-XL) 25 MG 24 hr tablet, Take 1 tablet by mouth Daily., Disp: 90 tablet, Rfl: 3    Past Surgical History:   Procedure Laterality Date   • CHOLECYSTECTOMY     • HERNIA REPAIR     • HIP TROCHANTERIC NAILING WITH INTRAMEDULLARY HIP SCREW Left 2/2/2017    Procedure: HIP TROCANTERIC NAILING LONG WITH INTRAMEDULLARY HIP SCREW WITH CAST APPLICATION TO LEFT HAND;  Surgeon: Beni Culp MD;  Location: [facility] OR;  Service:        Review of Systems   Constitutional: Positive for activity change. Negative for appetite change, fatigue and unexpected weight change.   Respiratory: Positive for shortness of breath.    Cardiovascular: Negative for chest pain, palpitations and leg swelling.   Gastrointestinal: Negative for abdominal pain and blood in stool.   Genitourinary: Negative for difficulty urinating and hematuria.       /83   Pulse 110   Ht 157.5 cm (62\")   Wt 49.4 kg (109 lb)   LMP  (LMP Unknown)   BMI 19.94 kg/m²   Procedures    Objective   Physical Exam  Constitutional:       Appearance: Normal appearance.   Cardiovascular:      Rate and Rhythm: Regular rhythm. Tachycardia present.      Pulses: Decreased pulses.      Heart sounds: No murmur heard.  No friction rub. No gallop. No S3 or S4 sounds.       Comments: JVD<9, - HJR  Pulmonary:      Effort: Pulmonary effort is normal.      Breath sounds: Examination of the right-lower field reveals decreased breath sounds. Decreased breath sounds present.   Abdominal:      General: Bowel sounds are normal.      Tenderness: There is no " abdominal tenderness.   Musculoskeletal:      Right lower leg: No edema.      Left lower leg: No edema.   Skin:     General: Skin is warm and dry.   Neurological:      General: No focal deficit present.      Mental Status: She is oriented to person, place, and time.   Psychiatric:         Mood and Affect: Mood normal.         Behavior: Behavior normal.         Assessment/Plan   Diagnoses and all orders for this visit:    1. Cardiomyopathy, unspecified type (HCC) (Primary)  Comments:  doubt ischemia related. change BB from Coreg to Toprol and add Losartan. prn Lasix for wt gain only. fu ECHO 3/22. low Na diet. avoid NSAID's  Orders:  -     ECG 12 Lead  -     metoprolol succinate XL (TOPROL-XL) 25 MG 24 hr tablet; Take 1 tablet by mouth Daily.  Dispense: 90 tablet; Refill: 3  -     losartan (Cozaar) 25 MG tablet; Take 1 tablet by mouth Daily.  Dispense: 90 tablet; Refill: 3  -     Cancel: Adult Transthoracic Echo Complete W/ Cont if Necessary Per Protocol  -     Adult Transthoracic Echo Complete W/ Cont if Necessary Per Protocol; Future  -     furosemide (LASIX) 20 MG tablet; Take 1 tablet by mouth Daily As Needed (for wt gain of 2 lbs or more from the previous morning).  Dispense: 15 tablet; Refill: 3    2. Hypertension, benign  Comments:  home monitoring with target <130/80    3. Mixed hyperlipidemia  Comments:  on low potent statin                 Return in about 3 months (around 4/4/2022).  Orders Placed This Encounter   Procedures   • ECG 12 Lead     Order Specific Question:   Reason for Exam:     Answer:   CARDIOMYOPATHY/     Order Specific Question:   Release to patient     Answer:   Immediate   • Adult Transthoracic Echo Complete W/ Cont if Necessary Per Protocol     Standing Status:   Future     Standing Expiration Date:   1/4/2023     Order Specific Question:   Reason for exam?     Answer:   Dyspnea     Order Specific Question:   Reason for exam?     Answer:   Heart Failure, Cardiomyopathy, or Sytemic or  Pulmonary Hypertension     Order Specific Question:   Hypertension, Heart Failure, or Cardiomyopathy specification?     Answer:   Known Cardiomyopathy

## 2022-01-27 ENCOUNTER — TELEPHONE (OUTPATIENT)
Dept: INTERNAL MEDICINE | Facility: CLINIC | Age: 77
End: 2022-01-27

## 2022-01-27 NOTE — TELEPHONE ENCOUNTER
Caller: Anurag Hardy    Relationship: Self    Best call back number: [phone]     What is the best time to reach you: ANYTIME     Who are you requesting to speak with (clinical staff, provider,  specific staff member): CLINICAL STAFF     Do you know the name of the person who called: ANURAG HARDY     What was the call regarding: PATIENT REQUESTING A SAME DAY APPOINTMENT THIS AFTERNOON AFTER 1. INFORMED PATIENT THAT THERE IS NO AVAILABILITY AFTER 11:45 AND PATIENT WOULD LIKE TO KNOW WHAT SHE SHOULD DO REGARDING THE SWELLING SHE IS EXPERIENCING IN ANKLES AND LEGS. PLEASE ADVISE.     Do you require a callback: YES

## 2022-01-27 NOTE — TELEPHONE ENCOUNTER
I can do telehealth but may recommend urgent care or other walk in clinic depending on patient status.  She can also check with her cardiologist.

## 2022-01-27 NOTE — TELEPHONE ENCOUNTER
Spoke with pt at this time she just scheduled an apt for Monday. She will go to  or walk in if anything gets worse

## 2022-01-31 ENCOUNTER — OFFICE VISIT (OUTPATIENT)
Dept: INTERNAL MEDICINE | Facility: CLINIC | Age: 77
End: 2022-01-31

## 2022-01-31 VITALS
HEIGHT: 62 IN | WEIGHT: 113 LBS | SYSTOLIC BLOOD PRESSURE: 130 MMHG | OXYGEN SATURATION: 97 % | HEART RATE: 98 BPM | TEMPERATURE: 97.3 F | DIASTOLIC BLOOD PRESSURE: 80 MMHG | BODY MASS INDEX: 20.8 KG/M2

## 2022-01-31 DIAGNOSIS — I42.0 DILATED CARDIOMYOPATHY: ICD-10-CM

## 2022-01-31 DIAGNOSIS — R60.0 BILATERAL LOWER EXTREMITY EDEMA: Primary | ICD-10-CM

## 2022-01-31 PROCEDURE — 99213 OFFICE O/P EST LOW 20 MIN: CPT | Performed by: NURSE PRACTITIONER

## 2022-01-31 NOTE — PATIENT INSTRUCTIONS
Start Lasix 20mg daily every day for the next 7 days  Wear compression hose during the day  Avoiding letting legs dangle while sitting.   Low salt diet/avoid processed foods.   Follow up for continued issue.

## 2022-01-31 NOTE — PROGRESS NOTES
Subjective   Mitzi Villafuerte is a 76 y.o. female.   Chief Complaint   Patient presents with   • Lower Extremity Issue     Bilateral ankle swelling since last Weds afternoon, swelling off and on, discuss possible increase on Lasix       Mitzi presents today for swelling to the lower extremities.  She states she noticed worsening swelling about 4 days ago.  She states she has been more active lately due to building a new home.  She states she has gained 2 lbs over the past week.  She does have lasix 20mg that she uses as needed, she states she does not take this every day.  She denies worsening shortness of breath over the past week.  She states it is a lot better over the past 5-6 days.  She states she does sit with legs dangling.  Swelling is better in the morning after having her feet up.         The following portions of the patient's history were reviewed and updated as appropriate: allergies, current medications, past family history, past medical history, past social history, past surgical history and problem list.    Review of Systems   Constitutional: Negative for activity change, appetite change, fatigue, fever, unexpected weight gain and unexpected weight loss.   HENT: Negative for swollen glands, trouble swallowing and voice change.    Eyes: Negative for blurred vision and visual disturbance.   Respiratory: Negative for cough and shortness of breath.    Cardiovascular: Positive for leg swelling. Negative for chest pain and palpitations.   Gastrointestinal: Negative for abdominal pain, constipation, diarrhea, nausea, vomiting and indigestion.   Endocrine: Negative for cold intolerance, heat intolerance, polydipsia and polyphagia.   Genitourinary: Negative for dysuria and frequency.   Musculoskeletal: Negative for arthralgias, back pain, joint swelling and neck pain.   Skin: Negative for color change, rash and skin lesions.   Neurological: Negative for dizziness, weakness, headache, memory problem and  confusion.   Hematological: Does not bruise/bleed easily.   Psychiatric/Behavioral: Negative for agitation, hallucinations and suicidal ideas. The patient is not nervous/anxious.        Objective   Past Medical History:   Diagnosis Date   • Fracture of hip (HCC)     left, due to lymphoma, treated non-operatively   • Hypertension    • Lymphoma (HCC)       Past Surgical History:   Procedure Laterality Date   • CHOLECYSTECTOMY     • HERNIA REPAIR     • HIP TROCHANTERIC NAILING WITH INTRAMEDULLARY HIP SCREW Left 2/2/2017    Procedure: HIP TROCANTERIC NAILING LONG WITH INTRAMEDULLARY HIP SCREW WITH CAST APPLICATION TO LEFT HAND;  Surgeon: Beni Culp MD;  Location: [facility] OR;  Service:         Current Outpatient Medications:   •  alendronate (Fosamax) 70 MG tablet, Take 1 tablet by mouth Every 7 (Seven) Days., Disp: 12 tablet, Rfl: 3  •  aspirin (aspirin) 81 MG EC tablet, Take 1 tablet by mouth Daily., Disp: 30 tablet, Rfl: 0  •  escitalopram (LEXAPRO) 10 MG tablet, TAKE 1 TABLET BY MOUTH EVERY DAY, Disp: 90 tablet, Rfl: 3  •  furosemide (LASIX) 20 MG tablet, Take 1 tablet by mouth Daily As Needed (for wt gain of 2 lbs or more from the previous morning)., Disp: 15 tablet, Rfl: 3  •  lidocaine (LIDODERM) 5 %, Place 2 patches on the skin as directed by provider Daily. Remove & Discard patch within 12 hours or as directed by MD, Disp: 30 patch, Rfl: 0  •  losartan (Cozaar) 25 MG tablet, Take 1 tablet by mouth Daily., Disp: 90 tablet, Rfl: 3  •  metoprolol succinate XL (TOPROL-XL) 25 MG 24 hr tablet, Take 1 tablet by mouth Daily., Disp: 90 tablet, Rfl: 3  •  oxyCODONE (ROXICODONE) 15 MG immediate release tablet, Take 1 mg by mouth 4 (Four) Times a Day As Needed., Disp: , Rfl:   •  potassium chloride 10 MEQ CR tablet, Take 1 tablet by mouth Daily., Disp: 7 tablet, Rfl: 0  •  vitamin B-6 (PYRIDOXINE) 50 MG tablet, Take 50 mg by mouth Daily., Disp: , Rfl:   •  vitamin D (ERGOCALCIFEROL) 1.25 MG (43038 UT) capsule  capsule, Take 1 capsule by mouth Every 30 (Thirty) Days., Disp: 3 capsule, Rfl: 3  •  zolpidem (AMBIEN) 10 MG tablet, Take 0.5 tablets by mouth At Night As Needed for Sleep., Disp: 15 tablet, Rfl: 5  •  atorvastatin (Lipitor) 10 MG tablet, Take 1 tablet by mouth Daily., Disp: 30 tablet, Rfl: 0      Vitals:    22 1423   BP: 130/80   Pulse: 98   Temp: 97.3 °F (36.3 °C)   SpO2: 97%         22  1423   Weight: 51.3 kg (113 lb)       Body mass index is 20.67 kg/m².    Physical Exam  Constitutional:       General: She is not in acute distress.     Appearance: She is well-developed.   HENT:      Head: Normocephalic.      Right Ear: External ear normal.      Left Ear: External ear normal.      Mouth/Throat:      Mouth: No oral lesions.   Eyes:      Conjunctiva/sclera: Conjunctivae normal.   Cardiovascular:      Rate and Rhythm: Normal rate and regular rhythm.      Pulses: Normal pulses.      Heart sounds: Normal heart sounds.   Pulmonary:      Effort: Pulmonary effort is normal.      Breath sounds: Normal breath sounds.   Musculoskeletal:      Right lower leg: 3+ Edema present.      Left lower le+ Edema present.   Skin:     General: Skin is warm and dry.   Neurological:      Mental Status: She is alert and oriented to person, place, and time.      Gait: Gait normal.   Psychiatric:         Mood and Affect: Mood normal.         Speech: Speech normal.         Behavior: Behavior normal.         Thought Content: Thought content normal.               Assessment/Plan   Diagnoses and all orders for this visit:    1. Bilateral lower extremity edema (Primary)  -     Compression Stockings  -     Basic Metabolic Panel    2. Dilated cardiomyopathy (HCC)      I've discussed keeping legs elevated and avoid dangling them for extended periods.  Recommend compression hose as well which I have provided an order for.  Take lasix 20mg daily consistently for the next 7 days to remove excess fluid.  Recommend lot salt diet as well.   Her breathing is stable.  I've instructed she continue with ECHO she has scheduled soon.  If no improvement or symptoms worsen, will need further evaluation.  May need to move forward with arterial or venous scans to evaluate further.

## 2022-02-15 ENCOUNTER — HOSPITAL ENCOUNTER (OUTPATIENT)
Dept: CARDIOLOGY | Facility: HOSPITAL | Age: 77
Discharge: HOME | End: 2022-02-15
Admitting: INTERNAL MEDICINE

## 2022-02-15 VITALS
HEIGHT: 62 IN | WEIGHT: 113 LBS | DIASTOLIC BLOOD PRESSURE: 89 MMHG | BODY MASS INDEX: 20.8 KG/M2 | SYSTOLIC BLOOD PRESSURE: 138 MMHG

## 2022-02-15 DIAGNOSIS — R06.09 DOE (DYSPNEA ON EXERTION): Primary | ICD-10-CM

## 2022-02-15 LAB
BH CV ECHO LEFT VENTRICLE GLOBAL LONGITUDINAL STRAIN: -3.2 %
BH CV ECHO MEAS - AO MAX PG (FULL): 4.7 MMHG
BH CV ECHO MEAS - AO MAX PG: 7.2 MMHG
BH CV ECHO MEAS - AO MEAN PG (FULL): 3 MMHG
BH CV ECHO MEAS - AO MEAN PG: 4 MMHG
BH CV ECHO MEAS - AO ROOT AREA (BSA CORRECTED): 1.5
BH CV ECHO MEAS - AO ROOT AREA: 3.8 CM^2
BH CV ECHO MEAS - AO ROOT DIAM: 2.2 CM
BH CV ECHO MEAS - AO V2 MAX: 134 CM/SEC
BH CV ECHO MEAS - AO V2 MEAN: 89.6 CM/SEC
BH CV ECHO MEAS - AO V2 VTI: 22.1 CM
BH CV ECHO MEAS - AVA(I,A): 1.4 CM^2
BH CV ECHO MEAS - AVA(I,D): 1.4 CM^2
BH CV ECHO MEAS - AVA(V,A): 1.5 CM^2
BH CV ECHO MEAS - AVA(V,D): 1.5 CM^2
BH CV ECHO MEAS - BSA(HAYCOCK): 1.5 M^2
BH CV ECHO MEAS - BSA: 1.5 M^2
BH CV ECHO MEAS - BZI_BMI: 20.7 KILOGRAMS/M^2
BH CV ECHO MEAS - BZI_METRIC_HEIGHT: 157.5 CM
BH CV ECHO MEAS - BZI_METRIC_WEIGHT: 51.3 KG
BH CV ECHO MEAS - EDV(CUBED): 156.6 ML
BH CV ECHO MEAS - EDV(MOD-SP4): 80.9 ML
BH CV ECHO MEAS - EDV(TEICH): 140.7 ML
BH CV ECHO MEAS - EF(CUBED): 31.1 %
BH CV ECHO MEAS - EF(MOD-SP4): 23.1 %
BH CV ECHO MEAS - EF(TEICH): 25.1 %
BH CV ECHO MEAS - ESV(CUBED): 107.9 ML
BH CV ECHO MEAS - ESV(MOD-SP4): 62.2 ML
BH CV ECHO MEAS - ESV(TEICH): 105.4 ML
BH CV ECHO MEAS - FS: 11.7 %
BH CV ECHO MEAS - IVS/LVPW: 0.99
BH CV ECHO MEAS - IVSD: 0.86 CM
BH CV ECHO MEAS - LA DIMENSION: 4.1 CM
BH CV ECHO MEAS - LA/AO: 1.9
BH CV ECHO MEAS - LAT PEAK E' VEL: 3.5 CM/SEC
BH CV ECHO MEAS - LV DIASTOLIC VOL/BSA (35-75): 53.9 ML/M^2
BH CV ECHO MEAS - LV MASS(C)D: 169.5 GRAMS
BH CV ECHO MEAS - LV MASS(C)DI: 113 GRAMS/M^2
BH CV ECHO MEAS - LV MAX PG: 2.5 MMHG
BH CV ECHO MEAS - LV MEAN PG: 1 MMHG
BH CV ECHO MEAS - LV SYSTOLIC VOL/BSA (12-30): 41.5 ML/M^2
BH CV ECHO MEAS - LV V1 MAX: 78.3 CM/SEC
BH CV ECHO MEAS - LV V1 MEAN: 56.4 CM/SEC
BH CV ECHO MEAS - LV V1 VTI: 11.8 CM
BH CV ECHO MEAS - LVIDD: 5.4 CM
BH CV ECHO MEAS - LVIDS: 4.8 CM
BH CV ECHO MEAS - LVLD AP4: 7.5 CM
BH CV ECHO MEAS - LVLS AP4: 7.1 CM
BH CV ECHO MEAS - LVOT AREA (M): 2.5 CM^2
BH CV ECHO MEAS - LVOT AREA: 2.5 CM^2
BH CV ECHO MEAS - LVOT DIAM: 1.8 CM
BH CV ECHO MEAS - LVPWD: 0.87 CM
BH CV ECHO MEAS - MED PEAK E' VEL: 8.59 CM/SEC
BH CV ECHO MEAS - MV A MAX VEL: 38.6 CM/SEC
BH CV ECHO MEAS - MV DEC TIME: 0.1 SEC
BH CV ECHO MEAS - MV E MAX VEL: 92.4 CM/SEC
BH CV ECHO MEAS - MV E/A: 2.4
BH CV ECHO MEAS - RAP SYSTOLE: 5 MMHG
BH CV ECHO MEAS - RVSP: 65.2 MMHG
BH CV ECHO MEAS - SI(AO): 56 ML/M^2
BH CV ECHO MEAS - SI(CUBED): 32.5 ML/M^2
BH CV ECHO MEAS - SI(LVOT): 20 ML/M^2
BH CV ECHO MEAS - SI(MOD-SP4): 12.5 ML/M^2
BH CV ECHO MEAS - SI(TEICH): 23.5 ML/M^2
BH CV ECHO MEAS - SV(AO): 84 ML
BH CV ECHO MEAS - SV(CUBED): 48.7 ML
BH CV ECHO MEAS - SV(LVOT): 30 ML
BH CV ECHO MEAS - SV(MOD-SP4): 18.7 ML
BH CV ECHO MEAS - SV(TEICH): 35.3 ML
BH CV ECHO MEAS - TR MAX VEL: 388 CM/SEC
BH CV ECHO MEASUREMENTS AVERAGE E/E' RATIO: 15.29
LEFT ATRIUM VOLUME INDEX: 38.7 ML/M2
LEFT ATRIUM VOLUME: 58 CM3
MAXIMAL PREDICTED HEART RATE: 144 BPM
STRESS TARGET HR: 122 BPM

## 2022-02-15 PROCEDURE — 93306 TTE W/DOPPLER COMPLETE: CPT | Performed by: INTERNAL MEDICINE

## 2022-02-15 PROCEDURE — 93356 MYOCRD STRAIN IMG SPCKL TRCK: CPT | Performed by: INTERNAL MEDICINE

## 2022-02-15 PROCEDURE — 93356 MYOCRD STRAIN IMG SPCKL TRCK: CPT

## 2022-02-15 PROCEDURE — 93306 TTE W/DOPPLER COMPLETE: CPT

## 2022-02-23 ENCOUNTER — PREP FOR SURGERY (OUTPATIENT)
Dept: OTHER | Facility: HOSPITAL | Age: 77
End: 2022-02-23

## 2022-02-23 ENCOUNTER — OFFICE VISIT (OUTPATIENT)
Dept: CARDIOLOGY | Facility: CLINIC | Age: 77
End: 2022-02-23

## 2022-02-23 ENCOUNTER — TELEPHONE (OUTPATIENT)
Dept: CARDIOLOGY | Facility: CLINIC | Age: 77
End: 2022-02-23

## 2022-02-23 VITALS
BODY MASS INDEX: 20.61 KG/M2 | HEIGHT: 62 IN | SYSTOLIC BLOOD PRESSURE: 142 MMHG | WEIGHT: 112 LBS | HEART RATE: 111 BPM | OXYGEN SATURATION: 97 % | DIASTOLIC BLOOD PRESSURE: 90 MMHG | RESPIRATION RATE: 18 BRPM

## 2022-02-23 DIAGNOSIS — I42.0 DILATED CARDIOMYOPATHY: Primary | ICD-10-CM

## 2022-02-23 DIAGNOSIS — I10 HYPERTENSION, BENIGN: ICD-10-CM

## 2022-02-23 DIAGNOSIS — E78.2 MIXED HYPERLIPIDEMIA: ICD-10-CM

## 2022-02-23 DIAGNOSIS — R00.0 TACHYCARDIA: ICD-10-CM

## 2022-02-23 DIAGNOSIS — I50.22 CHRONIC SYSTOLIC HEART FAILURE: Primary | ICD-10-CM

## 2022-02-23 DIAGNOSIS — I50.22 CHRONIC SYSTOLIC HEART FAILURE: ICD-10-CM

## 2022-02-23 DIAGNOSIS — J90 PLEURAL EFFUSION: ICD-10-CM

## 2022-02-23 PROCEDURE — 93000 ELECTROCARDIOGRAM COMPLETE: CPT | Performed by: NURSE PRACTITIONER

## 2022-02-23 PROCEDURE — 99215 OFFICE O/P EST HI 40 MIN: CPT | Performed by: NURSE PRACTITIONER

## 2022-02-23 RX ORDER — SODIUM CHLORIDE 0.9 % (FLUSH) 0.9 %
3 SYRINGE (ML) INJECTION EVERY 12 HOURS SCHEDULED
Status: CANCELLED | OUTPATIENT
Start: 2022-02-23

## 2022-02-23 RX ORDER — ACETAMINOPHEN 325 MG/1
650 TABLET ORAL EVERY 4 HOURS PRN
Status: CANCELLED | OUTPATIENT
Start: 2022-02-23

## 2022-02-23 RX ORDER — CEFAZOLIN SODIUM 1 G/50ML
1 INJECTION, SOLUTION INTRAVENOUS ONCE
Status: CANCELLED | OUTPATIENT
Start: 2022-02-23 | End: 2022-02-23

## 2022-02-23 RX ORDER — SPIRONOLACTONE 25 MG/1
25 TABLET ORAL DAILY
Qty: 30 TABLET | Refills: 11 | Status: SHIPPED | OUTPATIENT
Start: 2022-02-23

## 2022-02-23 RX ORDER — SODIUM CHLORIDE 0.9 % (FLUSH) 0.9 %
10 SYRINGE (ML) INJECTION AS NEEDED
Status: CANCELLED | OUTPATIENT
Start: 2022-02-23

## 2022-02-23 RX ORDER — ONDANSETRON 2 MG/ML
4 INJECTION INTRAMUSCULAR; INTRAVENOUS EVERY 6 HOURS PRN
Status: CANCELLED | OUTPATIENT
Start: 2022-02-23

## 2022-02-23 NOTE — PROGRESS NOTES
[facility]     Chief Complaint   Patient presents with   • Results   • Shortness of Breath   • Edema     both legs and ankles       Subjective .     History of present illness:  iMtzi Villafuerte is a 76 y.o. female following up after an echo, AICD discussion and heart failure. Patient in 2012 was treated for lymphoma. She notes she had an LVEF of 55% pre chemo, 45% during treatment and 25% after treatments were done. She notes in 2013 after treatments were completed her LVEF was 60%. She notes that up until this last November she was not aware of any heart problems.  She also has known hypertension, hyperlipidemia, femur fracture, chronic back pain. She was admitted in November into the hospital. She was found to be tachycardic with a heart rate of 140, volume overloaded with a pleural effusion. She had an Echo at that time which showed an EF of 31-35%, global hypokinesis, and a pleural effusion. Patient had a thoracentesis and a catheter was placed in the effusion. She established with Dr. Gordillo in January. He switched her beta blocker to Toprol and started her on Losartan. He also ordered a follow up echo which was done on 2/15/22 showed that her EF has dropped to 21-25%, left ventricular systolic function that is severely decreased, right ventricular systolic pressure from tricuspid regurgitation is markedly elevated (>55mmHg), severe pulmonary hypertension, moderately dilated right atrial cavity, moderate tricuspid valve regurgitation, right ventricular cavity is also mildly dilated, and Left ventricular diastolic function is consistent with (grade Ia w/high LAP) impaired relaxation. In review she notes she feels like she started to have issues following Covid last August. She notes she tolerated her medication changes well. She notes she has used PRN Lasix several times with good response. She checks her BP and weight at home. No weight gain. She does note shortness of breath on  exertion and leg swelling. Denies chest pain or palpitations. She follows with Melissa MORALES as her PCP.     History  Past Medical History:   Diagnosis Date   • Fracture of hip (HCC)     left, due to lymphoma, treated non-operatively   • Hypertension    • Lymphoma (HCC)    ,   Past Surgical History:   Procedure Laterality Date   • CHOLECYSTECTOMY     • HERNIA REPAIR     • HIP TROCHANTERIC NAILING WITH INTRAMEDULLARY HIP SCREW Left 2/2/2017    Procedure: HIP TROCANTERIC NAILING LONG WITH INTRAMEDULLARY HIP SCREW WITH CAST APPLICATION TO LEFT HAND;  Surgeon: Beni Culp MD;  Location: [facility] OR;  Service:    ,   Family History   Problem Relation Age of Onset   • Heart failure Mother    • Liver disease Father    • Heart disease Sister    ,   Social History     Tobacco Use   • Smoking status: Former Smoker   • Smokeless tobacco: Never Used   • Tobacco comment: quit 55 years ago   Substance Use Topics   • Alcohol use: No   • Drug use: No   ,     Medications  Current Outpatient Medications   Medication Sig Dispense Refill   • alendronate (Fosamax) 70 MG tablet Take 1 tablet by mouth Every 7 (Seven) Days. 12 tablet 3   • aspirin (aspirin) 81 MG EC tablet Take 1 tablet by mouth Daily. 30 tablet 0   • atorvastatin (Lipitor) 10 MG tablet Take 1 tablet by mouth Daily. 30 tablet 0   • escitalopram (LEXAPRO) 10 MG tablet TAKE 1 TABLET BY MOUTH EVERY DAY 90 tablet 3   • furosemide (LASIX) 20 MG tablet Take 1 tablet by mouth Daily As Needed (for wt gain of 2 lbs or more from the previous morning). 15 tablet 3   • lidocaine (LIDODERM) 5 % Place 2 patches on the skin as directed by provider Daily. Remove & Discard patch within 12 hours or as directed by MD 30 patch 0   • losartan (Cozaar) 25 MG tablet Take 1 tablet by mouth Daily. 90 tablet 3   • metoprolol succinate XL (TOPROL-XL) 25 MG 24 hr tablet Take 1 tablet by mouth Daily. 90 tablet 3   • oxyCODONE (ROXICODONE) 15 MG immediate release tablet Take 1 mg by mouth 4  "(Four) Times a Day As Needed.     • potassium chloride 10 MEQ CR tablet Take 1 tablet by mouth Daily. 7 tablet 0   • vitamin B-6 (PYRIDOXINE) 50 MG tablet Take 50 mg by mouth Daily.     • vitamin D (ERGOCALCIFEROL) 1.25 MG (16288 UT) capsule capsule Take 1 capsule by mouth Every 30 (Thirty) Days. 3 capsule 3   • zolpidem (AMBIEN) 10 MG tablet Take 0.5 tablets by mouth At Night As Needed for Sleep. 15 tablet 5     No current facility-administered medications for this visit.       Allergies:  Phenergan [promethazine hcl] and Promethazine    Review of Systems  Review of Systems   Constitutional: Positive for malaise/fatigue. Negative for chills, fever and weight loss.   HENT: Negative for congestion, hearing loss, nosebleeds and sore throat.    Eyes: Negative for blurred vision, pain and photophobia.   Cardiovascular: Positive for dyspnea on exertion and leg swelling. Negative for chest pain, claudication, irregular heartbeat, palpitations and syncope.   Respiratory: Positive for shortness of breath. Negative for cough.    Skin: Negative for rash.   Musculoskeletal: Positive for joint pain. Negative for muscle cramps, muscle weakness and stiffness.   Gastrointestinal: Negative for bloating, abdominal pain, change in bowel habit, bowel incontinence, diarrhea, nausea and vomiting.   Genitourinary: Negative for bladder incontinence, dysuria, frequency and nocturia.   Neurological: Negative for dizziness, headaches and light-headedness.       Objective     Physical Exam:  Blood pressure 142/90, pulse 111, resp. rate 18, height 157.5 cm (62\"), weight 50.8 kg (112 lb), SpO2 97 %, not currently breastfeeding.   Constitutional:       Appearance: Frail. Chronically ill-appearing.   HENT:      Nose: Nose normal.    Mouth/Throat:      Pharynx: Oropharynx is clear.   Pulmonary:      Effort: Pulmonary effort is normal.      Breath sounds: Normal breath sounds.   Cardiovascular:      Tachycardia present. Regular rhythm.      " Murmurs: There is no murmur.   Edema:     Peripheral edema present.     Ankle: bilateral 1+ pitting edema of the ankle.  Abdominal:      General: Bowel sounds are normal.   Musculoskeletal: Normal range of motion.      Cervical back: Normal range of motion and neck supple. Skin:     General: Skin is warm and dry.   Neurological:      Mental Status: Alert and oriented to person, place and time.         Results Review:  I have reviewed Dr. Norman notes. EKGs form November admission.    I reviewed the patient's new clinical results.    Lab Results   Component Value Date    CHOL 118 11/25/2021    CHLPL 165 04/12/2021    TRIG 117 11/25/2021    HDL 28 (L) 11/25/2021    LDL 68 11/25/2021        Results for orders placed in visit on 02/15/22    Adult Transthoracic Echo Complete W/ Cont if Necessary Per Protocol    Interpretation Summary  · The left ventricular cavity is moderately dilated.  · Estimated right ventricular systolic pressure from tricuspid regurgitation is markedly elevated (>55 mmHg).  · Severe pulmonary hypertension is present.  · The right atrial cavity is moderately dilated.  · Left ventricular diastolic function is consistent with (grade Ia w/high LAP) impaired relaxation.  · Left ventricular ejection fraction appears to be 21 - 25%. Left ventricular systolic function is severely decreased.  · Moderate tricuspid valve regurgitation is present.  · The right ventricular cavity is mildly dilated.    SEVERE DILATED CARDIOMYOPATHY         ECG 12 Lead    Date/Time: 2/23/2022 12:15 PM  Performed by: Romeo Shaffer APRN  Authorized by: Romeo Shaffer APRN   Comparison: compared with previous ECG from 1/4/2022  Rhythm: sinus tachycardia  Ectopy: atrial premature contractions             Assessment       ICD-10-CM ICD-9-CM   1. Dilated cardiomyopathy (HCC)  I42.0 425.4   2. Chronic systolic heart failure (HCC)  I50.22 428.22   3. Hypertension, benign  I10 401.1   4. Mixed hyperlipidemia  E78.2 272.2   5.  "Pleural effusion  J90 511.9   6. Tachycardia  R00.0 785.0        Plan   1/2. Cardiomyopathy/Chronic systolic heart failure-  90 day follow up echo on 02/15/2022 shows an EF of 21-25%. Of note she had a reduced LVEF during chemo in 2012 but she reports was 60% on follow up echo. She has tolerated Toprol and Losartan thus far. She does note shortness of breath on exertion and lower leg edema. Overall stable. She has taken PRN Lasix a few times with good response. Will add aldactone today as I suspect she may by mildly decompensated. Will see in close follow up to further optimize medications. Consider increase Toprol, Entresto and SLGL2. I have discussed with Dr. Gordillo today- no ischemic evaluation is recommended. Will plans for AICD. Follow up in [place] in 3 weeks. Class II symptoms.     3. Hypertension- Mildly elevated in the office today. Home logs show that this is well controlled. Continue Lostartan and Toprol XL. Add Aldactone.     4. Hyperlipidemia- Labs in November show controlled with low HDL. Continue Atorvastatin.      5. Pleural Effusion - had thoracentesis and drain placed in November admission.     6. Tachycardia - was noted with tachycardia upon arrival to November. Reviewed today's EKG with Dr. Gordillo- NSR with PACs vs MAT. Needs improved heart rate control. Home heart rates are .     Shared Decision Making for AICD implantation has been discussed with patient. I have reviewed [employer] [facility] [employer] \"A decision aid for Implantable Cardioverter-Defibrillatiors (ICD)\". Risks and benefits of procedure discussed with patient. Post op limitations discussed with patient. Patient verbalizes understanding and wishes to proceed with AICD implantation. Copy of pamphlet given to patient.     I spent 50 minutes caring for Mitzi on this date of service. This time includes time spent by me in the following activities:preparing for the visit, reviewing tests, obtaining and/or reviewing a " separately obtained history, performing a medically appropriate examination and/or evaluation , counseling and educating the patient/family/caregiver, ordering medications, tests, or procedures, referring and communicating with other health care professionals , documenting information in the medical record, independently interpreting results and communicating that information with the patient/family/caregiver and care coordination

## 2022-02-23 NOTE — TELEPHONE ENCOUNTER
Laurie Jon called wanting to talk to you more about the defibrillator and had questions for you concerning the next steps after this.   I told her you would not be in office tomorrow so she wanted you to call her on Friday.  Laurie Jon -[phone]

## 2022-03-02 ENCOUNTER — APPOINTMENT (OUTPATIENT)
Dept: GENERAL RADIOLOGY | Facility: HOSPITAL | Age: 77
End: 2022-03-02

## 2022-03-02 ENCOUNTER — HOSPITAL ENCOUNTER (OUTPATIENT)
Facility: HOSPITAL | Age: 77
Discharge: HOME | End: 2022-03-03
Attending: INTERNAL MEDICINE | Admitting: INTERNAL MEDICINE

## 2022-03-02 DIAGNOSIS — I50.22 CHRONIC SYSTOLIC HEART FAILURE: ICD-10-CM

## 2022-03-02 PROCEDURE — C1892 INTRO/SHEATH,FIXED,PEEL-AWAY: HCPCS | Performed by: INTERNAL MEDICINE

## 2022-03-02 PROCEDURE — A9270 NON-COVERED ITEM OR SERVICE: HCPCS | Performed by: INTERNAL MEDICINE

## 2022-03-02 PROCEDURE — 33249 INSJ/RPLCMT DEFIB W/LEAD(S): CPT | Performed by: INTERNAL MEDICINE

## 2022-03-02 PROCEDURE — 63710000001 OXYCODONE 5 MG TABLET: Performed by: INTERNAL MEDICINE

## 2022-03-02 PROCEDURE — 25010000002 DIPHENHYDRAMINE PER 50 MG: Performed by: INTERNAL MEDICINE

## 2022-03-02 PROCEDURE — 25010000002 FENTANYL CITRATE (PF) 100 MCG/2ML SOLUTION: Performed by: INTERNAL MEDICINE

## 2022-03-02 PROCEDURE — 99153 MOD SED SAME PHYS/QHP EA: CPT | Performed by: INTERNAL MEDICINE

## 2022-03-02 PROCEDURE — 63710000001 MUPIROCIN 2 % OINTMENT: Performed by: INTERNAL MEDICINE

## 2022-03-02 PROCEDURE — 25010000002 ONDANSETRON PER 1 MG: Performed by: INTERNAL MEDICINE

## 2022-03-02 PROCEDURE — 99152 MOD SED SAME PHYS/QHP 5/>YRS: CPT | Performed by: INTERNAL MEDICINE

## 2022-03-02 PROCEDURE — C1721 AICD, DUAL CHAMBER: HCPCS | Performed by: INTERNAL MEDICINE

## 2022-03-02 PROCEDURE — G0378 HOSPITAL OBSERVATION PER HR: HCPCS

## 2022-03-02 PROCEDURE — 63710000001 POTASSIUM CHLORIDE 10 MEQ CAPSULE CONTROLLED-RELEASE: Performed by: INTERNAL MEDICINE

## 2022-03-02 PROCEDURE — 25010000002 MIDAZOLAM PER 1 MG: Performed by: INTERNAL MEDICINE

## 2022-03-02 PROCEDURE — 71045 X-RAY EXAM CHEST 1 VIEW: CPT

## 2022-03-02 PROCEDURE — 63710000001 ASPIRIN 81 MG TABLET DELAYED-RELEASE: Performed by: INTERNAL MEDICINE

## 2022-03-02 PROCEDURE — 63710000001 METOPROLOL SUCCINATE XL 25 MG TABLET SUSTAINED-RELEASE 24 HOUR: Performed by: INTERNAL MEDICINE

## 2022-03-02 PROCEDURE — C1898 LEAD, PMKR, OTHER THAN TRANS: HCPCS | Performed by: INTERNAL MEDICINE

## 2022-03-02 PROCEDURE — 63710000001 SPIRONOLACTONE 25 MG TABLET: Performed by: INTERNAL MEDICINE

## 2022-03-02 PROCEDURE — C1895 LEAD, AICD, ENDO DUAL COIL: HCPCS | Performed by: INTERNAL MEDICINE

## 2022-03-02 DEVICE — PACE/SENSE LEAD
Type: IMPLANTABLE DEVICE | Status: FUNCTIONAL
Brand: INGEVITY™+

## 2022-03-02 DEVICE — IMPLANTABLE CARDIOVERTER DEFIBRILLATOR DR
Type: IMPLANTABLE DEVICE | Site: CHEST WALL | Status: FUNCTIONAL
Brand: VIGILANT™ EL ICD DR

## 2022-03-02 DEVICE — INTEGRATED BIPOLAR PACE/SENSE AND DEFIBRILLATION LEAD
Type: IMPLANTABLE DEVICE | Status: FUNCTIONAL
Brand: RELIANCE 4-FRONT™

## 2022-03-02 RX ORDER — DIPHENHYDRAMINE HCL 25 MG
25 CAPSULE ORAL NIGHTLY PRN
Status: DISCONTINUED | OUTPATIENT
Start: 2022-03-02 | End: 2022-03-03 | Stop reason: HOSPADM

## 2022-03-02 RX ORDER — SODIUM CHLORIDE 0.9 % (FLUSH) 0.9 %
3 SYRINGE (ML) INJECTION EVERY 12 HOURS SCHEDULED
Status: DISCONTINUED | OUTPATIENT
Start: 2022-03-02 | End: 2022-03-02 | Stop reason: HOSPADM

## 2022-03-02 RX ORDER — POTASSIUM CHLORIDE 750 MG/1
10 CAPSULE, EXTENDED RELEASE ORAL DAILY
Status: DISCONTINUED | OUTPATIENT
Start: 2022-03-02 | End: 2022-03-03 | Stop reason: HOSPADM

## 2022-03-02 RX ORDER — DIPHENHYDRAMINE HYDROCHLORIDE 50 MG/ML
INJECTION INTRAMUSCULAR; INTRAVENOUS AS NEEDED
Status: DISCONTINUED | OUTPATIENT
Start: 2022-03-02 | End: 2022-03-02 | Stop reason: HOSPADM

## 2022-03-02 RX ORDER — OXYCODONE HYDROCHLORIDE 5 MG/1
5 TABLET ORAL 4 TIMES DAILY PRN
Status: DISCONTINUED | OUTPATIENT
Start: 2022-03-02 | End: 2022-03-03 | Stop reason: HOSPADM

## 2022-03-02 RX ORDER — MIDAZOLAM HYDROCHLORIDE 1 MG/ML
INJECTION INTRAMUSCULAR; INTRAVENOUS AS NEEDED
Status: DISCONTINUED | OUTPATIENT
Start: 2022-03-02 | End: 2022-03-02 | Stop reason: HOSPADM

## 2022-03-02 RX ORDER — FUROSEMIDE 20 MG/1
20 TABLET ORAL DAILY PRN
Status: DISCONTINUED | OUTPATIENT
Start: 2022-03-02 | End: 2022-03-03 | Stop reason: HOSPADM

## 2022-03-02 RX ORDER — ONDANSETRON 2 MG/ML
4 INJECTION INTRAMUSCULAR; INTRAVENOUS EVERY 6 HOURS PRN
Status: DISCONTINUED | OUTPATIENT
Start: 2022-03-02 | End: 2022-03-02 | Stop reason: HOSPADM

## 2022-03-02 RX ORDER — SODIUM CHLORIDE 0.9 % (FLUSH) 0.9 %
3-10 SYRINGE (ML) INJECTION AS NEEDED
Status: DISCONTINUED | OUTPATIENT
Start: 2022-03-02 | End: 2022-03-03 | Stop reason: HOSPADM

## 2022-03-02 RX ORDER — ONDANSETRON 2 MG/ML
4 INJECTION INTRAMUSCULAR; INTRAVENOUS EVERY 6 HOURS PRN
Status: DISCONTINUED | OUTPATIENT
Start: 2022-03-02 | End: 2022-03-03 | Stop reason: HOSPADM

## 2022-03-02 RX ORDER — ACETAMINOPHEN 325 MG/1
650 TABLET ORAL EVERY 4 HOURS PRN
Status: DISCONTINUED | OUTPATIENT
Start: 2022-03-02 | End: 2022-03-02 | Stop reason: HOSPADM

## 2022-03-02 RX ORDER — DIPHENHYDRAMINE HYDROCHLORIDE 50 MG/ML
25 INJECTION INTRAMUSCULAR; INTRAVENOUS NIGHTLY PRN
Status: DISCONTINUED | OUTPATIENT
Start: 2022-03-02 | End: 2022-03-03 | Stop reason: HOSPADM

## 2022-03-02 RX ORDER — ASPIRIN 81 MG/1
81 TABLET ORAL DAILY
Status: DISCONTINUED | OUTPATIENT
Start: 2022-03-02 | End: 2022-03-03 | Stop reason: HOSPADM

## 2022-03-02 RX ORDER — SODIUM CHLORIDE 0.9 % (FLUSH) 0.9 %
10 SYRINGE (ML) INJECTION AS NEEDED
Status: DISCONTINUED | OUTPATIENT
Start: 2022-03-02 | End: 2022-03-02 | Stop reason: HOSPADM

## 2022-03-02 RX ORDER — CEFAZOLIN SODIUM 1 G/50ML
1 INJECTION, SOLUTION INTRAVENOUS ONCE
Status: DISCONTINUED | OUTPATIENT
Start: 2022-03-02 | End: 2022-03-02 | Stop reason: HOSPADM

## 2022-03-02 RX ORDER — LIDOCAINE HYDROCHLORIDE 20 MG/ML
INJECTION, SOLUTION INFILTRATION; PERINEURAL AS NEEDED
Status: DISCONTINUED | OUTPATIENT
Start: 2022-03-02 | End: 2022-03-02 | Stop reason: HOSPADM

## 2022-03-02 RX ORDER — LOSARTAN POTASSIUM 50 MG/1
25 TABLET ORAL DAILY
Status: DISCONTINUED | OUTPATIENT
Start: 2022-03-02 | End: 2022-03-03 | Stop reason: HOSPADM

## 2022-03-02 RX ORDER — MAGNESIUM HYDROXIDE 1200 MG/15ML
LIQUID ORAL AS NEEDED
Status: DISCONTINUED | OUTPATIENT
Start: 2022-03-02 | End: 2022-03-02 | Stop reason: HOSPADM

## 2022-03-02 RX ORDER — NAPROXEN SODIUM 220 MG
220 TABLET ORAL DAILY
COMMUNITY
End: 2022-03-03 | Stop reason: HOSPADM

## 2022-03-02 RX ORDER — SPIRONOLACTONE 25 MG/1
25 TABLET ORAL DAILY
Status: DISCONTINUED | OUTPATIENT
Start: 2022-03-02 | End: 2022-03-03 | Stop reason: HOSPADM

## 2022-03-02 RX ORDER — METOPROLOL SUCCINATE 25 MG/1
25 TABLET, EXTENDED RELEASE ORAL DAILY
Status: DISCONTINUED | OUTPATIENT
Start: 2022-03-02 | End: 2022-03-03 | Stop reason: HOSPADM

## 2022-03-02 RX ORDER — SODIUM CHLORIDE 0.9 % (FLUSH) 0.9 %
3 SYRINGE (ML) INJECTION EVERY 12 HOURS SCHEDULED
Status: DISCONTINUED | OUTPATIENT
Start: 2022-03-02 | End: 2022-03-03 | Stop reason: HOSPADM

## 2022-03-02 RX ORDER — ATORVASTATIN CALCIUM 10 MG/1
10 TABLET, FILM COATED ORAL DAILY
COMMUNITY
End: 2022-09-29 | Stop reason: SDUPTHER

## 2022-03-02 RX ORDER — FENTANYL CITRATE 50 UG/ML
INJECTION, SOLUTION INTRAMUSCULAR; INTRAVENOUS AS NEEDED
Status: DISCONTINUED | OUTPATIENT
Start: 2022-03-02 | End: 2022-03-02 | Stop reason: HOSPADM

## 2022-03-02 RX ORDER — SENNOSIDES 8.6 MG
650 CAPSULE ORAL DAILY
COMMUNITY

## 2022-03-02 RX ADMIN — ONDANSETRON HYDROCHLORIDE 4 MG: 2 SOLUTION INTRAMUSCULAR; INTRAVENOUS at 21:11

## 2022-03-02 RX ADMIN — SPIRONOLACTONE 25 MG: 25 TABLET ORAL at 18:38

## 2022-03-02 RX ADMIN — Medication 3 ML: at 20:12

## 2022-03-02 RX ADMIN — ASPIRIN 81 MG: 81 TABLET ORAL at 18:45

## 2022-03-02 RX ADMIN — POTASSIUM CHLORIDE 10 MEQ: 10 CAPSULE, COATED, EXTENDED RELEASE ORAL at 18:45

## 2022-03-02 RX ADMIN — METOPROLOL SUCCINATE 25 MG: 25 TABLET, EXTENDED RELEASE ORAL at 18:38

## 2022-03-02 RX ADMIN — Medication 1 APPLICATION: at 20:11

## 2022-03-02 RX ADMIN — OXYCODONE HYDROCHLORIDE 5 MG: 5 TABLET ORAL at 20:11

## 2022-03-03 VITALS
OXYGEN SATURATION: 91 % | SYSTOLIC BLOOD PRESSURE: 131 MMHG | HEIGHT: 62 IN | RESPIRATION RATE: 18 BRPM | HEART RATE: 73 BPM | DIASTOLIC BLOOD PRESSURE: 78 MMHG | BODY MASS INDEX: 21.94 KG/M2 | WEIGHT: 119.2 LBS | TEMPERATURE: 98 F

## 2022-03-03 LAB
QT INTERVAL: 426 MS
QTC INTERVAL: 479 MS

## 2022-03-03 PROCEDURE — A9270 NON-COVERED ITEM OR SERVICE: HCPCS | Performed by: INTERNAL MEDICINE

## 2022-03-03 PROCEDURE — 93005 ELECTROCARDIOGRAM TRACING: CPT | Performed by: INTERNAL MEDICINE

## 2022-03-03 PROCEDURE — 63710000001 MUPIROCIN 2 % OINTMENT: Performed by: INTERNAL MEDICINE

## 2022-03-03 PROCEDURE — 93010 ELECTROCARDIOGRAM REPORT: CPT | Performed by: INTERNAL MEDICINE

## 2022-03-03 PROCEDURE — 99024 POSTOP FOLLOW-UP VISIT: CPT | Performed by: INTERNAL MEDICINE

## 2022-03-03 PROCEDURE — 63710000001 SPIRONOLACTONE 25 MG TABLET: Performed by: INTERNAL MEDICINE

## 2022-03-03 PROCEDURE — 63710000001 METOPROLOL SUCCINATE XL 25 MG TABLET SUSTAINED-RELEASE 24 HOUR: Performed by: INTERNAL MEDICINE

## 2022-03-03 PROCEDURE — 63710000001 ASPIRIN 81 MG TABLET DELAYED-RELEASE: Performed by: INTERNAL MEDICINE

## 2022-03-03 PROCEDURE — 63710000001 POTASSIUM CHLORIDE 10 MEQ CAPSULE CONTROLLED-RELEASE: Performed by: INTERNAL MEDICINE

## 2022-03-03 PROCEDURE — 63710000001 OXYCODONE 5 MG TABLET: Performed by: INTERNAL MEDICINE

## 2022-03-03 PROCEDURE — 63710000001 LOSARTAN 50 MG TABLET: Performed by: INTERNAL MEDICINE

## 2022-03-03 RX ADMIN — ASPIRIN 81 MG: 81 TABLET ORAL at 08:51

## 2022-03-03 RX ADMIN — SPIRONOLACTONE 25 MG: 25 TABLET ORAL at 08:46

## 2022-03-03 RX ADMIN — Medication 3 ML: at 08:55

## 2022-03-03 RX ADMIN — Medication 1 APPLICATION: at 08:51

## 2022-03-03 RX ADMIN — METOPROLOL SUCCINATE 25 MG: 25 TABLET, EXTENDED RELEASE ORAL at 08:47

## 2022-03-03 RX ADMIN — POTASSIUM CHLORIDE 10 MEQ: 10 CAPSULE, COATED, EXTENDED RELEASE ORAL at 08:47

## 2022-03-03 RX ADMIN — LOSARTAN POTASSIUM 25 MG: 50 TABLET, FILM COATED ORAL at 08:47

## 2022-03-03 RX ADMIN — OXYCODONE HYDROCHLORIDE 5 MG: 5 TABLET ORAL at 07:02

## 2022-03-03 RX ADMIN — OXYCODONE HYDROCHLORIDE 5 MG: 5 TABLET ORAL at 00:53

## 2022-03-03 NOTE — NURSING NOTE
Pt prepared for discharge, PIV removed, cath intact, bleeding controlled, pt hellen well.    EDU provided to pt and daughter-in-law, Laurie, at beside, questions answered, verbal understanding provided. Pt escorted to lobby via W/C with personal belongings and ICD supplies without incident.

## 2022-03-03 NOTE — DISCHARGE INSTRUCTIONS
No tub baths or hot tubs for 2 weeks  Wear sling for 2 weeks  Observe incision for inflammation / infection - redness, worsening swelling or pain, drainage

## 2022-03-03 NOTE — DISCHARGE SUMMARY
[facility] DISCHARGE    Date of Discharge:  3/3/2022    Discharge Diagnosis: S/P PACER / AICE    Presenting Problem/History of Present Illness  Chronic systolic heart failure (HCC) [I50.22]    Admitted for AICD placement    Hospital Course  Patient is a 76 y.o. female presented with a severe dilated cardiomyopathy and cl 3c chf. The pacer/aicd wasl placed HD#1 without complications. She had held her Lasix that am because of the procedure and had some evidence of volume overload. With re-institution of the lasix she had good diuresis and appears euvolemic on day of discharge. Her home meds were continued x Aleve was stopped. Her pacer interrogation at discharge is very good, post-procedure CXR is good and the incision is dry without evidence of a significant hematoma..      Procedures Performed  Procedure(s):  ICD DC new       Consults:   Consults     No orders found for last 30 day(s).          Pertinent Test Results: radiology: X-Ray: shows cardiomegaly with some fluid overload    Ejection Fraction  21-25%    Echo EF Estimated  No results found for: ECHOEFEST    Nuclear Stress Ejection Fraction  No components found for: NUCEF    Cath Ejection Fraction Quantitative  No results found for: CATHEF    Condition on Discharge:  Stable / improved    Physical Exam at Discharge    Vital Signs  Temp:  [97.4 °F (36.3 °C)-98.1 °F (36.7 °C)] 98 °F (36.7 °C)  Heart Rate:  [] 73  Resp:  [16-23] 18  BP: (122-143)/(78-93) 131/78    Physical Exam:   No exam performed today,    Past Medical History:   Diagnosis Date   • Broken femur (HCC)    • Fracture of hip (HCC)     left, due to lymphoma, treated non-operatively   • Hypertension    • Lymphoma (HCC)        Discharge Disposition  Home or Self Care    Discharge Medications     Discharge Medications      New Medications      Instructions Start Date   mupirocin 2 % nasal ointment  Commonly known as: Bactroban Nasal   Nasal, 2 Times Daily         Continue  These Medications      Instructions Start Date   aspirin 81 MG EC tablet   81 mg, Oral, Daily      atorvastatin 10 MG tablet  Commonly known as: LIPITOR   10 mg, Oral, Daily      escitalopram 10 MG tablet  Commonly known as: LEXAPRO   TAKE 1 TABLET BY MOUTH EVERY DAY      furosemide 20 MG tablet  Commonly known as: LASIX   20 mg, Oral, Daily PRN      lidocaine 5 %  Commonly known as: LIDODERM   2 patches, Transdermal, Every 24 Hours Scheduled, Remove & Discard patch within 12 hours or as directed by MD      losartan 25 MG tablet  Commonly known as: Cozaar   25 mg, Oral, Daily      metoprolol succinate XL 25 MG 24 hr tablet  Commonly known as: TOPROL-XL   25 mg, Oral, Daily      oxyCODONE 15 MG immediate release tablet  Commonly known as: ROXICODONE   15 mg, Oral, 4 Times Daily PRN      potassium chloride 10 MEQ CR tablet   10 mEq, Oral, Daily      spironolactone 25 MG tablet  Commonly known as: ALDACTONE   25 mg, Oral, Daily      Tylenol 8 Hour 650 MG 8 hr tablet  Generic drug: acetaminophen   650 mg, Oral, Daily      vitamin D 1.25 MG (33245 UT) capsule capsule  Commonly known as: ERGOCALCIFEROL   50,000 Units, Oral, Every 30 Days         Stop These Medications    naproxen sodium 220 MG tablet  Commonly known as: ALEVE            Discharge Diet:     Activity at Discharge:     Follow-up Appointments  Future Appointments   Date Time Provider Department Center   3/18/2022  9:45 AM Romeo Shaffer APRN [facility]   4/1/2022  9:30 AM [facility]   4/4/2022  1:00 PM Austin Gordillo MD [facility]   4/7/2022 10:30 AM Rani Garza DO [facility]         Test Results Pending at Discharge - none       Austin Gordillo MD  03/03/22  09:04 CST

## 2022-03-08 ENCOUNTER — OFFICE VISIT (OUTPATIENT)
Dept: INTERNAL MEDICINE | Facility: CLINIC | Age: 77
End: 2022-03-08

## 2022-03-08 VITALS
SYSTOLIC BLOOD PRESSURE: 128 MMHG | HEIGHT: 62 IN | OXYGEN SATURATION: 98 % | BODY MASS INDEX: 21.31 KG/M2 | WEIGHT: 115.8 LBS | DIASTOLIC BLOOD PRESSURE: 74 MMHG | TEMPERATURE: 97.1 F | HEART RATE: 94 BPM

## 2022-03-08 DIAGNOSIS — Z09 HOSPITAL DISCHARGE FOLLOW-UP: Primary | ICD-10-CM

## 2022-03-08 DIAGNOSIS — R60.0 BILATERAL LOWER EXTREMITY EDEMA: ICD-10-CM

## 2022-03-08 DIAGNOSIS — I42.0 DILATED CARDIOMYOPATHY: ICD-10-CM

## 2022-03-08 DIAGNOSIS — I50.22 CHRONIC SYSTOLIC HEART FAILURE: ICD-10-CM

## 2022-03-08 PROCEDURE — 99214 OFFICE O/P EST MOD 30 MIN: CPT | Performed by: NURSE PRACTITIONER

## 2022-03-08 NOTE — PROGRESS NOTES
Subjective   Mitzi Villafuerte is a 76 y.o. female.   Chief Complaint   Patient presents with   • Hospital Follow Up Visit     Chronic systolic failure       Mitzi presents today for hospital follow up after having AICD placement  There were no complications regardint the procedure.  Since discharge she has been eating and drinking without difficulty.  She denies chest pain.  She has been monitoring daily weights and denies significant change in weight.  She does complain of continued lower extremity edema.  She has Lasix 20mg she uses PRN.  She has compression hose at home but has not been using them.  During they day she typically sits on the cough with her legs sitting on the couch.  She has follow up with cardiology on the 18th of this month.         The following portions of the patient's history were reviewed and updated as appropriate: allergies, current medications, past family history, past medical history, past social history, past surgical history and problem list.    Review of Systems   Constitutional: Negative for activity change, appetite change, fatigue, fever, unexpected weight gain and unexpected weight loss.   HENT: Negative for swollen glands, trouble swallowing and voice change.    Eyes: Negative for blurred vision and visual disturbance.   Respiratory: Negative for cough and shortness of breath.    Cardiovascular: Positive for leg swelling. Negative for chest pain and palpitations.   Gastrointestinal: Negative for abdominal pain, constipation, diarrhea, nausea, vomiting and indigestion.   Endocrine: Negative for cold intolerance, heat intolerance, polydipsia and polyphagia.   Genitourinary: Negative for dysuria and frequency.   Musculoskeletal: Negative for arthralgias, back pain, joint swelling and neck pain.   Skin: Negative for color change, rash and skin lesions.   Neurological: Negative for dizziness, weakness, headache, memory problem and confusion.   Hematological: Does not bruise/bleed  easily.   Psychiatric/Behavioral: Negative for agitation, hallucinations and suicidal ideas. The patient is not nervous/anxious.        Objective   Past Medical History:   Diagnosis Date   • Broken femur (HCC)    • Fracture of hip (HCC)     left, due to lymphoma, treated non-operatively   • Hypertension    • Lymphoma (HCC)       Past Surgical History:   Procedure Laterality Date   • CARDIAC ELECTROPHYSIOLOGY PROCEDURE N/A 3/2/2022    Procedure: ICD DC new;  Surgeon: Austin Gordillo MD;  Location:  [facility];  Service: Cardiology;  Laterality: N/A;   • CHOLECYSTECTOMY     • HERNIA REPAIR     • HIP TROCHANTERIC NAILING WITH INTRAMEDULLARY HIP SCREW Left 2/2/2017    Procedure: HIP TROCANTERIC NAILING LONG WITH INTRAMEDULLARY HIP SCREW WITH CAST APPLICATION TO LEFT HAND;  Surgeon: Beni Culp MD;  Location:  [facility] OR;  Service:         Current Outpatient Medications:   •  acetaminophen (TYLENOL) 650 MG 8 hr tablet, Take 650 mg by mouth Daily., Disp: , Rfl:   •  aspirin (aspirin) 81 MG EC tablet, Take 1 tablet by mouth Daily., Disp: 30 tablet, Rfl: 0  •  atorvastatin (LIPITOR) 10 MG tablet, Take 10 mg by mouth Daily., Disp: , Rfl:   •  escitalopram (LEXAPRO) 10 MG tablet, TAKE 1 TABLET BY MOUTH EVERY DAY, Disp: 90 tablet, Rfl: 3  •  furosemide (LASIX) 20 MG tablet, Take 1 tablet by mouth Daily As Needed (for wt gain of 2 lbs or more from the previous morning)., Disp: 15 tablet, Rfl: 3  •  lidocaine (LIDODERM) 5 %, Place 2 patches on the skin as directed by provider Daily. Remove & Discard patch within 12 hours or as directed by MD, Disp: 30 patch, Rfl: 0  •  losartan (Cozaar) 25 MG tablet, Take 1 tablet by mouth Daily., Disp: 90 tablet, Rfl: 3  •  metoprolol succinate XL (TOPROL-XL) 25 MG 24 hr tablet, Take 1 tablet by mouth Daily., Disp: 90 tablet, Rfl: 3  •  oxyCODONE (ROXICODONE) 15 MG immediate release tablet, Take 15 mg by mouth 4 (Four) Times a Day As Needed., Disp: , Rfl:   •   potassium chloride 10 MEQ CR tablet, Take 1 tablet by mouth Daily., Disp: 7 tablet, Rfl: 0  •  spironolactone (ALDACTONE) 25 MG tablet, Take 1 tablet by mouth Daily., Disp: 30 tablet, Rfl: 11  •  vitamin D (ERGOCALCIFEROL) 1.25 MG (93655 UT) capsule capsule, Take 1 capsule by mouth Every 30 (Thirty) Days., Disp: 3 capsule, Rfl: 3      Vitals:    22 1118   BP: 128/74   Pulse: 94   Temp: 97.1 °F (36.2 °C)   SpO2: 98%         22  1118   Weight: 52.5 kg (115 lb 12.8 oz)       Body mass index is 21.18 kg/m².    Physical Exam  Constitutional:       General: She is not in acute distress.     Appearance: She is well-developed.   HENT:      Head: Normocephalic.      Right Ear: Tympanic membrane and external ear normal.      Left Ear: Tympanic membrane and external ear normal.      Mouth/Throat:      Mouth: Mucous membranes are moist. No oral lesions.      Pharynx: Oropharynx is clear.   Eyes:      Conjunctiva/sclera: Conjunctivae normal.   Cardiovascular:      Rate and Rhythm: Normal rate and regular rhythm.      Pulses: Normal pulses.      Heart sounds: Normal heart sounds.   Pulmonary:      Effort: Pulmonary effort is normal.      Breath sounds: Normal breath sounds.   Chest:       Musculoskeletal:      Right lower le+ Pitting Edema present.      Left lower le+ Pitting Edema present.   Skin:     General: Skin is warm and dry.   Neurological:      Mental Status: She is alert and oriented to person, place, and time.      Gait: Gait normal.   Psychiatric:         Mood and Affect: Mood normal.         Speech: Speech normal.         Behavior: Behavior normal.         Thought Content: Thought content normal.               Assessment/Plan   Diagnoses and all orders for this visit:    1. Hospital discharge follow-up (Primary)    2. Bilateral lower extremity edema    3. Dilated cardiomyopathy (HCC)    4. Chronic systolic heart failure (HCC)      No complications during procedure and since discharge Mitzi has  been feeling well.  In regards to her edema, I have encouraged she wear her compression hose daily, applying in the morning and removing in the evening.  While watching TV, needs to elevated legs ankle above the knee.  Use Lasix as needed for weight gain >2 pounds as her cardiology recommendation.  Explained effects of Lasix on renal function.  Keep follow up with cardiology on 3/18/22.  Discussed surgical wound monitoring and signs of infection.    I've reviewed labs and imaging associated with his admission.   I have reviewed medications at discharge

## 2022-03-26 DIAGNOSIS — I42.9 CARDIOMYOPATHY, UNSPECIFIED TYPE: ICD-10-CM

## 2022-03-28 RX ORDER — FUROSEMIDE 20 MG/1
TABLET ORAL
Qty: 15 TABLET | Refills: 3 | Status: SHIPPED | OUTPATIENT
Start: 2022-03-28

## 2022-03-29 ENCOUNTER — OFFICE VISIT (OUTPATIENT)
Dept: CARDIOLOGY | Facility: CLINIC | Age: 77
End: 2022-03-29

## 2022-03-29 VITALS
HEART RATE: 101 BPM | WEIGHT: 115 LBS | DIASTOLIC BLOOD PRESSURE: 84 MMHG | BODY MASS INDEX: 21.16 KG/M2 | OXYGEN SATURATION: 93 % | SYSTOLIC BLOOD PRESSURE: 124 MMHG | HEIGHT: 62 IN

## 2022-03-29 DIAGNOSIS — R00.0 TACHYCARDIA: ICD-10-CM

## 2022-03-29 DIAGNOSIS — Z95.810 AICD (AUTOMATIC CARDIOVERTER/DEFIBRILLATOR) PRESENT: ICD-10-CM

## 2022-03-29 DIAGNOSIS — E78.2 MIXED HYPERLIPIDEMIA: ICD-10-CM

## 2022-03-29 DIAGNOSIS — I50.22 CHRONIC SYSTOLIC CONGESTIVE HEART FAILURE, NYHA CLASS 2: Primary | ICD-10-CM

## 2022-03-29 DIAGNOSIS — M79.89 LEG SWELLING: ICD-10-CM

## 2022-03-29 DIAGNOSIS — I10 HYPERTENSION, BENIGN: ICD-10-CM

## 2022-03-29 PROCEDURE — 99024 POSTOP FOLLOW-UP VISIT: CPT | Performed by: NURSE PRACTITIONER

## 2022-03-29 NOTE — PROGRESS NOTES
"Subjective:     Encounter Date:03/29/2022      Patient ID: Mitzi Villafuerte is a 76 y.o. female     Chief Complaint:  History of Present Illness  Patient presents today for leg swelling. Patient notes over the last few weeks she has had increase in leg swelling. Over the last few days it has gotten worse. She notes one day last week she took an extra 10 mg of Lasix and had good response with urine output and leg swelling improved. She notes \"she was too nervous to continue to take anymore.\" She notes she has had a weight gain over the last few days of 3-5 pounds. She denies shortness of breath- she actually notes this is getting better. Of note patient was referred to our office in January for cardiomyopathy. She had previously had cardiomyopathy following chemo treatment. She has also noted to have tachycardia. She had an AICD placed earlier this month and tolerated well. Site appears well healed. Last echo showed LVEF 21-25%, severe pulmonary hypertension, dilated RA cavity, moderate TR. I started her on aldactone 2/23/22- but she is unsure whether she is taking this medication. While in the hospital she was treated with extra lasix with good response. She was advised at that time to stop aleve.     The following portions of the patient's history were reviewed and updated as appropriate: allergies, current medications, past medical history, past social history, past and problem list.    Allergies   Allergen Reactions   • Phenergan [Promethazine Hcl]    • Promethazine Unknown - High Severity       Current Outpatient Medications:   •  acetaminophen (TYLENOL) 650 MG 8 hr tablet, Take 650 mg by mouth Daily., Disp: , Rfl:   •  aspirin (aspirin) 81 MG EC tablet, Take 1 tablet by mouth Daily., Disp: 30 tablet, Rfl: 0  •  escitalopram (LEXAPRO) 10 MG tablet, TAKE 1 TABLET BY MOUTH EVERY DAY, Disp: 90 tablet, Rfl: 3  •  furosemide (LASIX) 20 MG tablet, TAKE ONE TABLET BY MOUTH ONCE DAILY AS NEEDED FOR WEIGHT GAIN OF 2 " POUNDS OR MORE FROM THE PREVIOUS MORNING, Disp: 15 tablet, Rfl: 3  •  lidocaine (LIDODERM) 5 %, Place 2 patches on the skin as directed by provider Daily. Remove & Discard patch within 12 hours or as directed by MD, Disp: 30 patch, Rfl: 0  •  losartan (Cozaar) 25 MG tablet, Take 1 tablet by mouth Daily., Disp: 90 tablet, Rfl: 3  •  metoprolol succinate XL (TOPROL-XL) 25 MG 24 hr tablet, Take 1 tablet by mouth Daily., Disp: 90 tablet, Rfl: 3  •  oxyCODONE (ROXICODONE) 15 MG immediate release tablet, Take 15 mg by mouth 4 (Four) Times a Day As Needed., Disp: , Rfl:   •  potassium chloride 10 MEQ CR tablet, Take 1 tablet by mouth Daily., Disp: 7 tablet, Rfl: 0  •  spironolactone (ALDACTONE) 25 MG tablet, Take 1 tablet by mouth Daily., Disp: 30 tablet, Rfl: 11  •  vitamin D (ERGOCALCIFEROL) 1.25 MG (45276 UT) capsule capsule, Take 1 capsule by mouth Every 30 (Thirty) Days., Disp: 3 capsule, Rfl: 3  •  atorvastatin (LIPITOR) 10 MG tablet, Take 10 mg by mouth Daily., Disp: , Rfl:     Social History     Socioeconomic History   • Marital status: Single   Tobacco Use   • Smoking status: Former Smoker   • Smokeless tobacco: Never Used   • Tobacco comment: quit 55 years ago   Vaping Use   • Vaping Use: Never used   Substance and Sexual Activity   • Alcohol use: No   • Drug use: No   • Sexual activity: Defer       Review of Systems   Constitutional: Positive for malaise/fatigue. Negative for chills, decreased appetite, fever, weight gain and weight loss.   HENT: Negative for nosebleeds.    Eyes: Negative for visual disturbance.   Cardiovascular: Positive for leg swelling. Negative for chest pain, dyspnea on exertion, near-syncope, orthopnea, palpitations, paroxysmal nocturnal dyspnea and syncope.   Respiratory: Negative for cough, hemoptysis, shortness of breath and snoring.    Endocrine: Negative for cold intolerance and heat intolerance.   Hematologic/Lymphatic: Negative for bleeding problem. Does not bruise/bleed easily.  "  Skin: Negative for rash.   Musculoskeletal: Negative for back pain and falls.   Gastrointestinal: Negative for abdominal pain, constipation, diarrhea, heartburn, melena, nausea and vomiting.   Genitourinary: Negative for hematuria.   Neurological: Negative for dizziness, headaches and light-headedness.   Psychiatric/Behavioral: Negative for altered mental status.   Allergic/Immunologic: Negative for persistent infections.              Objective:     Vitals reviewed.   Constitutional:       Appearance: Not in distress. Frail. Chronically ill-appearing.   Eyes:      Pupils: Pupils are equal, round, and reactive to light.   HENT:      Head: Normocephalic and atraumatic.      Nose: Nose normal.    Mouth/Throat:      Dentition: Normal.   Pulmonary:      Effort: Pulmonary effort is normal.      Breath sounds: Normal breath sounds.   Chest:      Chest wall: Not tender to palpatation.   Cardiovascular:      PMI at left midclavicular line. Tachycardia present. Regular rhythm.      Murmurs: There is no murmur.   Pulses:     Intact distal pulses.   Edema:     Peripheral edema present.     Ankle: bilateral 2+ pitting edema of the ankle.  Abdominal:      General: Bowel sounds are normal.      Palpations: Abdomen is soft.   Musculoskeletal: Normal range of motion.      Cervical back: Normal range of motion. Skin:     General: Skin is warm and dry.   Neurological:      Mental Status: Alert, oriented to person, place, and time and oriented to person, place and time.   Psychiatric:         Attention and Perception: Attention normal.         Mood and Affect: Mood normal.           Procedures  /84   Pulse 101   Ht 157.5 cm (62\")   Wt 52.2 kg (115 lb)   LMP  (LMP Unknown)   SpO2 93%   BMI 21.03 kg/m²   Lab Review:   I have reviewed       Lab Results   Component Value Date    CHOL 118 11/25/2021    CHLPL 165 04/12/2021    TRIG 117 11/25/2021    HDL 28 (L) 11/25/2021    LDL 68 11/25/2021      Results for orders placed in " visit on 02/15/22    Adult Transthoracic Echo Complete W/ Cont if Necessary Per Protocol    Interpretation Summary  · The left ventricular cavity is moderately dilated.  · Estimated right ventricular systolic pressure from tricuspid regurgitation is markedly elevated (>55 mmHg).  · Severe pulmonary hypertension is present.  · The right atrial cavity is moderately dilated.  · Left ventricular diastolic function is consistent with (grade Ia w/high LAP) impaired relaxation.  · Left ventricular ejection fraction appears to be 21 - 25%. Left ventricular systolic function is severely decreased.  · Moderate tricuspid valve regurgitation is present.  · The right ventricular cavity is mildly dilated.    SEVERE DILATED CARDIOMYOPATHY     Assessment:          Diagnosis Plan   1. Chronic systolic congestive heart failure, NYHA class 2 ([facility])     2. Leg swelling     3. Hypertension, benign     4. Mixed hyperlipidemia     5. Tachycardia     6. AICD (automatic cardioverter/defibrillator) present            Plan:       1. Chronic Systolic Congestive Heart Failure- 2+ pedal edema and weight gain- without change in shortness of breath. Suspect volume overload. Will increase Lasix to 40 mg daily until follow up appointment with Dr. Gordillo. Will not adjust any other medications at this time. She will bring accurate medication list to follow up. She is unsure if she is on aldactone. Needs better heart rate control as it is 100-115 today in office. Will not increase Toprol as there is concern for volume overload. Could consider Entresto, SGL2 and increasing Toprol at follow up. Low Salt diet. Daily weights.   2. Leg swelling- elevate legs and compress when able. I do suspect she has some degree of chronic leg edema- even when volume overloaded. Will increase Lasix to 40 until follow up Monday.   3. Hypertension- blood pressure stable.   4. Hyperlipdiemia- managed by PCP. On Lipitor  5. Tachycardic- remains with elevated heart rate. May  need to increase Beta blocker or consider corlanor. Device interrogation at next appt scheduled.   6. AICD - site well healed. Device RN out of office today. Interrogation scheduled and follow up with Dr. Gordillo on Monday            I spent 32 minutes caring for Mitzi on this date of service. This time includes time spent by me in the following activities:preparing for the visit, reviewing tests, obtaining and/or reviewing a separately obtained history, performing a medically appropriate examination and/or evaluation , counseling and educating the patient/family/caregiver, ordering medications, tests, or procedures, referring and communicating with other health care professionals , documenting information in the medical record, independently interpreting results and communicating that information with the patient/family/caregiver and care coordination

## 2022-04-04 ENCOUNTER — CLINICAL SUPPORT NO REQUIREMENTS (OUTPATIENT)
Dept: CARDIOLOGY | Facility: CLINIC | Age: 77
End: 2022-04-04

## 2022-04-04 ENCOUNTER — OFFICE VISIT (OUTPATIENT)
Dept: CARDIOLOGY | Facility: CLINIC | Age: 77
End: 2022-04-04

## 2022-04-04 VITALS
BODY MASS INDEX: 19.32 KG/M2 | HEIGHT: 62 IN | WEIGHT: 105 LBS | HEART RATE: 111 BPM | SYSTOLIC BLOOD PRESSURE: 112 MMHG | OXYGEN SATURATION: 98 % | DIASTOLIC BLOOD PRESSURE: 62 MMHG

## 2022-04-04 DIAGNOSIS — I50.22 CHRONIC SYSTOLIC HEART FAILURE: ICD-10-CM

## 2022-04-04 DIAGNOSIS — E78.2 MIXED HYPERLIPIDEMIA: ICD-10-CM

## 2022-04-04 DIAGNOSIS — I10 HYPERTENSION, BENIGN: ICD-10-CM

## 2022-04-04 DIAGNOSIS — Z95.810 AICD (AUTOMATIC CARDIOVERTER/DEFIBRILLATOR) PRESENT: Primary | ICD-10-CM

## 2022-04-04 DIAGNOSIS — I42.0 DILATED CARDIOMYOPATHY: Primary | ICD-10-CM

## 2022-04-04 DIAGNOSIS — I42.0 DILATED CARDIOMYOPATHY: ICD-10-CM

## 2022-04-04 PROCEDURE — 99024 POSTOP FOLLOW-UP VISIT: CPT | Performed by: INTERNAL MEDICINE

## 2022-04-04 PROCEDURE — 93283 PRGRMG EVAL IMPLANTABLE DFB: CPT | Performed by: INTERNAL MEDICINE

## 2022-04-04 RX ORDER — METOPROLOL SUCCINATE 50 MG/1
50 TABLET, EXTENDED RELEASE ORAL DAILY
Qty: 30 TABLET | Refills: 11 | Status: SHIPPED | OUTPATIENT
Start: 2022-04-04 | End: 2022-05-04 | Stop reason: DRUGHIGH

## 2022-04-04 NOTE — PROGRESS NOTES
Subjective    Mitzi Villafuerte is a 76 y.o. female. Fu after dual chamber pacer-AICD with [employer]    History of Present Illness     DILATED CARDIOMYOPATHY:  This is severe but has improving stamina and improving leg edema. She has no soa at rest and no palpitations and no chest pain. EKG was not repeated today. She now has good energy and appetite and wt is coming down. She knows to avoid salt. She is weighing daily.    PACER-AICD 2/25/22:  No pain at the site. [employer] is very good at 3. No tachy tx needed so far.    The following portions of the patient's history were reviewed and updated as appropriate: allergies, current medications, past family history, past medical history, past social history, past surgical history and problem list.    Patient Active Problem List   Diagnosis   • Closed displaced comminuted fracture of shaft of left femur (HCC)   • Closed displaced fracture of shaft of third metacarpal bone of left hand   • B-cell lymphoma (HCC)   • Cardiomyopathy (HCC) EF 20-25%   • Chronic low back pain with left-sided sciatica   • Chronic pain disorder   • Chronic pain due to neoplasm   • Hip pain   • Hypertension, benign   • Lung mass   • Mixed hyperlipidemia   • Primary insomnia   • Vitamin D deficiency   • Dilated cardiomyopathy (HCC)   • Pleural effusion   • SOB (shortness of breath)   • Chronic systolic heart failure (HCC)   • Tachycardia       Allergies   Allergen Reactions   • Phenergan [Promethazine Hcl]    • Promethazine Unknown - High Severity       Family History   Problem Relation Age of Onset   • Heart failure Mother    • Liver disease Father    • Heart disease Sister        Social History     Socioeconomic History   • Marital status: Single   Tobacco Use   • Smoking status: Former Smoker   • Smokeless tobacco: Never Used   • Tobacco comment: quit 55 years ago   Vaping Use   • Vaping Use: Never used   Substance and Sexual Activity   • Alcohol use: No   • Drug use: No   • Sexual activity:  Defer         Current Outpatient Medications:   •  acetaminophen (TYLENOL) 650 MG 8 hr tablet, Take 650 mg by mouth Daily., Disp: , Rfl:   •  aspirin (aspirin) 81 MG EC tablet, Take 1 tablet by mouth Daily., Disp: 30 tablet, Rfl: 0  •  atorvastatin (LIPITOR) 10 MG tablet, Take 10 mg by mouth Daily., Disp: , Rfl:   •  escitalopram (LEXAPRO) 10 MG tablet, TAKE 1 TABLET BY MOUTH EVERY DAY, Disp: 90 tablet, Rfl: 3  •  furosemide (LASIX) 20 MG tablet, TAKE ONE TABLET BY MOUTH ONCE DAILY AS NEEDED FOR WEIGHT GAIN OF 2 POUNDS OR MORE FROM THE PREVIOUS MORNING, Disp: 15 tablet, Rfl: 3  •  lidocaine (LIDODERM) 5 %, Place 2 patches on the skin as directed by provider Daily. Remove & Discard patch within 12 hours or as directed by MD, Disp: 30 patch, Rfl: 0  •  losartan (Cozaar) 25 MG tablet, Take 1 tablet by mouth Daily., Disp: 90 tablet, Rfl: 3  •  oxyCODONE (ROXICODONE) 15 MG immediate release tablet, Take 15 mg by mouth 4 (Four) Times a Day As Needed., Disp: , Rfl:   •  potassium chloride 10 MEQ CR tablet, Take 1 tablet by mouth Daily., Disp: 7 tablet, Rfl: 0  •  spironolactone (ALDACTONE) 25 MG tablet, Take 1 tablet by mouth Daily., Disp: 30 tablet, Rfl: 11  •  vitamin D (ERGOCALCIFEROL) 1.25 MG (29777 UT) capsule capsule, Take 1 capsule by mouth Every 30 (Thirty) Days., Disp: 3 capsule, Rfl: 3  •  metoprolol succinate XL (TOPROL-XL) 50 MG 24 hr tablet, Take 1 tablet by mouth Daily., Disp: 30 tablet, Rfl: 11    Past Surgical History:   Procedure Laterality Date   • CARDIAC ELECTROPHYSIOLOGY PROCEDURE N/A 3/2/2022    Procedure: ICD DC new;  Surgeon: Austin Gordillo MD;  Location: [facility] CATH INVASIVE LOCATION;  Service: Cardiology;  Laterality: N/A;   • CHOLECYSTECTOMY     • HERNIA REPAIR     • HIP TROCHANTERIC NAILING WITH INTRAMEDULLARY HIP SCREW Left 2/2/2017    Procedure: HIP TROCANTERIC NAILING LONG WITH INTRAMEDULLARY HIP SCREW WITH CAST APPLICATION TO LEFT HAND;  Surgeon: Beni Culp MD;  Location:  "[facility];  Service:        Review of Systems   Constitutional: Negative for activity change, appetite change, fatigue and unexpected weight change.   Respiratory: Negative for shortness of breath.    Cardiovascular: Positive for leg swelling. Negative for chest pain and palpitations.   Gastrointestinal: Negative for abdominal pain and blood in stool.   Genitourinary: Negative for difficulty urinating and hematuria.       /62   Pulse 111   Ht 157.5 cm (62\")   Wt 47.6 kg (105 lb)   LMP  (LMP Unknown)   SpO2 98%   BMI 19.20 kg/m²   Procedures    Objective   Physical Exam  Constitutional:       Appearance: Normal appearance.   Cardiovascular:      Rate and Rhythm: Normal rate and regular rhythm.      Pulses: Normal pulses.      Heart sounds: Normal heart sounds. No murmur heard.    No friction rub. No gallop.   Pulmonary:      Effort: Pulmonary effort is normal.      Breath sounds: Normal breath sounds. No rales.   Abdominal:      General: Bowel sounds are normal.      Tenderness: There is no abdominal tenderness.   Musculoskeletal:      Right lower le+ Edema present.      Left lower le+ Edema present.   Skin:     General: Skin is warm and dry.   Neurological:      General: No focal deficit present.      Mental Status: She is oriented to person, place, and time.   Psychiatric:         Mood and Affect: Mood normal.         Behavior: Behavior normal.         Assessment/Plan   Diagnoses and all orders for this visit:    1. Dilated cardiomyopathy (HCC) (Primary)  Comments:  improving stamina  Orders:  -     metoprolol succinate XL (TOPROL-XL) 50 MG 24 hr tablet; Take 1 tablet by mouth Daily.  Dispense: 30 tablet; Refill: 11    2. Mixed hyperlipidemia  Comments:  on statin    3. Hypertension, benign  Comments:  good control    4. Chronic systolic heart failure (HCC)  Comments:  improved - increase Toprol to lower HR and close fu                 Return in about 4 weeks (around 2022).  No orders of the " defined types were placed in this encounter.

## 2022-04-05 NOTE — PROGRESS NOTES
Dual Chamber AICD Evaluation Report  IN OFFICE INTERROGATION    April 5, 2022    Primary Cardiologist: Rand  : Guidant Model: Vigilant EL ICD D233  Implant date: 3/2/2022    Reason for evaluation: new implant follow up  Indication for AICD: nonischemic cardiomyopathy    Measurements  Atrial sensing - P wave: 1.6 mV  Atrial threshold: 0.7 V @ 0.4 ms  Atrial lead impedance: 716 ohms  Ventricular sensing - R wave: >25 mV  Ventricular threshold: 0.5 V @ 0.4 ms  Ventricular lead impedance:  416 ohms  Shock impedance:  RV- 48 ohms    Manual sensing and threshold testing performed:  Yes      Diagnostic Data  Atrial paced: <1 %  Ventricular paced: <1 %    Episodes recorded since 3/2/22: NS-VT x 1, duration 4 seconds, rate 221 bpm.  No ATP or shocks.  SVT x 3, longest 6 seconds, rates 166-190 bpm.  AFL x 2, longest 3 seconds, burden <1%, not anticoagulated.    Incision:  Healed.  Well approximated without edema, erythema, warmth, open areas, or drainage of any kind.  Denies fevers since time of implant.    [employer] Heart Failure Index:  Current score is 3.  In office calibration performed.  Patient educated on monthly and PRN symptom checks if [employer] is elevated.  Verbalized understanding.    Battery status: satisfactory  Estimated 13 years remaining      Final Parameters  Mode: DDD  Lower rate: 60 bpm Upper rate: 120 bpm  AV Delay: Paced- 220-300 ms    Sensed- 220-300 ms     Atrial - Amplitude: 1.5 V Pulse width: 0.4 ms Sensitivity: 0.25 mV   Ventricular - Amplitude: 2 V Pulse width: 0.4 ms Sensitivity: 0.6 mV   Changes made: Normal ace atrial output changed to 1.5V; normal ace ventricular output changed to 2V; Both set to TREND  Conclusions: normal AICD function, no therapy delivered, stable pacing and sensing thresholds and adequate battery reserve    Follow up:  Monthly [employer] Heart Failure Index, every 3 months via latitude, annually in office.

## 2022-04-07 ENCOUNTER — OFFICE VISIT (OUTPATIENT)
Dept: INTERNAL MEDICINE | Facility: CLINIC | Age: 77
End: 2022-04-07

## 2022-04-07 VITALS
HEIGHT: 62 IN | TEMPERATURE: 97.5 F | OXYGEN SATURATION: 100 % | SYSTOLIC BLOOD PRESSURE: 102 MMHG | HEART RATE: 96 BPM | DIASTOLIC BLOOD PRESSURE: 68 MMHG | WEIGHT: 104.2 LBS | BODY MASS INDEX: 19.17 KG/M2

## 2022-04-07 DIAGNOSIS — I10 PRIMARY HYPERTENSION: ICD-10-CM

## 2022-04-07 DIAGNOSIS — R60.0 PEDAL EDEMA: ICD-10-CM

## 2022-04-07 DIAGNOSIS — Z79.899 HIGH RISK MEDICATION USE: ICD-10-CM

## 2022-04-07 DIAGNOSIS — E78.2 MIXED HYPERLIPIDEMIA: ICD-10-CM

## 2022-04-07 DIAGNOSIS — I42.9 CARDIOMYOPATHY, UNSPECIFIED TYPE: ICD-10-CM

## 2022-04-07 DIAGNOSIS — Z00.00 ENCOUNTER FOR SUBSEQUENT ANNUAL WELLNESS VISIT (AWV) IN MEDICARE PATIENT: Primary | ICD-10-CM

## 2022-04-07 DIAGNOSIS — I50.9 CHRONIC CONGESTIVE HEART FAILURE, UNSPECIFIED HEART FAILURE TYPE: ICD-10-CM

## 2022-04-07 PROCEDURE — 1170F FXNL STATUS ASSESSED: CPT | Performed by: FAMILY MEDICINE

## 2022-04-07 PROCEDURE — 99214 OFFICE O/P EST MOD 30 MIN: CPT | Performed by: FAMILY MEDICINE

## 2022-04-07 PROCEDURE — 1126F AMNT PAIN NOTED NONE PRSNT: CPT | Performed by: FAMILY MEDICINE

## 2022-04-07 PROCEDURE — G0439 PPPS, SUBSEQ VISIT: HCPCS | Performed by: FAMILY MEDICINE

## 2022-04-07 PROCEDURE — 1159F MED LIST DOCD IN RCRD: CPT | Performed by: FAMILY MEDICINE

## 2022-04-07 RX ORDER — ACETAMINOPHEN 160 MG
4000 TABLET,DISINTEGRATING ORAL DAILY
Qty: 60 CAPSULE | Refills: 11 | Status: SHIPPED | OUTPATIENT
Start: 2022-04-07 | End: 2023-04-07

## 2022-04-07 NOTE — PROGRESS NOTES
"The ABCs of the Annual Wellness Visit  Subsequent Medicare Wellness Visit    Chief Complaint   Patient presents with   • Medicare Wellness-subsequent      Subjective    History of Present Illness:  Mitzi Villafuerte is a 76 y.o. female who presents for a Subsequent Medicare Wellness Visit.    The following portions of the patient's history were reviewed and   updated as appropriate: allergies, current medications, past family history, past medical history, past social history, past surgical history and problem list.    Compared to one year ago, the patient feels her physical   health is worse.     Compared to one year ago, the patient feels her mental   health is same.      Weight   The patient is in the appropriate weight range.     Memory  The patient feels that her memory is \"good.\"    Medications  The patient is taking aspirin, atorvastatin, escitalopram, furosemide, Lidoderm patches, losartan, metoprolol, oxycodone, potassium chloride, spironolactone, and vitamin D. She has been on oxycodone since 2012, and it is currently managed by [facility]. It should be noted that while atorvastatin is on the patient's medication list, she does not think that she has been taking it.     Laboratory Data  Labs are reviewed. Alkaline phosphate is mildly elevated, which is likely age-related. All other labs are normal including CBC, CMP, thyroid panel, and lipid panel. Her vitamin D level is deficient at 17.9.     Debfibrillator placement   The patient recently underwent defibrillator placement. She states that she thinks it was placed for atrial fibrillation, though congestive heart failure was also mentioned. She was in the hospital the week of Thanksgiving for atrial fibrillation. She had an ejection fraction of 31 to 35 percent in 11/2021. On repeat in 02/2022, it was 25 percent, at which point she underwent defibrillator placement.  She sees Dr. Gordillo who plans to see her every 4 weeks for 6 " months, post defibrillator placement. The patient describes walking a distance of approximately 2 city blocks with no shortness of breath.     Pedal edema  She reports that leg swelling has decreased significantly since defibrillator placement, though it is still significant.     Hypertension  Patient currently taking her medications as prescribed    Hyperlipidemia  She does try to follow low-cholesterol diet    She denies any bowel or bladder difficulties.     Recent Hospitalizations:  This patient has had a [facility] admission record on file within the last 365 days.    Current Medical Providers:  Patient Care Team:  Rani Garza DO as PCP - General (Family Medicine)    Outpatient Medications Prior to Visit   Medication Sig Dispense Refill   • aspirin (aspirin) 81 MG EC tablet Take 1 tablet by mouth Daily. 30 tablet 0   • escitalopram (LEXAPRO) 10 MG tablet TAKE 1 TABLET BY MOUTH EVERY DAY 90 tablet 3   • furosemide (LASIX) 20 MG tablet TAKE ONE TABLET BY MOUTH ONCE DAILY AS NEEDED FOR WEIGHT GAIN OF 2 POUNDS OR MORE FROM THE PREVIOUS MORNING 15 tablet 3   • lidocaine (LIDODERM) 5 % Place 2 patches on the skin as directed by provider Daily. Remove & Discard patch within 12 hours or as directed by MD 30 patch 0   • losartan (Cozaar) 25 MG tablet Take 1 tablet by mouth Daily. 90 tablet 3   • metoprolol succinate XL (TOPROL-XL) 50 MG 24 hr tablet Take 1 tablet by mouth Daily. 30 tablet 11   • oxyCODONE (ROXICODONE) 15 MG immediate release tablet Take 15 mg by mouth 4 (Four) Times a Day As Needed.     • potassium chloride 10 MEQ CR tablet Take 1 tablet by mouth Daily. 7 tablet 0   • spironolactone (ALDACTONE) 25 MG tablet Take 1 tablet by mouth Daily. 30 tablet 11   • vitamin D (ERGOCALCIFEROL) 1.25 MG (81693 UT) capsule capsule Take 1 capsule by mouth Every 30 (Thirty) Days. 3 capsule 3   • acetaminophen (TYLENOL) 650 MG 8 hr tablet Take 650 mg by mouth Daily.     • atorvastatin (LIPITOR) 10 MG tablet  "Take 10 mg by mouth Daily.       No facility-administered medications prior to visit.       Opioid medication/s are on active medication list.  and I have evaluated her active treatment plan and pain score trends (see table).  Vitals:    04/07/22 1039   PainSc: 0-No pain     I have reviewed the chart for potential of high risk medication and harmful drug interactions in the elderly.            Aspirin is on active medication list. Aspirin use is indicated based on review of current medical condition/s. Pros and cons of this therapy have been discussed today. Benefits of this medication outweigh potential harm.  Patient has been encouraged to continue taking this medication.  .      Patient Active Problem List   Diagnosis   • Closed displaced comminuted fracture of shaft of left femur (HCC)   • Closed displaced fracture of shaft of third metacarpal bone of left hand   • B-cell lymphoma (HCC)   • Cardiomyopathy (HCC) EF 20-25%   • Chronic low back pain with left-sided sciatica   • Chronic pain disorder   • Chronic pain due to neoplasm   • Hip pain   • Hypertension, benign   • Lung mass   • Mixed hyperlipidemia   • Primary insomnia   • Vitamin D deficiency   • Dilated cardiomyopathy (HCC)   • Pleural effusion   • SOB (shortness of breath)   • Chronic systolic heart failure (HCC)   • Tachycardia     Advance Care Planning  Advance Directive is not on file.  ACP discussion was held with the patient during this visit. Patient has an advance directive (not in EMR), copy requested.          Objective    Vitals:    04/07/22 1039   BP: 102/68   BP Location: Left arm   Patient Position: Sitting   Cuff Size: Adult   Pulse: 96   Temp: 97.5 °F (36.4 °C)   TempSrc: Temporal   SpO2: 100%   Weight: 47.3 kg (104 lb 3.2 oz)   Height: 157.5 cm (62\")   PainSc: 0-No pain     BMI Readings from Last 1 Encounters:   04/07/22 19.06 kg/m²   BMI is within normal parameters. No follow-up required.    Does the patient have evidence of cognitive " impairment? no    Physical Exam  Vitals and nursing note reviewed.   Constitutional:       Appearance: Normal appearance. She is well-developed.   HENT:      Head: Normocephalic and atraumatic.      Right Ear: External ear normal.      Left Ear: External ear normal.      Nose: Nose normal.      Mouth/Throat:      Mouth: Mucous membranes are moist.   Eyes:      Extraocular Movements: Extraocular movements intact.      Conjunctiva/sclera: Conjunctivae normal.      Pupils: Pupils are equal, round, and reactive to light.   Neck:      Thyroid: No thyromegaly.      Vascular: No JVD.      Trachea: No tracheal deviation.   Cardiovascular:      Rate and Rhythm: Normal rate and regular rhythm.      Pulses: Normal pulses.      Heart sounds: Normal heart sounds. No murmur heard.    No friction rub. No gallop.   Pulmonary:      Effort: Pulmonary effort is normal.      Breath sounds: Normal breath sounds.   Abdominal:      General: Bowel sounds are normal. There is no distension.      Palpations: Abdomen is soft.      Tenderness: There is no abdominal tenderness.   Musculoskeletal:         General: Normal range of motion.      Cervical back: Normal range of motion and neck supple.   Lymphadenopathy:      Cervical: No cervical adenopathy.   Skin:     General: Skin is warm and dry.      Capillary Refill: Capillary refill takes less than 2 seconds.   Neurological:      Mental Status: She is alert and oriented to person, place, and time.      Cranial Nerves: No cranial nerve deficit.      Coordination: Coordination normal.   Psychiatric:         Mood and Affect: Mood normal.         Behavior: Behavior normal.       Lab Results   Component Value Date    CHLPL 161 04/05/2022    TRIG 66 04/05/2022    HDL 43 04/05/2022     (H) 04/05/2022    VLDL 13 04/05/2022            HEALTH RISK ASSESSMENT    Smoking Status:  Social History     Tobacco Use   Smoking Status Former Smoker   Smokeless Tobacco Never Used   Tobacco Comment    quit  55 years ago     Alcohol Consumption:  Social History     Substance and Sexual Activity   Alcohol Use No     Fall Risk Screen:    IRASEMAADI Fall Risk Assessment was completed, and patient is at LOW risk for falls.Assessment completed on:4/7/2022    Depression Screening:  PHQ-2/PHQ-9 Depression Screening 4/7/2022   Retired Total Score -   Little Interest or Pleasure in Doing Things 0-->not at all   Feeling Down, Depressed or Hopeless 0-->not at all   PHQ-9: Brief Depression Severity Measure Score 0       Health Habits and Functional and Cognitive Screening:  Functional & Cognitive Status 4/7/2022   Do you have difficulty preparing food and eating? No   Do you have difficulty bathing yourself, getting dressed or grooming yourself? No   Do you have difficulty using the toilet? No   Do you have difficulty moving around from place to place? No   Do you have trouble with steps or getting out of a bed or a chair? No   Current Diet Well Balanced Diet   Dental Exam Up to date   Eye Exam Up to date   Exercise (times per week) 0 times per week   Current Exercises Include No Regular Exercise   Current Exercise Activities Include -   Do you need help using the phone?  No   Are you deaf or do you have serious difficulty hearing?  No   Do you need help with transportation? No   Do you need help shopping? No   Do you need help preparing meals?  No   Do you need help with housework?  No   Do you need help with laundry? No   Do you need help taking your medications? No   Do you need help managing money? No   Do you ever drive or ride in a car without wearing a seat belt? No   Have you felt unusual stress, anger or loneliness in the last month? No   Who do you live with? Alone   If you need help, do you have trouble finding someone available to you? No   Have you been bothered in the last four weeks by sexual problems? No   Do you have difficulty concentrating, remembering or making decisions? No       Age-appropriate Screening  Schedule:  Refer to the list below for future screening recommendations based on patient's age, sex and/or medical conditions. Orders for these recommended tests are listed in the plan section. The patient has been provided with a written plan.    Health Maintenance   Topic Date Due   • TDAP/TD VACCINES (1 - Tdap) Never done   • ZOSTER VACCINE (1 of 2) Never done   • INFLUENZA VACCINE  08/01/2022   • LIPID PANEL  04/05/2023   • DXA SCAN  04/30/2023              Assessment/Plan   CMS Preventative Services Quick Reference  Risk Factors Identified During Encounter  Obesity/Overweight   The above risks/problems have been discussed with the patient.  Follow up actions/plans if indicated are seen below in the Assessment/Plan Section.  Pertinent information has been shared with the patient in the After Visit Summary.    Diagnoses and all orders for this visit:    1. Subsequent Medicare Wellness Visit         - Vitamin D3 50 mcg, 2 capsules daily.       2.  High risk medication use (Primary)  -     Comprehensive metabolic panel; Standing    3. Congestive heart failure   With daily  Continue Lasix as ordered    4. Marked pedal edema  Elevate legs  Support hose on the morning of anemia    5. Hyperlipidemia, mild  Continue statin    6. Hypertension   Monitor blood pressure  Goal less than 130/80  Continue current medications    7. Cardiomyopathy     The patient's medications were reviewed with her. We discussed the need for frequently monitoring the chemistry, secondary to the types of medication she is taking. Also discussed that once she is starting to feel poorly or if she is having marked increase in her weight, she should contact the physicians immediately and not wait. Encouraged her to follow up routinely with her primary cardiologist as he directs. Continue current medications. We will follow her back in 6 months, sooner if she needs. She will have a standing order for a CMP every 3 months.    Other orders  -      Cholecalciferol (Vitamin D3) 50 MCG (2000 UT) capsule; Take 2 capsules by mouth Daily.  Dispense: 60 capsule; Refill: 11        Follow Up:   Return in about 6 months (around 10/7/2022).     An After Visit Summary and PPPS were made available to the patient.                Transcribed from ambient dictation for Rani Garza DO by Gaby Bates.  04/07/22   13:43 CDT    Patient verbalized consent to the visit recording.

## 2022-04-13 PROBLEM — Z79.899 HIGH RISK MEDICATION USE: Status: ACTIVE | Noted: 2022-04-13

## 2022-04-13 PROBLEM — R60.0 PEDAL EDEMA: Status: ACTIVE | Noted: 2022-04-13

## 2022-04-13 PROBLEM — I50.9 CHRONIC CONGESTIVE HEART FAILURE (HCC): Status: ACTIVE | Noted: 2022-04-13

## 2022-05-04 ENCOUNTER — OFFICE VISIT (OUTPATIENT)
Dept: CARDIOLOGY | Facility: CLINIC | Age: 77
End: 2022-05-04

## 2022-05-04 VITALS
DIASTOLIC BLOOD PRESSURE: 60 MMHG | WEIGHT: 105 LBS | SYSTOLIC BLOOD PRESSURE: 122 MMHG | OXYGEN SATURATION: 97 % | HEIGHT: 62 IN | BODY MASS INDEX: 19.32 KG/M2 | HEART RATE: 80 BPM

## 2022-05-04 DIAGNOSIS — I42.0 DILATED CARDIOMYOPATHY: ICD-10-CM

## 2022-05-04 DIAGNOSIS — I50.22 CHRONIC SYSTOLIC HEART FAILURE: Primary | ICD-10-CM

## 2022-05-04 DIAGNOSIS — I42.9 CARDIOMYOPATHY, UNSPECIFIED TYPE: ICD-10-CM

## 2022-05-04 PROCEDURE — 99024 POSTOP FOLLOW-UP VISIT: CPT | Performed by: INTERNAL MEDICINE

## 2022-05-04 RX ORDER — METOPROLOL SUCCINATE 50 MG/1
75 TABLET, EXTENDED RELEASE ORAL DAILY
Qty: 30 TABLET | Refills: 11 | Status: SHIPPED | OUTPATIENT
Start: 2022-05-04

## 2022-05-04 NOTE — PROGRESS NOTES
Subjective    Mitzi Villafuerte is a 76 y.o. female. Fu of chf after recent med adjustment    History of Present Illness     CHRONIC HFrEF:  She has noted a dramatic improvement in her stamina since LOV. She has less ambriz and less edema. She is adherent to meds. Her wt and appetite are stable.     DILATED CMO WITH PACER-AICD:  Good healing at the site and no pain. Good HEART LOGIC scores.        The following portions of the patient's history were reviewed and updated as appropriate: allergies, current medications, past family history, past medical history, past social history, past surgical history and problem list.    Patient Active Problem List   Diagnosis   • Closed displaced comminuted fracture of shaft of left femur (HCC)   • Closed displaced fracture of shaft of third metacarpal bone of left hand   • B-cell lymphoma (HCC)   • Cardiomyopathy (HCC) EF 20-25%   • Chronic low back pain with left-sided sciatica   • Chronic pain disorder   • Chronic pain due to neoplasm   • Hip pain   • Primary hypertension   • Lung mass   • Mixed hyperlipidemia   • Primary insomnia   • Vitamin D deficiency   • Dilated cardiomyopathy (HCC)   • Pleural effusion   • SOB (shortness of breath)   • Chronic systolic heart failure (HCC)   • Tachycardia   • Pedal edema   • Chronic congestive heart failure (HCC)   • High risk medication use       Allergies   Allergen Reactions   • Phenergan [Promethazine Hcl]    • Promethazine Unknown - High Severity       Family History   Problem Relation Age of Onset   • Heart failure Mother    • Liver disease Father    • Heart disease Sister        Social History     Socioeconomic History   • Marital status: Single   Tobacco Use   • Smoking status: Former Smoker   • Smokeless tobacco: Never Used   • Tobacco comment: quit 55 years ago   Vaping Use   • Vaping Use: Never used   Substance and Sexual Activity   • Alcohol use: No   • Drug use: No   • Sexual activity: Not Currently         Current Outpatient  Medications:   •  acetaminophen (TYLENOL) 650 MG 8 hr tablet, Take 650 mg by mouth Daily., Disp: , Rfl:   •  aspirin (aspirin) 81 MG EC tablet, Take 1 tablet by mouth Daily., Disp: 30 tablet, Rfl: 0  •  atorvastatin (LIPITOR) 10 MG tablet, Take 10 mg by mouth Daily., Disp: , Rfl:   •  Cholecalciferol (Vitamin D3) 50 MCG (2000 UT) capsule, Take 2 capsules by mouth Daily., Disp: 60 capsule, Rfl: 11  •  escitalopram (LEXAPRO) 10 MG tablet, TAKE 1 TABLET BY MOUTH EVERY DAY, Disp: 90 tablet, Rfl: 3  •  furosemide (LASIX) 20 MG tablet, TAKE ONE TABLET BY MOUTH ONCE DAILY AS NEEDED FOR WEIGHT GAIN OF 2 POUNDS OR MORE FROM THE PREVIOUS MORNING, Disp: 15 tablet, Rfl: 3  •  lidocaine (LIDODERM) 5 %, Place 2 patches on the skin as directed by provider Daily. Remove & Discard patch within 12 hours or as directed by MD, Disp: 30 patch, Rfl: 0  •  losartan (Cozaar) 25 MG tablet, Take 1 tablet by mouth Daily., Disp: 90 tablet, Rfl: 3  •  oxyCODONE (ROXICODONE) 15 MG immediate release tablet, Take 15 mg by mouth 4 (Four) Times a Day As Needed., Disp: , Rfl:   •  potassium chloride 10 MEQ CR tablet, Take 1 tablet by mouth Daily., Disp: 7 tablet, Rfl: 0  •  spironolactone (ALDACTONE) 25 MG tablet, Take 1 tablet by mouth Daily., Disp: 30 tablet, Rfl: 11  •  metoprolol succinate XL (TOPROL-XL) 50 MG 24 hr tablet, Take 1.5 tablets by mouth Daily., Disp: 30 tablet, Rfl: 11    Past Surgical History:   Procedure Laterality Date   • CARDIAC ELECTROPHYSIOLOGY PROCEDURE N/A 3/2/2022    Procedure: ICD DC new;  Surgeon: Austin Gordillo MD;  Location: [facility] CATH INVASIVE LOCATION;  Service: Cardiology;  Laterality: N/A;   • CHOLECYSTECTOMY     • HERNIA REPAIR     • HIP TROCHANTERIC NAILING WITH INTRAMEDULLARY HIP SCREW Left 2/2/2017    Procedure: HIP TROCANTERIC NAILING LONG WITH INTRAMEDULLARY HIP SCREW WITH CAST APPLICATION TO LEFT HAND;  Surgeon: Beni Culp MD;  Location:  [facility] OR;  Service:        Review of Systems  "  Constitutional: Negative for activity change, appetite change, fatigue and unexpected weight change.   Respiratory: Negative for shortness of breath.    Cardiovascular: Negative for chest pain, palpitations and leg swelling.   Gastrointestinal: Negative for abdominal pain.   Genitourinary: Negative for difficulty urinating.       /60   Pulse 80   Ht 157.5 cm (62\")   Wt 47.6 kg (105 lb)   LMP  (LMP Unknown)   SpO2 97%   BMI 19.20 kg/m²   Procedures    Objective   Physical Exam  Constitutional:       Comments: Very thin   Cardiovascular:      Rate and Rhythm: Normal rate and regular rhythm.      Pulses: Normal pulses.      Heart sounds: Normal heart sounds. No murmur heard.    No friction rub. No gallop.   Pulmonary:      Effort: Pulmonary effort is normal.      Breath sounds: Normal breath sounds. No rales.   Abdominal:      General: Abdomen is flat.   Musculoskeletal:      Right lower leg: No edema.      Left lower leg: No edema.   Skin:     General: Skin is warm and dry.      Comments: dermatoslerosis bilat below the knees   Neurological:      General: No focal deficit present.      Mental Status: She is oriented to person, place, and time.   Psychiatric:         Mood and Affect: Mood normal.         Assessment/Plan   Diagnoses and all orders for this visit:    1. Chronic systolic heart failure (HCC) (Primary)  Comments:  appears euvolemic and improved stamina - increase Toprol to 75 daily  Orders:  -     metoprolol succinate XL (TOPROL-XL) 50 MG 24 hr tablet; Take 1.5 tablets by mouth Daily.  Dispense: 30 tablet; Refill: 11    2. Cardiomyopathy, unspecified type (HCC)    3. Dilated cardiomyopathy (HCC)                 Return in about 3 months (around 8/4/2022) for Next scheduled follow up.  No orders of the defined types were placed in this encounter.    "

## 2022-05-11 PROCEDURE — 93297 REM INTERROG DEV EVAL ICPMS: CPT | Performed by: INTERNAL MEDICINE

## 2022-05-11 PROCEDURE — G2066 INTER DEVC REMOTE 30D: HCPCS | Performed by: INTERNAL MEDICINE

## 2022-08-09 ENCOUNTER — OFFICE VISIT (OUTPATIENT)
Dept: CARDIOLOGY | Facility: CLINIC | Age: 77
End: 2022-08-09

## 2022-08-09 VITALS
DIASTOLIC BLOOD PRESSURE: 60 MMHG | WEIGHT: 102 LBS | HEART RATE: 68 BPM | HEIGHT: 62 IN | SYSTOLIC BLOOD PRESSURE: 132 MMHG | BODY MASS INDEX: 18.77 KG/M2

## 2022-08-09 DIAGNOSIS — I42.0 DILATED CARDIOMYOPATHY: Primary | ICD-10-CM

## 2022-08-09 DIAGNOSIS — I50.22 CHRONIC SYSTOLIC HEART FAILURE: ICD-10-CM

## 2022-08-09 DIAGNOSIS — Z95.810 AICD (AUTOMATIC CARDIOVERTER/DEFIBRILLATOR) PRESENT: ICD-10-CM

## 2022-08-09 PROCEDURE — 93000 ELECTROCARDIOGRAM COMPLETE: CPT | Performed by: INTERNAL MEDICINE

## 2022-08-09 PROCEDURE — 99214 OFFICE O/P EST MOD 30 MIN: CPT | Performed by: INTERNAL MEDICINE

## 2022-08-09 RX ORDER — SACUBITRIL AND VALSARTAN 24; 26 MG/1; MG/1
1 TABLET, FILM COATED ORAL 2 TIMES DAILY
Qty: 60 TABLET | Refills: 11 | Status: SHIPPED | OUTPATIENT
Start: 2022-08-09 | End: 2023-01-10 | Stop reason: DRUGHIGH

## 2022-08-09 NOTE — PROGRESS NOTES
Subjective    Mitzi Villafuerte is a 76 y.o. female. Fu of chf    History of Present Illness     DIL CMO - HFrEF:  She has felt well since LOV. Is active and has improved stamina and good energy and appetite. She has not had to use any of her prn Lasix since LOV. Has no palpitations or unusual soa and no edema. EKG today is nsc.    S/P PACER-AICD:  This has healed well and no pain at the site. It is prominent as she has a paucity of subq tissue. It interrogates well and ap/=2/0. She has not needed any tachy tx to date.        The following portions of the patient's history were reviewed and updated as appropriate: allergies, current medications, past family history, past medical history, past social history, past surgical history and problem list.    Patient Active Problem List   Diagnosis   • Closed displaced comminuted fracture of shaft of left femur (HCC)   • Closed displaced fracture of shaft of third metacarpal bone of left hand   • B-cell lymphoma (HCC)   • Cardiomyopathy (HCC) EF 20-25%   • Chronic low back pain with left-sided sciatica   • Chronic pain disorder   • Chronic pain due to neoplasm   • Hip pain   • Primary hypertension   • Lung mass   • Mixed hyperlipidemia   • Primary insomnia   • Vitamin D deficiency   • Dilated cardiomyopathy (HCC)   • Pleural effusion   • SOB (shortness of breath)   • Chronic systolic heart failure (HCC)   • Tachycardia   • Pedal edema   • Chronic congestive heart failure (HCC)   • High risk medication use   • AICD (automatic cardioverter/defibrillator) present       Allergies   Allergen Reactions   • Phenergan [Promethazine Hcl]    • Promethazine Unknown - High Severity       Family History   Problem Relation Age of Onset   • Heart failure Mother    • Liver disease Father    • Heart disease Sister        Social History     Socioeconomic History   • Marital status: Single   Tobacco Use   • Smoking status: Former Smoker   • Smokeless tobacco: Never Used   • Tobacco comment:  quit 55 years ago   Vaping Use   • Vaping Use: Never used   Substance and Sexual Activity   • Alcohol use: No   • Drug use: No   • Sexual activity: Not Currently         Current Outpatient Medications:   •  acetaminophen (TYLENOL) 650 MG 8 hr tablet, Take 650 mg by mouth Daily., Disp: , Rfl:   •  aspirin (aspirin) 81 MG EC tablet, Take 1 tablet by mouth Daily., Disp: 30 tablet, Rfl: 0  •  atorvastatin (LIPITOR) 10 MG tablet, Take 10 mg by mouth Daily., Disp: , Rfl:   •  Cholecalciferol (Vitamin D3) 50 MCG (2000 UT) capsule, Take 2 capsules by mouth Daily., Disp: 60 capsule, Rfl: 11  •  escitalopram (LEXAPRO) 10 MG tablet, TAKE 1 TABLET BY MOUTH EVERY DAY, Disp: 90 tablet, Rfl: 3  •  furosemide (LASIX) 20 MG tablet, TAKE ONE TABLET BY MOUTH ONCE DAILY AS NEEDED FOR WEIGHT GAIN OF 2 POUNDS OR MORE FROM THE PREVIOUS MORNING, Disp: 15 tablet, Rfl: 3  •  lidocaine (LIDODERM) 5 %, Place 2 patches on the skin as directed by provider Daily. Remove & Discard patch within 12 hours or as directed by MD, Disp: 30 patch, Rfl: 0  •  metoprolol succinate XL (TOPROL-XL) 50 MG 24 hr tablet, Take 1.5 tablets by mouth Daily., Disp: 30 tablet, Rfl: 11  •  oxyCODONE (ROXICODONE) 15 MG immediate release tablet, Take 15 mg by mouth 4 (Four) Times a Day As Needed., Disp: , Rfl:   •  potassium chloride 10 MEQ CR tablet, Take 1 tablet by mouth Daily., Disp: 7 tablet, Rfl: 0  •  spironolactone (ALDACTONE) 25 MG tablet, Take 1 tablet by mouth Daily., Disp: 30 tablet, Rfl: 11  •  sacubitril-valsartan (Entresto) 24-26 MG tablet, Take 1 tablet by mouth 2 (Two) Times a Day., Disp: 60 tablet, Rfl: 11    Past Surgical History:   Procedure Laterality Date   • CARDIAC ELECTROPHYSIOLOGY PROCEDURE N/A 3/2/2022    Procedure: ICD DC new;  Surgeon: Austin Gordillo MD;  Location: [facility] CATH INVASIVE LOCATION;  Service: Cardiology;  Laterality: N/A;   • CHOLECYSTECTOMY     • HERNIA REPAIR     • HIP TROCHANTERIC NAILING WITH INTRAMEDULLARY HIP SCREW  "Left 2/2/2017    Procedure: HIP TROCANTERIC NAILING LONG WITH INTRAMEDULLARY HIP SCREW WITH CAST APPLICATION TO LEFT HAND;  Surgeon: Beni Culp MD;  Location: [facility] OR;  Service:        Review of Systems   Constitutional: Negative for activity change, appetite change, fatigue and unexpected weight change.   Respiratory: Negative for shortness of breath and wheezing.    Cardiovascular: Negative for chest pain, palpitations and leg swelling.   Gastrointestinal: Negative for abdominal pain and blood in stool.   Genitourinary: Negative for difficulty urinating and hematuria.       /60   Pulse 68   Ht 157.5 cm (62\")   Wt 46.3 kg (102 lb)   LMP  (LMP Unknown)   BMI 18.66 kg/m²   Procedures    Objective   Physical Exam  Constitutional:       Comments: Very thin   Cardiovascular:      Rate and Rhythm: Normal rate and regular rhythm.      Pulses: Normal pulses.      Heart sounds: Normal heart sounds. No murmur heard.    No friction rub. No gallop.   Pulmonary:      Effort: Pulmonary effort is normal.      Breath sounds: Normal breath sounds. No wheezing or rales.   Abdominal:      General: Bowel sounds are normal.      Tenderness: There is no abdominal tenderness.   Musculoskeletal:      Right lower leg: No edema.      Left lower leg: No edema.   Skin:     General: Skin is warm and dry.   Neurological:      General: No focal deficit present.   Psychiatric:         Mood and Affect: Mood normal.         Assessment & Plan   Diagnoses and all orders for this visit:    1. Dilated cardiomyopathy (HCC) (Primary)  Comments:  severe per last ECHO  Orders:  -     ECG 12 Lead    2. Chronic systolic heart failure (HCC)  Comments:  compensated  Orders:  -     sacubitril-valsartan (Entresto) 24-26 MG tablet; Take 1 tablet by mouth 2 (Two) Times a Day.  Dispense: 60 tablet; Refill: 11  -     Basic Metabolic Panel; Future    3. AICD (automatic cardioverter/defibrillator) present  Comments:  good function      Mdm=mod " review of data and labs and decision to change script meds with fu labs           Return in about 2 months (around 10/9/2022).  Orders Placed This Encounter   Procedures   • Basic Metabolic Panel     Standing Status:   Future     Standing Expiration Date:   8/9/2023     Order Specific Question:   Release to patient     Answer:   Routine Release   • ECG 12 Lead     Order Specific Question:   Reason for Exam:     Answer:   [email or web address]     Order Specific Question:   Release to patient     Answer:   Routine Release

## 2022-08-10 ENCOUNTER — TELEPHONE (OUTPATIENT)
Dept: CARDIOLOGY | Facility: CLINIC | Age: 77
End: 2022-08-10

## 2022-08-10 NOTE — TELEPHONE ENCOUNTER
Caller: Mitzi Villafuerte    Relationship: Self    Best call back number: [phone]    What is the best time to reach you: ANYTIME     Who are you requesting to speak with (clinical staff, provider,  specific staff member): ANYONE     What was the call regarding: PATIENT WAS TOLD BY PHARMACY ENTRESTO WOULD BE $330. WOULD LIKE TO KNOW IF THERE ARE COUPONS, SAMPLES, OR SOMETHING SIMILAR TO THE MEDICATION.     Do you require a callback: YES

## 2022-08-11 ENCOUNTER — TELEPHONE (OUTPATIENT)
Dept: CARDIOLOGY | Facility: CLINIC | Age: 77
End: 2022-08-11

## 2022-08-11 NOTE — TELEPHONE ENCOUNTER
Patient has called in today stating she is wondering if she can take a Cortizone shot? Please advise.

## 2022-09-29 ENCOUNTER — OFFICE VISIT (OUTPATIENT)
Dept: INTERNAL MEDICINE | Facility: CLINIC | Age: 77
End: 2022-09-29

## 2022-09-29 VITALS
DIASTOLIC BLOOD PRESSURE: 72 MMHG | HEART RATE: 92 BPM | HEIGHT: 62 IN | WEIGHT: 100 LBS | BODY MASS INDEX: 18.4 KG/M2 | TEMPERATURE: 96.9 F | SYSTOLIC BLOOD PRESSURE: 130 MMHG | OXYGEN SATURATION: 98 %

## 2022-09-29 DIAGNOSIS — R63.4 WEIGHT LOSS, UNINTENTIONAL: ICD-10-CM

## 2022-09-29 DIAGNOSIS — I42.0 DILATED CARDIOMYOPATHY: ICD-10-CM

## 2022-09-29 DIAGNOSIS — E78.2 MIXED HYPERLIPIDEMIA: ICD-10-CM

## 2022-09-29 DIAGNOSIS — I10 PRIMARY HYPERTENSION: Primary | ICD-10-CM

## 2022-09-29 DIAGNOSIS — I50.22 CHRONIC SYSTOLIC HEART FAILURE: ICD-10-CM

## 2022-09-29 PROCEDURE — 99214 OFFICE O/P EST MOD 30 MIN: CPT | Performed by: FAMILY MEDICINE

## 2022-09-29 RX ORDER — MEGESTROL ACETATE 125 MG/ML
625 SUSPENSION ORAL DAILY
Qty: 150 ML | Refills: 2 | Status: SHIPPED | OUTPATIENT
Start: 2022-09-29 | End: 2022-11-11

## 2022-09-29 RX ORDER — ATORVASTATIN CALCIUM 10 MG/1
10 TABLET, FILM COATED ORAL DAILY
Qty: 90 TABLET | Refills: 3 | Status: SHIPPED | OUTPATIENT
Start: 2022-09-29

## 2022-09-29 NOTE — PROGRESS NOTES
Subjective     Chief Complaint   Patient presents with   • Hypertension     6 month follow up    • Hyperlipidemia     Discuss chol med       History of Present Illness    The patient presents today for a physical. She states she needs assistance with increasing her appetite due to losing weight recently. She states that she has utilized high caloric drinks previously. She states that she is active physically and does not experience shortness of breath when doing so. She states that she is not experiencing any tenderness in her gastrointestinal region or edema within her legs currently. She states that she does not get her yearly flu vaccine.   Blood pressures been staying in goal range.  She is following a low-cholesterol diet.  She does not want to continue continue taking cholesterol medicine.  Patient's PMR from outside medical facility reviewed and noted.    Review of Systems     Otherwise complete ROS reviewed and negative except as mentioned in the HPI.    Past Medical History:   Past Medical History:   Diagnosis Date   • Broken femur (HCC)    • Fracture of hip (HCC)     left, due to lymphoma, treated non-operatively   • Hypertension    • Lymphoma (HCC)      Past Surgical History:  Past Surgical History:   Procedure Laterality Date   • CARDIAC ELECTROPHYSIOLOGY PROCEDURE N/A 3/2/2022    Procedure: ICD DC new;  Surgeon: Austin Gordillo MD;  Location: [facility] CATH INVASIVE LOCATION;  Service: Cardiology;  Laterality: N/A;   • CHOLECYSTECTOMY     • HERNIA REPAIR     • HIP TROCHANTERIC NAILING WITH INTRAMEDULLARY HIP SCREW Left 2/2/2017    Procedure: HIP TROCANTERIC NAILING LONG WITH INTRAMEDULLARY HIP SCREW WITH CAST APPLICATION TO LEFT HAND;  Surgeon: Beni Culp MD;  Location: [facility] OR;  Service:      Social History:  reports that she has quit smoking. She has never used smokeless tobacco. She reports that she does not drink alcohol and does not use drugs.    Family History: family  history includes Heart disease in her sister; Heart failure in her mother; Liver disease in her father.      Allergies:  Allergies   Allergen Reactions   • Phenergan [Promethazine Hcl]    • Promethazine Unknown - High Severity     Medications:  Prior to Admission medications    Medication Sig Start Date End Date Taking? Authorizing Provider   aspirin (aspirin) 81 MG EC tablet Take 1 tablet by mouth Daily. 11/25/21  Yes Kevin Lee MD   Cholecalciferol (Vitamin D3) 50 MCG (2000 UT) capsule Take 2 capsules by mouth Daily. 4/7/22 4/7/23 Yes Rani Garza DO   escitalopram (LEXAPRO) 10 MG tablet TAKE 1 TABLET BY MOUTH EVERY DAY 8/6/20  Yes Elio Mao MD   furosemide (LASIX) 20 MG tablet TAKE ONE TABLET BY MOUTH ONCE DAILY AS NEEDED FOR WEIGHT GAIN OF 2 POUNDS OR MORE FROM THE PREVIOUS MORNING 3/28/22  Yes Romeo Shaffer APRN   lidocaine (LIDODERM) 5 % Place 2 patches on the skin as directed by provider Daily. Remove & Discard patch within 12 hours or as directed by MD 11/26/21  Yes Kevin Lee MD   metoprolol succinate XL (TOPROL-XL) 50 MG 24 hr tablet Take 1.5 tablets by mouth Daily. 5/4/22  Yes Austin Gordillo MD   oxyCODONE (ROXICODONE) 15 MG immediate release tablet Take 15 mg by mouth 4 (Four) Times a Day As Needed.   Yes Jamin Lloyd MD   potassium chloride 10 MEQ CR tablet Take 1 tablet by mouth Daily. 11/25/21  Yes Kevin Lee MD   sacubitril-valsartan (Entresto) 24-26 MG tablet Take 1 tablet by mouth 2 (Two) Times a Day. 8/9/22  Yes Austin Gordillo MD   spironolactone (ALDACTONE) 25 MG tablet Take 1 tablet by mouth Daily. 2/23/22  Yes Romeo Shaffer APRN   acetaminophen (TYLENOL) 650 MG 8 hr tablet Take 650 mg by mouth Daily.    Jamin Lloyd MD   atorvastatin (LIPITOR) 10 MG tablet Take 10 mg by mouth Daily.    ProviderJamin MD       Objective     Vital Signs: /72 (BP Location: Left arm, Patient  "Position: Sitting, Cuff Size: Adult)   Pulse 92   Temp 96.9 °F (36.1 °C) (Temporal)   Ht 157.5 cm (62\")   Wt 45.4 kg (100 lb)   LMP  (LMP Unknown)   SpO2 98%   BMI 18.29 kg/m²   Physical Exam  Vitals and nursing note reviewed.   Constitutional:       Appearance: Normal appearance. She is well-developed.   HENT:      Head: Normocephalic and atraumatic.      Right Ear: External ear normal.      Left Ear: External ear normal.      Nose: Nose normal.      Mouth/Throat:      Mouth: Mucous membranes are moist.   Eyes:      Extraocular Movements: Extraocular movements intact.      Conjunctiva/sclera: Conjunctivae normal.      Pupils: Pupils are equal, round, and reactive to light.   Neck:      Thyroid: No thyromegaly.      Vascular: No JVD.      Trachea: No tracheal deviation.   Cardiovascular:      Rate and Rhythm: Normal rate and regular rhythm.      Pulses: Normal pulses.      Heart sounds: Normal heart sounds. No murmur heard.    No friction rub. No gallop.   Pulmonary:      Effort: Pulmonary effort is normal.      Breath sounds: Normal breath sounds.   Abdominal:      General: Bowel sounds are normal. There is no distension.      Palpations: Abdomen is soft.      Tenderness: There is no abdominal tenderness.   Musculoskeletal:         General: Normal range of motion.      Cervical back: Normal range of motion and neck supple.   Lymphadenopathy:      Cervical: No cervical adenopathy.   Skin:     General: Skin is warm and dry.      Capillary Refill: Capillary refill takes less than 2 seconds.   Neurological:      Mental Status: She is alert and oriented to person, place, and time.      Cranial Nerves: No cranial nerve deficit.      Coordination: Coordination normal.   Psychiatric:         Mood and Affect: Mood normal.         Behavior: Behavior normal.         BMI is below normal parameters (malnutrition). Recommendations: Boost or Ensure.      Results Reviewed:  Glucose   Date Value Ref Range Status   04/05/2022 " 89 65 - 99 mg/dL Final   11/25/2021 99 65 - 99 mg/dL Final   08/11/2021 104 74 - 109 mg/dL Final     BUN   Date Value Ref Range Status   04/05/2022 15 8 - 23 mg/dL Final   11/25/2021 23 8 - 23 mg/dL Final   08/11/2021 13 8 - 23 mg/dL Final     Creatinine   Date Value Ref Range Status   04/05/2022 0.76 0.57 - 1.00 mg/dL Final   11/25/2021 0.59 0.57 - 1.00 mg/dL Final   08/11/2021 0.4 (L) 0.5 - 0.9 mg/dL Final     Sodium   Date Value Ref Range Status   04/05/2022 139 136 - 145 mmol/L Final   11/25/2021 141 136 - 145 mmol/L Final   08/11/2021 137 136 - 145 mmol/L Final     Potassium   Date Value Ref Range Status   04/05/2022 4.3 3.5 - 5.2 mmol/L Final   11/25/2021 3.4 (L) 3.5 - 5.2 mmol/L Final   08/11/2021 3.4 (L) 3.5 - 5.0 mmol/L Final     Chloride   Date Value Ref Range Status   04/05/2022 99 98 - 107 mmol/L Final   11/25/2021 101 98 - 107 mmol/L Final   08/11/2021 98 98 - 111 mmol/L Final     CO2   Date Value Ref Range Status   11/25/2021 29.0 22.0 - 29.0 mmol/L Final   08/11/2021 23 22 - 29 mmol/L Final     Total CO2   Date Value Ref Range Status   04/05/2022 29.5 (H) 22.0 - 29.0 mmol/L Final     Calcium   Date Value Ref Range Status   04/05/2022 9.5 8.6 - 10.5 mg/dL Final   11/25/2021 8.1 (L) 8.6 - 10.5 mg/dL Final   08/11/2021 8.3 (L) 8.8 - 10.2 mg/dL Final     ALT (SGPT)   Date Value Ref Range Status   04/05/2022 8 1 - 33 U/L Final   11/22/2021 120 (H) 1 - 33 U/L Final   04/18/2018 15 5 - 33 U/L Final     AST (SGOT)   Date Value Ref Range Status   04/05/2022 18 1 - 32 U/L Final   11/22/2021 84 (H) 1 - 32 U/L Final   04/18/2018 19 5 - 32 U/L Final     WBC   Date Value Ref Range Status   04/05/2022 6.08 3.40 - 10.80 10*3/mm3 Final   08/11/2021 3.2 (L) 4.8 - 10.8 K/uL Final     Hematocrit   Date Value Ref Range Status   04/05/2022 39.4 34.0 - 46.6 % Final   11/25/2021 37.8 34.0 - 46.6 % Final   08/11/2021 39.2 37.0 - 47.0 % Final     Platelets   Date Value Ref Range Status   04/05/2022 300 140 - 450 10*3/mm3 Final    11/25/2021 276 140 - 450 10*3/mm3 Final   08/11/2021 191 130 - 400 K/uL Final     Total Cholesterol   Date Value Ref Range Status   11/25/2021 118 0 - 200 mg/dL Final     Triglycerides   Date Value Ref Range Status   04/05/2022 66 0 - 150 mg/dL Final   11/25/2021 117 0 - 150 mg/dL Final     HDL Cholesterol   Date Value Ref Range Status   04/05/2022 43 40 - 60 mg/dL Final   11/25/2021 28 (L) 40 - 60 mg/dL Final     LDL Chol Calc (NIH)   Date Value Ref Range Status   04/05/2022 105 (H) 0 - 100 mg/dL Final     LDL/HDL Ratio   Date Value Ref Range Status   11/25/2021 2.38  Final         Assessment / Plan     Assessment/Plan:  1. Primary hypertension    - Comprehensive metabolic panel  Monitor blood pressure goal is less than 130/80.    2. Mixed hyperlipidemia  Low-cholesterol diet.    3. Chronic systolic heart failure (HCC)  Continue current medications.  Monitor for shortness of breath.    4. Dilated cardiomyopathy (HCC)  Continue current medications.    5. Weight loss, unintentional  Boost or Ensure daily.  She could also try [place] instant breakfast mixed with ice cream and milk for a nutritious shake.  Add Megace     Patient is to continue current medications. She is going to obtain chemistry today so that we can evaluate her kidneys and the liver functions. We have added to the medication; she he has taken this in the past. She does need to gain a little weight. We suggested her drinking [place] instant breakfast drinks and ice cream with milk to make a nutritious shake to consume during the day. She does not take the flu vaccine; we discussed symptoms. She is to get tested if she develops any symptoms. Follow up in 3 months.    Return in about 3 months (around 12/29/2022). unless patient needs to be seen sooner or acute issues arise.      I have discussed the patient results/orders and and plan/recommendation with them at today's visit.      Rani Garza,    09/29/2022    Transcribed from ambient  dictation for Rani Garza DO by Jaimie Nolan.  09/29/22   13:11 CDT    Patient verbalized consent to the visit recording.  I have personally performed the services described in this document as transcribed by the above individual, and it is both accurate and complete.

## 2022-09-30 LAB
ALBUMIN SERPL-MCNC: 3.5 G/DL (ref 3.5–5.2)
ALBUMIN/GLOB SERPL: 1.4 G/DL
ALP SERPL-CCNC: 131 U/L (ref 39–117)
ALT SERPL-CCNC: 15 U/L (ref 1–33)
AST SERPL-CCNC: 18 U/L (ref 1–32)
BILIRUB SERPL-MCNC: 0.2 MG/DL (ref 0–1.2)
BUN SERPL-MCNC: 17 MG/DL (ref 8–23)
BUN/CREAT SERPL: 29.8 (ref 7–25)
CALCIUM SERPL-MCNC: 8.9 MG/DL (ref 8.6–10.5)
CHLORIDE SERPL-SCNC: 103 MMOL/L (ref 98–107)
CO2 SERPL-SCNC: 29 MMOL/L (ref 22–29)
CREAT SERPL-MCNC: 0.57 MG/DL (ref 0.57–1)
EGFRCR SERPLBLD CKD-EPI 2021: 94.3 ML/MIN/1.73
GLOBULIN SER CALC-MCNC: 2.5 GM/DL
GLUCOSE SERPL-MCNC: 95 MG/DL (ref 65–99)
POTASSIUM SERPL-SCNC: 4.3 MMOL/L (ref 3.5–5.2)
PROT SERPL-MCNC: 6 G/DL (ref 6–8.5)
SODIUM SERPL-SCNC: 138 MMOL/L (ref 136–145)

## 2022-10-10 PROCEDURE — 93296 REM INTERROG EVL PM/IDS: CPT | Performed by: INTERNAL MEDICINE

## 2022-10-10 PROCEDURE — 93295 DEV INTERROG REMOTE 1/2/MLT: CPT | Performed by: INTERNAL MEDICINE

## 2022-10-11 ENCOUNTER — OFFICE VISIT (OUTPATIENT)
Dept: CARDIOLOGY | Facility: CLINIC | Age: 77
End: 2022-10-11

## 2022-10-11 VITALS
DIASTOLIC BLOOD PRESSURE: 70 MMHG | SYSTOLIC BLOOD PRESSURE: 120 MMHG | HEART RATE: 75 BPM | BODY MASS INDEX: 18.4 KG/M2 | WEIGHT: 100 LBS | HEIGHT: 62 IN

## 2022-10-11 DIAGNOSIS — I50.22 CHRONIC SYSTOLIC HEART FAILURE: ICD-10-CM

## 2022-10-11 DIAGNOSIS — I42.0 DILATED CARDIOMYOPATHY: Primary | ICD-10-CM

## 2022-10-11 DIAGNOSIS — Z95.810 AICD (AUTOMATIC CARDIOVERTER/DEFIBRILLATOR) PRESENT: ICD-10-CM

## 2022-10-11 PROCEDURE — 99213 OFFICE O/P EST LOW 20 MIN: CPT | Performed by: INTERNAL MEDICINE

## 2022-10-11 PROCEDURE — 93000 ELECTROCARDIOGRAM COMPLETE: CPT | Performed by: INTERNAL MEDICINE

## 2022-10-11 NOTE — PROGRESS NOTES
Subjective    Mitzi Villafuerte is a 76 y.o. female. FU of chf    History of Present Illness     HFrEF - DIL CMO:  She has had wt loss thot 2/2 diminished appetite since her LOV. She has not started the Entresto as she is concerned about the cost and does not want to start until her papers for company help have been processed. She has not taken any extra Lasix. She has no unusual soa such as pnd or orthopnea and no leg edema. Her stamina is stable. EKG today is nscx nst is improved in lateral leads. She is adherent to her other meds and does daily wts.     AICD:  No pain at the site and no tachy tx needed.        The following portions of the patient's history were reviewed and updated as appropriate: allergies, current medications, past family history, past medical history, past social history, past surgical history and problem list.    Patient Active Problem List   Diagnosis   • Closed displaced comminuted fracture of shaft of left femur (HCC)   • Closed displaced fracture of shaft of third metacarpal bone of left hand   • B-cell lymphoma (HCC)   • Cardiomyopathy (HCC) EF 20-25%   • Chronic low back pain with left-sided sciatica   • Chronic pain disorder   • Chronic pain due to neoplasm   • Hip pain   • Primary hypertension   • Lung mass   • Mixed hyperlipidemia   • Primary insomnia   • Vitamin D deficiency   • Dilated cardiomyopathy (HCC)   • Pleural effusion   • SOB (shortness of breath)   • Chronic systolic heart failure (HCC)   • Tachycardia   • Pedal edema   • Chronic congestive heart failure (HCC)   • High risk medication use   • AICD (automatic cardioverter/defibrillator) present       Allergies   Allergen Reactions   • Phenergan [Promethazine Hcl]    • Promethazine Unknown - High Severity       Family History   Problem Relation Age of Onset   • Heart failure Mother    • Liver disease Father    • Heart disease Sister        Social History     Socioeconomic History   • Marital status: Single   Tobacco Use   •  Smoking status: Former   • Smokeless tobacco: Never   • Tobacco comments:     quit 55 years ago   Vaping Use   • Vaping Use: Never used   Substance and Sexual Activity   • Alcohol use: No   • Drug use: No   • Sexual activity: Not Currently         Current Outpatient Medications:   •  acetaminophen (TYLENOL) 650 MG 8 hr tablet, Take 650 mg by mouth Daily., Disp: , Rfl:   •  aspirin (aspirin) 81 MG EC tablet, Take 1 tablet by mouth Daily., Disp: 30 tablet, Rfl: 0  •  atorvastatin (LIPITOR) 10 MG tablet, Take 1 tablet by mouth Daily., Disp: 90 tablet, Rfl: 3  •  Cholecalciferol (Vitamin D3) 50 MCG (2000 UT) capsule, Take 2 capsules by mouth Daily., Disp: 60 capsule, Rfl: 11  •  escitalopram (LEXAPRO) 10 MG tablet, TAKE 1 TABLET BY MOUTH EVERY DAY (Patient taking differently: Take 1 tablet by mouth As Needed.), Disp: 90 tablet, Rfl: 3  •  furosemide (LASIX) 20 MG tablet, TAKE ONE TABLET BY MOUTH ONCE DAILY AS NEEDED FOR WEIGHT GAIN OF 2 POUNDS OR MORE FROM THE PREVIOUS MORNING, Disp: 15 tablet, Rfl: 3  •  lidocaine (LIDODERM) 5 %, Place 2 patches on the skin as directed by provider Daily. Remove & Discard patch within 12 hours or as directed by MD, Disp: 30 patch, Rfl: 0  •  megestrol (Megace ES) 625 MG/5ML suspension, Take 5 mL by mouth Daily., Disp: 150 mL, Rfl: 2  •  metoprolol succinate XL (TOPROL-XL) 50 MG 24 hr tablet, Take 1.5 tablets by mouth Daily., Disp: 30 tablet, Rfl: 11  •  oxyCODONE (ROXICODONE) 15 MG immediate release tablet, Take 15 mg by mouth 4 (Four) Times a Day As Needed., Disp: , Rfl:   •  potassium chloride 10 MEQ CR tablet, Take 1 tablet by mouth Daily., Disp: 7 tablet, Rfl: 0  •  sacubitril-valsartan (Entresto) 24-26 MG tablet, Take 1 tablet by mouth 2 (Two) Times a Day., Disp: 60 tablet, Rfl: 11  •  spironolactone (ALDACTONE) 25 MG tablet, Take 1 tablet by mouth Daily., Disp: 30 tablet, Rfl: 11    Past Surgical History:   Procedure Laterality Date   • CARDIAC ELECTROPHYSIOLOGY PROCEDURE N/A  "3/2/2022    Procedure: ICD DC new;  Surgeon: Austin Gordillo MD;  Location:  [facility] CATH INVASIVE LOCATION;  Service: Cardiology;  Laterality: N/A;   • CHOLECYSTECTOMY     • HERNIA REPAIR     • HIP TROCHANTERIC NAILING WITH INTRAMEDULLARY HIP SCREW Left 2/2/2017    Procedure: HIP TROCANTERIC NAILING LONG WITH INTRAMEDULLARY HIP SCREW WITH CAST APPLICATION TO LEFT HAND;  Surgeon: Beni Culp MD;  Location:  [facility] OR;  Service:        Review of Systems   Constitutional: Positive for appetite change and unexpected weight change.   Respiratory: Negative for shortness of breath and wheezing.    Cardiovascular: Negative for chest pain, palpitations and leg swelling.   Gastrointestinal: Negative for abdominal pain and blood in stool.   Genitourinary: Negative for difficulty urinating and hematuria.       /70   Pulse 75   Ht 157.5 cm (62\")   Wt 45.4 kg (100 lb)   LMP  (LMP Unknown)   BMI 18.29 kg/m²   Procedures    Objective   Physical Exam  Constitutional:       Appearance: She is not ill-appearing.      Comments: Very thin   Cardiovascular:      Rate and Rhythm: Normal rate and regular rhythm.      Pulses: Decreased pulses.      Heart sounds: Normal heart sounds. No murmur heard.    No friction rub. No gallop.   Pulmonary:      Effort: Pulmonary effort is normal.      Breath sounds: Normal breath sounds. No wheezing or rales.   Abdominal:      General: Bowel sounds are normal.      Tenderness: There is no abdominal tenderness.   Musculoskeletal:      Left lower leg: No edema.   Skin:     General: Skin is warm and dry.   Neurological:      General: No focal deficit present.      Mental Status: She is oriented to person, place, and time.   Psychiatric:         Mood and Affect: Mood normal.         Assessment & Plan   Diagnoses and all orders for this visit:    1. Dilated cardiomyopathy (HCC) (Primary)  Comments:  compensated - have encouraged her to start Entresto as ordered  Orders:  -     ECG 12 " Lead    2. Chronic systolic heart failure (HCC)  Comments:  compensated    3. AICD (automatic cardioverter/defibrillator) present  Comments:  good function                 Return in about 3 months (around 1/11/2023) for Next scheduled follow up.  Orders Placed This Encounter   Procedures   • ECG 12 Lead     Order Specific Question:   Reason for Exam:     Answer:   [email or web address].[email or web address]     Order Specific Question:   Release to patient     Answer:   Routine Release

## 2022-11-09 NOTE — TELEPHONE ENCOUNTER
Caller: Mitzi Villafuerte A    Relationship to patient: Self    Best call back number:  [phone]       Patient is needing: pt said that the medicaiton that was prescirbed by  was too expensive. megestrol (Megace ES) 625 MG/5ML suspension    She said that she can use [facility] and get the Megestrol 40mg tablets - qty 60 for a reasonable price. She is asking for this to be re-sent to pharmacy as tablets       [id number] [place], [place] - [address] AT [facility] [address] - [phone]  - [phone] FX  [phone]

## 2022-11-11 RX ORDER — MEGESTROL ACETATE 40 MG/1
40 TABLET ORAL DAILY
Qty: 60 TABLET | Refills: 1 | Status: SHIPPED | OUTPATIENT
Start: 2022-11-11

## 2022-11-13 PROCEDURE — 93297 REM INTERROG DEV EVAL ICPMS: CPT | Performed by: INTERNAL MEDICINE

## 2022-11-13 PROCEDURE — G2066 INTER DEVC REMOTE 30D: HCPCS | Performed by: INTERNAL MEDICINE

## 2022-12-06 ENCOUNTER — TELEPHONE (OUTPATIENT)
Dept: CARDIOLOGY | Facility: CLINIC | Age: 77
End: 2022-12-06

## 2022-12-06 NOTE — TELEPHONE ENCOUNTER
Patient educated on the importance of taking daily weights.  Understanding verbalized.  RN will continue to monitor [employer].

## 2022-12-06 NOTE — TELEPHONE ENCOUNTER
"[employer]Logic Heart Failure Index is elevated at 21.  A few days ago after Thanksgiving, patient reports BLE edema; states she took \"one of those pills\" and it was gone the next morning.  Reports that she had been on her feet for two to three days before swelling occurred.  Patient denies weight gain, dyspnea, orthopnea, PND, fatigue.  Reports that she is doing well.  RN advised patient to notify RN if any symptoms develop.  RN will notify Dr. Gordillo and will contact patient with his recommendations.    "

## 2023-01-01 ENCOUNTER — APPOINTMENT (OUTPATIENT)
Dept: CT IMAGING | Facility: HOSPITAL | Age: 78
DRG: 061 | End: 2023-01-01
Payer: MEDICARE

## 2023-01-01 ENCOUNTER — TELEPHONE (OUTPATIENT)
Dept: CARDIOLOGY | Facility: CLINIC | Age: 78
End: 2023-01-01

## 2023-01-01 ENCOUNTER — APPOINTMENT (OUTPATIENT)
Dept: CARDIOLOGY | Facility: HOSPITAL | Age: 78
DRG: 061 | End: 2023-01-01
Payer: MEDICARE

## 2023-01-01 ENCOUNTER — HOSPITAL ENCOUNTER (INPATIENT)
Facility: HOSPITAL | Age: 78
LOS: 2 days | DRG: 061 | End: 2023-08-13
Attending: STUDENT IN AN ORGANIZED HEALTH CARE EDUCATION/TRAINING PROGRAM | Admitting: FAMILY MEDICINE
Payer: MEDICARE

## 2023-01-01 ENCOUNTER — APPOINTMENT (OUTPATIENT)
Dept: GENERAL RADIOLOGY | Facility: HOSPITAL | Age: 78
DRG: 061 | End: 2023-01-01
Payer: MEDICARE

## 2023-01-01 ENCOUNTER — TELEPHONE (OUTPATIENT)
Dept: ONCOLOGY | Facility: CLINIC | Age: 78
End: 2023-01-01
Payer: COMMERCIAL

## 2023-01-01 ENCOUNTER — LAB (OUTPATIENT)
Dept: LAB | Facility: HOSPITAL | Age: 78
End: 2023-01-01
Payer: MEDICARE

## 2023-01-01 ENCOUNTER — INFUSION (OUTPATIENT)
Dept: ONCOLOGY | Facility: HOSPITAL | Age: 78
End: 2023-01-01
Payer: MEDICARE

## 2023-01-01 ENCOUNTER — APPOINTMENT (OUTPATIENT)
Dept: MRI IMAGING | Facility: HOSPITAL | Age: 78
DRG: 061 | End: 2023-01-01
Payer: COMMERCIAL

## 2023-01-01 ENCOUNTER — TELEPHONE (OUTPATIENT)
Dept: ONCOLOGY | Facility: CLINIC | Age: 78
End: 2023-01-01

## 2023-01-01 ENCOUNTER — APPOINTMENT (OUTPATIENT)
Dept: CT IMAGING | Facility: HOSPITAL | Age: 78
DRG: 061 | End: 2023-01-01
Payer: COMMERCIAL

## 2023-01-01 ENCOUNTER — APPOINTMENT (OUTPATIENT)
Dept: GENERAL RADIOLOGY | Facility: HOSPITAL | Age: 78
DRG: 061 | End: 2023-01-01
Payer: COMMERCIAL

## 2023-01-01 VITALS
BODY MASS INDEX: 18.53 KG/M2 | RESPIRATION RATE: 18 BRPM | TEMPERATURE: 99.2 F | DIASTOLIC BLOOD PRESSURE: 49 MMHG | SYSTOLIC BLOOD PRESSURE: 113 MMHG | OXYGEN SATURATION: 100 % | WEIGHT: 94.4 LBS | HEIGHT: 60 IN | HEART RATE: 114 BPM

## 2023-01-01 VITALS
HEIGHT: 62 IN | TEMPERATURE: 98.6 F | OXYGEN SATURATION: 72 % | SYSTOLIC BLOOD PRESSURE: 77 MMHG | RESPIRATION RATE: 44 BRPM | DIASTOLIC BLOOD PRESSURE: 65 MMHG | BODY MASS INDEX: 17.41 KG/M2 | WEIGHT: 94.6 LBS

## 2023-01-01 DIAGNOSIS — R13.12 OROPHARYNGEAL DYSPHAGIA: ICD-10-CM

## 2023-01-01 DIAGNOSIS — D64.9 ANEMIA, UNSPECIFIED TYPE: Primary | ICD-10-CM

## 2023-01-01 DIAGNOSIS — D50.8 OTHER IRON DEFICIENCY ANEMIA: Primary | ICD-10-CM

## 2023-01-01 DIAGNOSIS — I63.9 CEREBROVASCULAR ACCIDENT (CVA), UNSPECIFIED MECHANISM: Primary | ICD-10-CM

## 2023-01-01 DIAGNOSIS — R57.9 SHOCK, UNSPECIFIED: ICD-10-CM

## 2023-01-01 LAB
ABO GROUP BLD: NORMAL
ALBUMIN SERPL-MCNC: 3.1 G/DL (ref 3.5–5.2)
ALBUMIN SERPL-MCNC: 3.1 G/DL (ref 3.5–5.2)
ALBUMIN SERPL-MCNC: 3.5 G/DL (ref 3.5–5.2)
ALBUMIN SERPL-MCNC: 4 G/DL (ref 3.5–5.2)
ALBUMIN/GLOB SERPL: 1.1 G/DL
ALBUMIN/GLOB SERPL: 1.3 G/DL
ALBUMIN/GLOB SERPL: 1.3 G/DL
ALP SERPL-CCNC: 118 U/L (ref 39–117)
ALP SERPL-CCNC: 126 U/L (ref 39–117)
ALP SERPL-CCNC: 130 U/L (ref 39–117)
ALP SERPL-CCNC: 130 U/L (ref 39–117)
ALT SERPL W P-5'-P-CCNC: 11 U/L (ref 1–33)
ALT SERPL W P-5'-P-CCNC: 12 U/L (ref 1–33)
ALT SERPL W P-5'-P-CCNC: 20 U/L (ref 1–33)
ALT SERPL W P-5'-P-CCNC: 71 U/L (ref 1–33)
ANION GAP SERPL CALCULATED.3IONS-SCNC: 11 MMOL/L (ref 5–15)
ANION GAP SERPL CALCULATED.3IONS-SCNC: 11 MMOL/L (ref 5–15)
ANION GAP SERPL CALCULATED.3IONS-SCNC: 17 MMOL/L (ref 5–15)
ANION GAP SERPL CALCULATED.3IONS-SCNC: 18 MMOL/L (ref 5–15)
APTT PPP: 29.1 SECONDS (ref 24.1–35)
ARTERIAL PATENCY WRIST A: ABNORMAL
ARTERIAL PATENCY WRIST A: ABNORMAL
ARTERIAL PATENCY WRIST A: POSITIVE
ARTERIAL PATENCY WRIST A: POSITIVE
AST SERPL-CCNC: 116 U/L (ref 1–32)
AST SERPL-CCNC: 28 U/L (ref 1–32)
AST SERPL-CCNC: 286 U/L (ref 1–32)
AST SERPL-CCNC: 37 U/L (ref 1–32)
ATMOSPHERIC PRESS: 747 MMHG
ATMOSPHERIC PRESS: 748 MMHG
ATMOSPHERIC PRESS: 749 MMHG
ATMOSPHERIC PRESS: 749 MMHG
BACTERIA UR QL AUTO: ABNORMAL /HPF
BASE EXCESS BLDA CALC-SCNC: -1.1 MMOL/L (ref 0–2)
BASE EXCESS BLDA CALC-SCNC: -2.2 MMOL/L (ref 0–2)
BASE EXCESS BLDA CALC-SCNC: -21.5 MMOL/L (ref 0–2)
BASE EXCESS BLDA CALC-SCNC: -7 MMOL/L (ref 0–2)
BASOPHILS # BLD AUTO: 0.03 10*3/MM3 (ref 0–0.2)
BASOPHILS # BLD AUTO: 0.04 10*3/MM3 (ref 0–0.2)
BASOPHILS NFR BLD AUTO: 0.1 % (ref 0–1.5)
BASOPHILS NFR BLD AUTO: 0.1 % (ref 0–1.5)
BASOPHILS NFR BLD AUTO: 0.3 % (ref 0–1.5)
BASOPHILS NFR BLD AUTO: 0.3 % (ref 0–1.5)
BDY SITE: ABNORMAL
BH CV ECHO LEFT VENTRICLE GLOBAL LONGITUDINAL STRAIN: -10 %
BH CV ECHO MEAS - AO MAX PG: 10.5 MMHG
BH CV ECHO MEAS - AO MEAN PG: 5.7 MMHG
BH CV ECHO MEAS - AO ROOT DIAM: 2.2 CM
BH CV ECHO MEAS - AO V2 MAX: 162 CM/SEC
BH CV ECHO MEAS - AO V2 VTI: 30.4 CM
BH CV ECHO MEAS - AVA(I,D): 2.02 CM2
BH CV ECHO MEAS - EDV(CUBED): 130.3 ML
BH CV ECHO MEAS - EDV(MOD-SP2): 64.8 ML
BH CV ECHO MEAS - EDV(MOD-SP4): 173 ML
BH CV ECHO MEAS - EDV(MOD-SP4): 87.7 ML
BH CV ECHO MEAS - EF(MOD-BP): 45.4 %
BH CV ECHO MEAS - EF(MOD-SP2): 53.5 %
BH CV ECHO MEAS - EF(MOD-SP4): 17.9 %
BH CV ECHO MEAS - EF(MOD-SP4): 36.1 %
BH CV ECHO MEAS - ESV(CUBED): 57.5 ML
BH CV ECHO MEAS - ESV(MOD-SP2): 30.1 ML
BH CV ECHO MEAS - ESV(MOD-SP4): 142 ML
BH CV ECHO MEAS - ESV(MOD-SP4): 56 ML
BH CV ECHO MEAS - FS: 23.9 %
BH CV ECHO MEAS - IVS/LVPW: 1 CM
BH CV ECHO MEAS - IVSD: 0.91 CM
BH CV ECHO MEAS - LA DIMENSION: 3.2 CM
BH CV ECHO MEAS - LAT PEAK E' VEL: 6.7 CM/SEC
BH CV ECHO MEAS - LV DIASTOLIC VOL/BSA (35-75): 62.7 CM2
BH CV ECHO MEAS - LV MASS(C)D: 164.3 GRAMS
BH CV ECHO MEAS - LV MAX PG: 3.2 MMHG
BH CV ECHO MEAS - LV MEAN PG: 2 MMHG
BH CV ECHO MEAS - LV SYSTOLIC VOL/BSA (12-30): 40 CM2
BH CV ECHO MEAS - LV V1 MAX: 89 CM/SEC
BH CV ECHO MEAS - LV V1 VTI: 17.7 CM
BH CV ECHO MEAS - LVIDD: 5.1 CM
BH CV ECHO MEAS - LVIDS: 3.9 CM
BH CV ECHO MEAS - LVOT AREA: 3.5 CM2
BH CV ECHO MEAS - LVOT DIAM: 2.1 CM
BH CV ECHO MEAS - LVPWD: 0.91 CM
BH CV ECHO MEAS - MED PEAK E' VEL: 4.2 CM/SEC
BH CV ECHO MEAS - MR MAX PG: 61.5 MMHG
BH CV ECHO MEAS - MR MAX VEL: 389 CM/SEC
BH CV ECHO MEAS - MV A MAX VEL: 102 CM/SEC
BH CV ECHO MEAS - MV DEC TIME: 0.2 MSEC
BH CV ECHO MEAS - MV E MAX VEL: 86.9 CM/SEC
BH CV ECHO MEAS - MV E/A: 0.85
BH CV ECHO MEAS - PA V2 MAX: 97.7 CM/SEC
BH CV ECHO MEAS - RAP SYSTOLE: 5 MMHG
BH CV ECHO MEAS - RVSP: 29.4 MMHG
BH CV ECHO MEAS - SI(MOD-SP2): 24.8 ML/M2
BH CV ECHO MEAS - SI(MOD-SP4): 22.7 ML/M2
BH CV ECHO MEAS - SV(LVOT): 61.3 ML
BH CV ECHO MEAS - SV(MOD-SP2): 34.7 ML
BH CV ECHO MEAS - SV(MOD-SP4): 31 ML
BH CV ECHO MEAS - SV(MOD-SP4): 31.7 ML
BH CV ECHO MEAS - TAPSE (>1.6): 2.4 CM
BH CV ECHO MEAS - TR MAX PG: 24.4 MMHG
BH CV ECHO MEAS - TR MAX VEL: 247 CM/SEC
BH CV ECHO MEASUREMENTS AVERAGE E/E' RATIO: 15.94
BH CV ECHO SHUNT ASSESSMENT PERFORMED (HIDDEN SCRIPTING): 1
BH CV XLRA - TDI S': 9.5 CM/SEC
BILIRUB CONJ SERPL-MCNC: 0.2 MG/DL (ref 0–0.3)
BILIRUB INDIRECT SERPL-MCNC: 0.3 MG/DL
BILIRUB SERPL-MCNC: 0.3 MG/DL (ref 0–1.2)
BILIRUB SERPL-MCNC: 0.3 MG/DL (ref 0–1.2)
BILIRUB SERPL-MCNC: 0.4 MG/DL (ref 0–1.2)
BILIRUB SERPL-MCNC: 0.5 MG/DL (ref 0–1.2)
BILIRUB UR QL STRIP: NEGATIVE
BLD GP AB SCN SERPL QL: NEGATIVE
BODY TEMPERATURE: 37 C
BUN SERPL-MCNC: 15 MG/DL (ref 8–23)
BUN SERPL-MCNC: 16 MG/DL (ref 8–23)
BUN SERPL-MCNC: 17 MG/DL (ref 8–23)
BUN SERPL-MCNC: 25 MG/DL (ref 8–23)
BUN/CREAT SERPL: 22.5 (ref 7–25)
BUN/CREAT SERPL: 26.7 (ref 7–25)
BUN/CREAT SERPL: 27.4 (ref 7–25)
BUN/CREAT SERPL: 33.3 (ref 7–25)
CALCIUM SPEC-SCNC: 8.3 MG/DL (ref 8.6–10.5)
CALCIUM SPEC-SCNC: 8.4 MG/DL (ref 8.6–10.5)
CALCIUM SPEC-SCNC: 9 MG/DL (ref 8.6–10.5)
CALCIUM SPEC-SCNC: 9.1 MG/DL (ref 8.6–10.5)
CHLORIDE SERPL-SCNC: 102 MMOL/L (ref 98–107)
CHLORIDE SERPL-SCNC: 104 MMOL/L (ref 98–107)
CHLORIDE SERPL-SCNC: 107 MMOL/L (ref 98–107)
CHLORIDE SERPL-SCNC: 98 MMOL/L (ref 98–107)
CHOLEST SERPL-MCNC: 124 MG/DL (ref 0–200)
CLARITY UR: ABNORMAL
CO2 SERPL-SCNC: 17 MMOL/L (ref 22–29)
CO2 SERPL-SCNC: 18 MMOL/L (ref 22–29)
CO2 SERPL-SCNC: 26 MMOL/L (ref 22–29)
CO2 SERPL-SCNC: 27 MMOL/L (ref 22–29)
COLOR UR: YELLOW
CREAT SERPL-MCNC: 0.45 MG/DL (ref 0.57–1)
CREAT SERPL-MCNC: 0.6 MG/DL (ref 0.57–1)
CREAT SERPL-MCNC: 0.62 MG/DL (ref 0.57–1)
CREAT SERPL-MCNC: 1.11 MG/DL (ref 0.57–1)
D-LACTATE SERPL-SCNC: 1.9 MMOL/L (ref 0.5–2)
D-LACTATE SERPL-SCNC: 18.8 MMOL/L (ref 0.5–2)
D-LACTATE SERPL-SCNC: 8.7 MMOL/L (ref 0.5–2)
DEPRECATED RDW RBC AUTO: 55.3 FL (ref 37–54)
DEPRECATED RDW RBC AUTO: 56.4 FL (ref 37–54)
DEPRECATED RDW RBC AUTO: 57.3 FL (ref 37–54)
DEPRECATED RDW RBC AUTO: 62.4 FL (ref 37–54)
EGFRCR SERPLBLD CKD-EPI 2021: 51.3 ML/MIN/1.73
EGFRCR SERPLBLD CKD-EPI 2021: 91.9 ML/MIN/1.73
EGFRCR SERPLBLD CKD-EPI 2021: 92.6 ML/MIN/1.73
EGFRCR SERPLBLD CKD-EPI 2021: 99.2 ML/MIN/1.73
EOSINOPHIL # BLD AUTO: 0 10*3/MM3 (ref 0–0.4)
EOSINOPHIL # BLD AUTO: 0 10*3/MM3 (ref 0–0.4)
EOSINOPHIL # BLD AUTO: 0.07 10*3/MM3 (ref 0–0.4)
EOSINOPHIL # BLD AUTO: 0.2 10*3/MM3 (ref 0–0.4)
EOSINOPHIL NFR BLD AUTO: 0 % (ref 0.3–6.2)
EOSINOPHIL NFR BLD AUTO: 0 % (ref 0.3–6.2)
EOSINOPHIL NFR BLD AUTO: 0.5 % (ref 0.3–6.2)
EOSINOPHIL NFR BLD AUTO: 1.7 % (ref 0.3–6.2)
ERYTHROCYTE [DISTWIDTH] IN BLOOD BY AUTOMATED COUNT: 18.4 % (ref 12.3–15.4)
ERYTHROCYTE [DISTWIDTH] IN BLOOD BY AUTOMATED COUNT: 18.5 % (ref 12.3–15.4)
ERYTHROCYTE [DISTWIDTH] IN BLOOD BY AUTOMATED COUNT: 19.2 % (ref 12.3–15.4)
ERYTHROCYTE [DISTWIDTH] IN BLOOD BY AUTOMATED COUNT: 20.4 % (ref 12.3–15.4)
FERRITIN SERPL-MCNC: 409.6 NG/ML (ref 13–150)
GEN 5 2HR TROPONIN T REFLEX: 1407 NG/L
GEN 5 2HR TROPONIN T REFLEX: 387 NG/L
GLOBULIN UR ELPH-MCNC: 2.7 GM/DL
GLOBULIN UR ELPH-MCNC: 2.7 GM/DL
GLOBULIN UR ELPH-MCNC: 3 GM/DL
GLUCOSE BLDC GLUCOMTR-MCNC: 101 MG/DL (ref 70–130)
GLUCOSE BLDC GLUCOMTR-MCNC: 169 MG/DL (ref 70–130)
GLUCOSE SERPL-MCNC: 111 MG/DL (ref 65–99)
GLUCOSE SERPL-MCNC: 137 MG/DL (ref 65–99)
GLUCOSE SERPL-MCNC: 146 MG/DL (ref 65–99)
GLUCOSE SERPL-MCNC: 190 MG/DL (ref 65–99)
GLUCOSE UR STRIP-MCNC: ABNORMAL MG/DL
GRAN CASTS URNS QL MICRO: ABNORMAL /LPF
HBA1C MFR BLD: 4.5 % (ref 4.8–5.6)
HCO3 BLDA-SCNC: 14.1 MMOL/L (ref 20–26)
HCO3 BLDA-SCNC: 20.7 MMOL/L (ref 20–26)
HCO3 BLDA-SCNC: 22.1 MMOL/L (ref 20–26)
HCO3 BLDA-SCNC: 8.8 MMOL/L (ref 20–26)
HCT VFR BLD AUTO: 27 % (ref 34–46.6)
HCT VFR BLD AUTO: 28.5 % (ref 34–46.6)
HCT VFR BLD AUTO: 28.7 % (ref 34–46.6)
HCT VFR BLD AUTO: 29.3 % (ref 34–46.6)
HCT VFR BLD AUTO: 30.9 % (ref 34–46.6)
HCT VFR BLD AUTO: 31 % (ref 34–46.6)
HCT VFR BLD AUTO: 31.8 % (ref 34–46.6)
HDLC SERPL-MCNC: 41 MG/DL (ref 40–60)
HEMOCCULT STL QL: POSITIVE
HGB BLD-MCNC: 8 G/DL (ref 12–15.9)
HGB BLD-MCNC: 8.1 G/DL (ref 12–15.9)
HGB BLD-MCNC: 8.1 G/DL (ref 12–15.9)
HGB BLD-MCNC: 8.2 G/DL (ref 12–15.9)
HGB BLD-MCNC: 8.7 G/DL (ref 12–15.9)
HGB BLD-MCNC: 8.8 G/DL (ref 12–15.9)
HGB BLD-MCNC: 9.2 G/DL (ref 12–15.9)
HGB UR QL STRIP.AUTO: ABNORMAL
HOLD SPECIMEN: NORMAL
HYALINE CASTS UR QL AUTO: ABNORMAL /LPF
IMM GRANULOCYTES # BLD AUTO: 0.05 10*3/MM3 (ref 0–0.05)
IMM GRANULOCYTES # BLD AUTO: 0.08 10*3/MM3 (ref 0–0.05)
IMM GRANULOCYTES # BLD AUTO: 0.33 10*3/MM3 (ref 0–0.05)
IMM GRANULOCYTES NFR BLD AUTO: 0.4 % (ref 0–0.5)
IMM GRANULOCYTES NFR BLD AUTO: 0.6 % (ref 0–0.5)
IMM GRANULOCYTES NFR BLD AUTO: 1.2 % (ref 0–0.5)
INHALED O2 CONCENTRATION: 100 %
INHALED O2 CONCENTRATION: 21 %
INR PPP: 1.24 (ref 0.91–1.09)
IRON 24H UR-MRATE: 16 MCG/DL (ref 37–145)
IRON SATN MFR SERPL: 5 % (ref 20–50)
KETONES UR QL STRIP: ABNORMAL
LDLC SERPL CALC-MCNC: 65 MG/DL (ref 0–100)
LDLC/HDLC SERPL: 1.55 {RATIO}
LEFT ATRIUM VOLUME INDEX: 27.4 ML/M2
LEFT ATRIUM VOLUME: 38.4 ML
LEUKOCYTE ESTERASE UR QL STRIP.AUTO: NEGATIVE
LV EF 2D ECHO EST: 40 %
LYMPHOCYTES # BLD AUTO: 0.42 10*3/MM3 (ref 0.7–3.1)
LYMPHOCYTES # BLD AUTO: 0.59 10*3/MM3 (ref 0.7–3.1)
LYMPHOCYTES # BLD AUTO: 0.81 10*3/MM3 (ref 0.7–3.1)
LYMPHOCYTES # BLD AUTO: 0.94 10*3/MM3 (ref 0.7–3.1)
LYMPHOCYTES NFR BLD AUTO: 1.9 % (ref 19.6–45.3)
LYMPHOCYTES NFR BLD AUTO: 2.1 % (ref 19.6–45.3)
LYMPHOCYTES NFR BLD AUTO: 5.9 % (ref 19.6–45.3)
LYMPHOCYTES NFR BLD AUTO: 7.9 % (ref 19.6–45.3)
Lab: ABNORMAL
MAGNESIUM SERPL-MCNC: 1.9 MG/DL (ref 1.6–2.4)
MCH RBC QN AUTO: 23.7 PG (ref 26.6–33)
MCH RBC QN AUTO: 24.1 PG (ref 26.6–33)
MCH RBC QN AUTO: 24.2 PG (ref 26.6–33)
MCH RBC QN AUTO: 24.4 PG (ref 26.6–33)
MCHC RBC AUTO-ENTMCNC: 28.2 G/DL (ref 31.5–35.7)
MCHC RBC AUTO-ENTMCNC: 28.4 G/DL (ref 31.5–35.7)
MCHC RBC AUTO-ENTMCNC: 28.9 G/DL (ref 31.5–35.7)
MCHC RBC AUTO-ENTMCNC: 29.6 G/DL (ref 31.5–35.7)
MCV RBC AUTO: 81.6 FL (ref 79–97)
MCV RBC AUTO: 83.5 FL (ref 79–97)
MCV RBC AUTO: 84.2 FL (ref 79–97)
MCV RBC AUTO: 86.1 FL (ref 79–97)
MODALITY: ABNORMAL
MONOCYTES # BLD AUTO: 0.63 10*3/MM3 (ref 0.1–0.9)
MONOCYTES # BLD AUTO: 0.75 10*3/MM3 (ref 0.1–0.9)
MONOCYTES # BLD AUTO: 0.89 10*3/MM3 (ref 0.1–0.9)
MONOCYTES # BLD AUTO: 1.39 10*3/MM3 (ref 0.1–0.9)
MONOCYTES NFR BLD AUTO: 2.9 % (ref 5–12)
MONOCYTES NFR BLD AUTO: 5 % (ref 5–12)
MONOCYTES NFR BLD AUTO: 5.4 % (ref 5–12)
MONOCYTES NFR BLD AUTO: 7.4 % (ref 5–12)
NEUTROPHILS NFR BLD AUTO: 12.07 10*3/MM3 (ref 1.7–7)
NEUTROPHILS NFR BLD AUTO: 20.31 10*3/MM3 (ref 1.7–7)
NEUTROPHILS NFR BLD AUTO: 25.4 10*3/MM3 (ref 1.7–7)
NEUTROPHILS NFR BLD AUTO: 82.3 % (ref 42.7–76)
NEUTROPHILS NFR BLD AUTO: 87.3 % (ref 42.7–76)
NEUTROPHILS NFR BLD AUTO: 9.86 10*3/MM3 (ref 1.7–7)
NEUTROPHILS NFR BLD AUTO: 91.6 % (ref 42.7–76)
NEUTROPHILS NFR BLD AUTO: 94.2 % (ref 42.7–76)
NITRITE UR QL STRIP: NEGATIVE
NOTIFIED BY: ABNORMAL
NOTIFIED BY: ABNORMAL
NOTIFIED WHO: ABNORMAL
NOTIFIED WHO: ABNORMAL
NRBC BLD AUTO-RTO: 0 /100 WBC (ref 0–0.2)
PCO2 BLDA: 16.7 MM HG (ref 35–45)
PCO2 BLDA: 27.6 MM HG (ref 35–45)
PCO2 BLDA: 29.9 MM HG (ref 35–45)
PCO2 BLDA: 38.6 MM HG (ref 35–45)
PCO2 TEMP ADJ BLD: 16.7 MM HG (ref 35–45)
PCO2 TEMP ADJ BLD: 27.6 MM HG (ref 35–45)
PCO2 TEMP ADJ BLD: 29.9 MM HG (ref 35–45)
PCO2 TEMP ADJ BLD: 38.6 MM HG (ref 35–45)
PEEP RESPIRATORY: 5 CM[H2O]
PH BLDA: 6.96 PH UNITS (ref 7.35–7.45)
PH BLDA: 7.48 PH UNITS (ref 7.35–7.45)
PH BLDA: 7.48 PH UNITS (ref 7.35–7.45)
PH BLDA: 7.54 PH UNITS (ref 7.35–7.45)
PH UR STRIP.AUTO: <=5 [PH] (ref 5–8)
PH, TEMP CORRECTED: 6.96 PH UNITS (ref 7.35–7.45)
PH, TEMP CORRECTED: 7.48 PH UNITS (ref 7.35–7.45)
PH, TEMP CORRECTED: 7.48 PH UNITS (ref 7.35–7.45)
PH, TEMP CORRECTED: 7.54 PH UNITS (ref 7.35–7.45)
PLATELET # BLD AUTO: 158 10*3/MM3 (ref 140–450)
PLATELET # BLD AUTO: 275 10*3/MM3 (ref 140–450)
PLATELET # BLD AUTO: 286 10*3/MM3 (ref 140–450)
PLATELET # BLD AUTO: 321 10*3/MM3 (ref 140–450)
PMV BLD AUTO: 10.1 FL (ref 6–12)
PMV BLD AUTO: 8.5 FL (ref 6–12)
PMV BLD AUTO: 8.7 FL (ref 6–12)
PMV BLD AUTO: 9.1 FL (ref 6–12)
PO2 BLDA: 44.1 MM HG (ref 83–108)
PO2 BLDA: 92.8 MM HG (ref 83–108)
PO2 BLDA: 98.2 MM HG (ref 83–108)
PO2 BLDA: 98.5 MM HG (ref 83–108)
PO2 TEMP ADJ BLD: 44.1 MM HG (ref 83–108)
PO2 TEMP ADJ BLD: 92.8 MM HG (ref 83–108)
PO2 TEMP ADJ BLD: 98.2 MM HG (ref 83–108)
PO2 TEMP ADJ BLD: 98.5 MM HG (ref 83–108)
POTASSIUM SERPL-SCNC: 3 MMOL/L (ref 3.5–5.2)
POTASSIUM SERPL-SCNC: 3.2 MMOL/L (ref 3.5–5.2)
POTASSIUM SERPL-SCNC: 3.3 MMOL/L (ref 3.5–5.2)
POTASSIUM SERPL-SCNC: 3.6 MMOL/L (ref 3.5–5.2)
PROCALCITONIN SERPL-MCNC: 0.17 NG/ML (ref 0–0.25)
PROT SERPL-MCNC: 5.8 G/DL (ref 6–8.5)
PROT SERPL-MCNC: 5.9 G/DL (ref 6–8.5)
PROT SERPL-MCNC: 6.2 G/DL (ref 6–8.5)
PROT SERPL-MCNC: 7 G/DL (ref 6–8.5)
PROT UR QL STRIP: ABNORMAL
PROTHROMBIN TIME: 15.7 SECONDS (ref 11.8–14.8)
QT INTERVAL: 426 MS
QTC INTERVAL: 479 MS
RBC # BLD AUTO: 3.31 10*6/MM3 (ref 3.77–5.28)
RBC # BLD AUTO: 3.6 10*6/MM3 (ref 3.77–5.28)
RBC # BLD AUTO: 3.67 10*6/MM3 (ref 3.77–5.28)
RBC # BLD AUTO: 3.81 10*6/MM3 (ref 3.77–5.28)
RBC # UR STRIP: ABNORMAL /HPF
REF LAB TEST METHOD: ABNORMAL
RH BLD: POSITIVE
SAO2 % BLDCOA: 45.7 % (ref 94–99)
SAO2 % BLDCOA: 97.4 % (ref 94–99)
SAO2 % BLDCOA: 97.5 % (ref 94–99)
SAO2 % BLDCOA: 97.8 % (ref 94–99)
SET MECH RESP RATE: 18
SODIUM SERPL-SCNC: 136 MMOL/L (ref 136–145)
SODIUM SERPL-SCNC: 139 MMOL/L (ref 136–145)
SODIUM SERPL-SCNC: 139 MMOL/L (ref 136–145)
SODIUM SERPL-SCNC: 142 MMOL/L (ref 136–145)
SP GR UR STRIP: >1.03 (ref 1–1.03)
SQUAMOUS #/AREA URNS HPF: ABNORMAL /HPF
T&S EXPIRATION DATE: NORMAL
TIBC SERPL-MCNC: 308 MCG/DL (ref 298–536)
TRANSFERRIN SERPL-MCNC: 207 MG/DL (ref 200–360)
TRIGL SERPL-MCNC: 97 MG/DL (ref 0–150)
TROPONIN T DELTA: 233 NG/L
TROPONIN T DELTA: 47 NG/L
TROPONIN T SERPL HS-MCNC: 1174 NG/L
TROPONIN T SERPL HS-MCNC: 340 NG/L
UROBILINOGEN UR QL STRIP: ABNORMAL
VENTILATOR MODE: ABNORMAL
VENTILATOR MODE: AC
VLDLC SERPL-MCNC: 18 MG/DL (ref 5–40)
VT ON VENT VENT: 400 ML
WBC # UR STRIP: ABNORMAL /HPF
WBC NRBC COR # BLD: 11.97 10*3/MM3 (ref 3.4–10.8)
WBC NRBC COR # BLD: 13.82 10*3/MM3 (ref 3.4–10.8)
WBC NRBC COR # BLD: 21.59 10*3/MM3 (ref 3.4–10.8)
WBC NRBC COR # BLD: 27.74 10*3/MM3 (ref 3.4–10.8)
WHOLE BLOOD HOLD COAG: NORMAL
WHOLE BLOOD HOLD SPECIMEN: NORMAL

## 2023-01-01 PROCEDURE — 25010000002 IRON SUCROSE PER 1 MG: Performed by: NURSE PRACTITIONER

## 2023-01-01 PROCEDURE — 25010000002 ONDANSETRON PER 1 MG

## 2023-01-01 PROCEDURE — 85018 HEMOGLOBIN: CPT | Performed by: FAMILY MEDICINE

## 2023-01-01 PROCEDURE — 93010 ELECTROCARDIOGRAM REPORT: CPT | Performed by: EMERGENCY MEDICINE

## 2023-01-01 PROCEDURE — 70498 CT ANGIOGRAPHY NECK: CPT

## 2023-01-01 PROCEDURE — 93356 MYOCRD STRAIN IMG SPCKL TRCK: CPT | Performed by: INTERNAL MEDICINE

## 2023-01-01 PROCEDURE — 99232 SBSQ HOSP IP/OBS MODERATE 35: CPT | Performed by: STUDENT IN AN ORGANIZED HEALTH CARE EDUCATION/TRAINING PROGRAM

## 2023-01-01 PROCEDURE — 85018 HEMOGLOBIN: CPT | Performed by: STUDENT IN AN ORGANIZED HEALTH CARE EDUCATION/TRAINING PROGRAM

## 2023-01-01 PROCEDURE — 93306 TTE W/DOPPLER COMPLETE: CPT | Performed by: INTERNAL MEDICINE

## 2023-01-01 PROCEDURE — 82948 REAGENT STRIP/BLOOD GLUCOSE: CPT

## 2023-01-01 PROCEDURE — 83036 HEMOGLOBIN GLYCOSYLATED A1C: CPT | Performed by: PSYCHIATRY & NEUROLOGY

## 2023-01-01 PROCEDURE — 70496 CT ANGIOGRAPHY HEAD: CPT

## 2023-01-01 PROCEDURE — 83605 ASSAY OF LACTIC ACID: CPT | Performed by: STUDENT IN AN ORGANIZED HEALTH CARE EDUCATION/TRAINING PROGRAM

## 2023-01-01 PROCEDURE — 3E03317 INTRODUCTION OF OTHER THROMBOLYTIC INTO PERIPHERAL VEIN, PERCUTANEOUS APPROACH: ICD-10-PCS | Performed by: PSYCHIATRY & NEUROLOGY

## 2023-01-01 PROCEDURE — 92610 EVALUATE SWALLOWING FUNCTION: CPT | Performed by: SPEECH-LANGUAGE PATHOLOGIST

## 2023-01-01 PROCEDURE — 85025 COMPLETE CBC W/AUTO DIFF WBC: CPT | Performed by: FAMILY MEDICINE

## 2023-01-01 PROCEDURE — 85014 HEMATOCRIT: CPT | Performed by: STUDENT IN AN ORGANIZED HEALTH CARE EDUCATION/TRAINING PROGRAM

## 2023-01-01 PROCEDURE — 25510000001 IOPAMIDOL PER 1 ML: Performed by: STUDENT IN AN ORGANIZED HEALTH CARE EDUCATION/TRAINING PROGRAM

## 2023-01-01 PROCEDURE — 83605 ASSAY OF LACTIC ACID: CPT | Performed by: INTERNAL MEDICINE

## 2023-01-01 PROCEDURE — 71045 X-RAY EXAM CHEST 1 VIEW: CPT

## 2023-01-01 PROCEDURE — 80061 LIPID PANEL: CPT | Performed by: PSYCHIATRY & NEUROLOGY

## 2023-01-01 PROCEDURE — 85730 THROMBOPLASTIN TIME PARTIAL: CPT | Performed by: STUDENT IN AN ORGANIZED HEALTH CARE EDUCATION/TRAINING PROGRAM

## 2023-01-01 PROCEDURE — 0BH17EZ INSERTION OF ENDOTRACHEAL AIRWAY INTO TRACHEA, VIA NATURAL OR ARTIFICIAL OPENING: ICD-10-PCS | Performed by: FAMILY MEDICINE

## 2023-01-01 PROCEDURE — 86901 BLOOD TYPING SEROLOGIC RH(D): CPT | Performed by: STUDENT IN AN ORGANIZED HEALTH CARE EDUCATION/TRAINING PROGRAM

## 2023-01-01 PROCEDURE — 25010000002 AMIODARONE IN DEXTROSE 5% 360-4.14 MG/200ML-% SOLUTION: Performed by: INTERNAL MEDICINE

## 2023-01-01 PROCEDURE — 94799 UNLISTED PULMONARY SVC/PX: CPT

## 2023-01-01 PROCEDURE — 82728 ASSAY OF FERRITIN: CPT

## 2023-01-01 PROCEDURE — 86850 RBC ANTIBODY SCREEN: CPT | Performed by: STUDENT IN AN ORGANIZED HEALTH CARE EDUCATION/TRAINING PROGRAM

## 2023-01-01 PROCEDURE — 36415 COLL VENOUS BLD VENIPUNCTURE: CPT

## 2023-01-01 PROCEDURE — 25010000002 PIPERACILLIN SOD-TAZOBACTAM PER 1 G: Performed by: STUDENT IN AN ORGANIZED HEALTH CARE EDUCATION/TRAINING PROGRAM

## 2023-01-01 PROCEDURE — 80076 HEPATIC FUNCTION PANEL: CPT | Performed by: INTERNAL MEDICINE

## 2023-01-01 PROCEDURE — 84484 ASSAY OF TROPONIN QUANT: CPT | Performed by: STUDENT IN AN ORGANIZED HEALTH CARE EDUCATION/TRAINING PROGRAM

## 2023-01-01 PROCEDURE — 82803 BLOOD GASES ANY COMBINATION: CPT

## 2023-01-01 PROCEDURE — 96375 TX/PRO/DX INJ NEW DRUG ADDON: CPT

## 2023-01-01 PROCEDURE — 93308 TTE F-UP OR LMTD: CPT | Performed by: INTERNAL MEDICINE

## 2023-01-01 PROCEDURE — 99233 SBSQ HOSP IP/OBS HIGH 50: CPT | Performed by: STUDENT IN AN ORGANIZED HEALTH CARE EDUCATION/TRAINING PROGRAM

## 2023-01-01 PROCEDURE — 25010000002 TENECTEPLASE PER 50 MG: Performed by: PSYCHIATRY & NEUROLOGY

## 2023-01-01 PROCEDURE — 93005 ELECTROCARDIOGRAM TRACING: CPT | Performed by: INTERNAL MEDICINE

## 2023-01-01 PROCEDURE — 25010000002 PROPOFOL 10 MG/ML EMULSION: Performed by: FAMILY MEDICINE

## 2023-01-01 PROCEDURE — 85014 HEMATOCRIT: CPT | Performed by: FAMILY MEDICINE

## 2023-01-01 PROCEDURE — 36600 WITHDRAWAL OF ARTERIAL BLOOD: CPT

## 2023-01-01 PROCEDURE — 86900 BLOOD TYPING SEROLOGIC ABO: CPT | Performed by: STUDENT IN AN ORGANIZED HEALTH CARE EDUCATION/TRAINING PROGRAM

## 2023-01-01 PROCEDURE — 74018 RADEX ABDOMEN 1 VIEW: CPT

## 2023-01-01 PROCEDURE — 84145 PROCALCITONIN (PCT): CPT | Performed by: FAMILY MEDICINE

## 2023-01-01 PROCEDURE — 31500 INSERT EMERGENCY AIRWAY: CPT | Performed by: FAMILY MEDICINE

## 2023-01-01 PROCEDURE — 93356 MYOCRD STRAIN IMG SPCKL TRCK: CPT

## 2023-01-01 PROCEDURE — 63710000001 ACETAMINOPHEN 325 MG TABLET: Performed by: NURSE PRACTITIONER

## 2023-01-01 PROCEDURE — 25010000002 VASOPRESSIN 20 UNIT/ML SOLUTION 1 ML VIAL: Performed by: FAMILY MEDICINE

## 2023-01-01 PROCEDURE — 0042T HC CT CEREBRAL PERFUSION W/WO CONTRAST: CPT

## 2023-01-01 PROCEDURE — 82272 OCCULT BLD FECES 1-3 TESTS: CPT | Performed by: FAMILY MEDICINE

## 2023-01-01 PROCEDURE — 25510000001 PERFLUTREN (DEFINITY) 8.476 MG IN SODIUM CHLORIDE (PF) 0.9 % 10 ML INJECTION: Performed by: FAMILY MEDICINE

## 2023-01-01 PROCEDURE — 99291 CRITICAL CARE FIRST HOUR: CPT | Performed by: INTERNAL MEDICINE

## 2023-01-01 PROCEDURE — 83735 ASSAY OF MAGNESIUM: CPT | Performed by: STUDENT IN AN ORGANIZED HEALTH CARE EDUCATION/TRAINING PROGRAM

## 2023-01-01 PROCEDURE — 5A1935Z RESPIRATORY VENTILATION, LESS THAN 24 CONSECUTIVE HOURS: ICD-10-PCS | Performed by: FAMILY MEDICINE

## 2023-01-01 PROCEDURE — 85025 COMPLETE CBC W/AUTO DIFF WBC: CPT | Performed by: STUDENT IN AN ORGANIZED HEALTH CARE EDUCATION/TRAINING PROGRAM

## 2023-01-01 PROCEDURE — 94761 N-INVAS EAR/PLS OXIMETRY MLT: CPT

## 2023-01-01 PROCEDURE — A9270 NON-COVERED ITEM OR SERVICE: HCPCS | Performed by: NURSE PRACTITIONER

## 2023-01-01 PROCEDURE — 99222 1ST HOSP IP/OBS MODERATE 55: CPT | Performed by: INTERNAL MEDICINE

## 2023-01-01 PROCEDURE — 80053 COMPREHEN METABOLIC PANEL: CPT

## 2023-01-01 PROCEDURE — 96374 THER/PROPH/DIAG INJ IV PUSH: CPT

## 2023-01-01 PROCEDURE — 0 POTASSIUM CHLORIDE 10 MEQ/100ML SOLUTION: Performed by: INTERNAL MEDICINE

## 2023-01-01 PROCEDURE — 93010 ELECTROCARDIOGRAM REPORT: CPT | Performed by: HOSPITALIST

## 2023-01-01 PROCEDURE — 99223 1ST HOSP IP/OBS HIGH 75: CPT | Performed by: INTERNAL MEDICINE

## 2023-01-01 PROCEDURE — 93005 ELECTROCARDIOGRAM TRACING: CPT | Performed by: FAMILY MEDICINE

## 2023-01-01 PROCEDURE — 02HV33Z INSERTION OF INFUSION DEVICE INTO SUPERIOR VENA CAVA, PERCUTANEOUS APPROACH: ICD-10-PCS | Performed by: INTERNAL MEDICINE

## 2023-01-01 PROCEDURE — 85025 COMPLETE CBC W/AUTO DIFF WBC: CPT

## 2023-01-01 PROCEDURE — 0 POTASSIUM CHLORIDE 10 MEQ/100ML SOLUTION: Performed by: STUDENT IN AN ORGANIZED HEALTH CARE EDUCATION/TRAINING PROGRAM

## 2023-01-01 PROCEDURE — 87040 BLOOD CULTURE FOR BACTERIA: CPT | Performed by: STUDENT IN AN ORGANIZED HEALTH CARE EDUCATION/TRAINING PROGRAM

## 2023-01-01 PROCEDURE — 80053 COMPREHEN METABOLIC PANEL: CPT | Performed by: STUDENT IN AN ORGANIZED HEALTH CARE EDUCATION/TRAINING PROGRAM

## 2023-01-01 PROCEDURE — 25010000002 AMIODARONE IN DEXTROSE 5% 150-4.21 MG/100ML-% SOLUTION: Performed by: INTERNAL MEDICINE

## 2023-01-01 PROCEDURE — 25010000002 PHENYLEPHRINE 10 MG/ML SOLUTION 5 ML VIAL: Performed by: FAMILY MEDICINE

## 2023-01-01 PROCEDURE — 83540 ASSAY OF IRON: CPT

## 2023-01-01 PROCEDURE — 93306 TTE W/DOPPLER COMPLETE: CPT

## 2023-01-01 PROCEDURE — 93308 TTE F-UP OR LMTD: CPT

## 2023-01-01 PROCEDURE — 85610 PROTHROMBIN TIME: CPT | Performed by: STUDENT IN AN ORGANIZED HEALTH CARE EDUCATION/TRAINING PROGRAM

## 2023-01-01 PROCEDURE — 70450 CT HEAD/BRAIN W/O DYE: CPT

## 2023-01-01 PROCEDURE — 94002 VENT MGMT INPAT INIT DAY: CPT

## 2023-01-01 PROCEDURE — 70551 MRI BRAIN STEM W/O DYE: CPT

## 2023-01-01 PROCEDURE — 93005 ELECTROCARDIOGRAM TRACING: CPT | Performed by: STUDENT IN AN ORGANIZED HEALTH CARE EDUCATION/TRAINING PROGRAM

## 2023-01-01 PROCEDURE — 80048 BASIC METABOLIC PNL TOTAL CA: CPT | Performed by: FAMILY MEDICINE

## 2023-01-01 PROCEDURE — 81001 URINALYSIS AUTO W/SCOPE: CPT | Performed by: FAMILY MEDICINE

## 2023-01-01 PROCEDURE — 93010 ELECTROCARDIOGRAM REPORT: CPT | Performed by: INTERNAL MEDICINE

## 2023-01-01 PROCEDURE — 99285 EMERGENCY DEPT VISIT HI MDM: CPT

## 2023-01-01 PROCEDURE — 84466 ASSAY OF TRANSFERRIN: CPT

## 2023-01-01 PROCEDURE — 25010000002 THIAMINE PER 100 MG: Performed by: FAMILY MEDICINE

## 2023-01-01 RX ORDER — POTASSIUM CHLORIDE 7.45 MG/ML
10 INJECTION INTRAVENOUS
Status: COMPLETED | OUTPATIENT
Start: 2023-01-01 | End: 2023-01-01

## 2023-01-01 RX ORDER — ATORVASTATIN CALCIUM 40 MG/1
40 TABLET, FILM COATED ORAL NIGHTLY
Status: DISCONTINUED | OUTPATIENT
Start: 2023-01-01 | End: 2023-01-01

## 2023-01-01 RX ORDER — ONDANSETRON 2 MG/ML
INJECTION INTRAMUSCULAR; INTRAVENOUS
Status: COMPLETED
Start: 2023-01-01 | End: 2023-01-01

## 2023-01-01 RX ORDER — LORAZEPAM 2 MG/ML
2 INJECTION INTRAMUSCULAR ONCE
Status: DISCONTINUED | OUTPATIENT
Start: 2023-01-01 | End: 2023-01-01 | Stop reason: HOSPADM

## 2023-01-01 RX ORDER — ESCITALOPRAM OXALATE 10 MG/1
10 TABLET ORAL AS NEEDED
Status: ON HOLD | COMMUNITY
End: 2023-01-01

## 2023-01-01 RX ORDER — CHLORHEXIDINE GLUCONATE 0.12 MG/ML
15 RINSE ORAL EVERY 12 HOURS SCHEDULED
Status: DISCONTINUED | OUTPATIENT
Start: 2023-01-01 | End: 2023-01-01

## 2023-01-01 RX ORDER — METOPROLOL SUCCINATE 50 MG/1
50 TABLET, EXTENDED RELEASE ORAL DAILY
COMMUNITY

## 2023-01-01 RX ORDER — ONDANSETRON 2 MG/ML
4 INJECTION INTRAMUSCULAR; INTRAVENOUS EVERY 6 HOURS PRN
Status: DISCONTINUED | OUTPATIENT
Start: 2023-01-01 | End: 2023-01-01

## 2023-01-01 RX ORDER — LORAZEPAM 2 MG/ML
1 INJECTION INTRAMUSCULAR ONCE AS NEEDED
Status: DISCONTINUED | OUTPATIENT
Start: 2023-01-01 | End: 2023-01-01

## 2023-01-01 RX ORDER — ACETAMINOPHEN 325 MG/1
650 TABLET ORAL ONCE
Status: COMPLETED | OUTPATIENT
Start: 2023-01-01 | End: 2023-01-01

## 2023-01-01 RX ORDER — PANTOPRAZOLE SODIUM 40 MG/10ML
40 INJECTION, POWDER, LYOPHILIZED, FOR SOLUTION INTRAVENOUS EVERY 12 HOURS SCHEDULED
Status: DISCONTINUED | OUTPATIENT
Start: 2023-01-01 | End: 2023-01-01

## 2023-01-01 RX ORDER — METHYLPREDNISOLONE SODIUM SUCCINATE 40 MG/ML
40 INJECTION, POWDER, LYOPHILIZED, FOR SOLUTION INTRAMUSCULAR; INTRAVENOUS EVERY 8 HOURS
Status: DISCONTINUED | OUTPATIENT
Start: 2023-01-01 | End: 2023-01-01

## 2023-01-01 RX ORDER — DIPHENHYDRAMINE HYDROCHLORIDE 50 MG/ML
50 INJECTION INTRAMUSCULAR; INTRAVENOUS AS NEEDED
Status: DISCONTINUED | OUTPATIENT
Start: 2023-01-01 | End: 2023-01-01 | Stop reason: HOSPADM

## 2023-01-01 RX ORDER — OXYCODONE HYDROCHLORIDE 15 MG/1
15 TABLET, FILM COATED, EXTENDED RELEASE ORAL EVERY 12 HOURS SCHEDULED
COMMUNITY

## 2023-01-01 RX ORDER — MIDAZOLAM HYDROCHLORIDE 1 MG/ML
INJECTION INTRAMUSCULAR; INTRAVENOUS
Status: DISCONTINUED
Start: 2023-01-01 | End: 2023-01-01 | Stop reason: HOSPADM

## 2023-01-01 RX ORDER — SODIUM CHLORIDE 9 MG/ML
125 INJECTION, SOLUTION INTRAVENOUS CONTINUOUS
Status: DISCONTINUED | OUTPATIENT
Start: 2023-01-01 | End: 2023-01-01

## 2023-01-01 RX ORDER — POTASSIUM CHLORIDE 7.45 MG/ML
10 INJECTION INTRAVENOUS
Status: DISCONTINUED | OUTPATIENT
Start: 2023-01-01 | End: 2023-01-01 | Stop reason: HOSPADM

## 2023-01-01 RX ORDER — SODIUM CHLORIDE 9 MG/ML
40 INJECTION, SOLUTION INTRAVENOUS AS NEEDED
Status: DISCONTINUED | OUTPATIENT
Start: 2023-01-01 | End: 2023-01-01

## 2023-01-01 RX ORDER — SODIUM CHLORIDE 0.9 % (FLUSH) 0.9 %
10 SYRINGE (ML) INJECTION AS NEEDED
Status: DISCONTINUED | OUTPATIENT
Start: 2023-01-01 | End: 2023-01-01

## 2023-01-01 RX ORDER — ACETAMINOPHEN 325 MG/1
650 TABLET ORAL EVERY 6 HOURS PRN
Status: DISCONTINUED | OUTPATIENT
Start: 2023-01-01 | End: 2023-01-01

## 2023-01-01 RX ORDER — FAMOTIDINE 10 MG/ML
20 INJECTION, SOLUTION INTRAVENOUS AS NEEDED
Status: DISCONTINUED | OUTPATIENT
Start: 2023-01-01 | End: 2023-01-01 | Stop reason: HOSPADM

## 2023-01-01 RX ORDER — SODIUM CHLORIDE 0.9 % (FLUSH) 0.9 %
10 SYRINGE (ML) INJECTION EVERY 12 HOURS SCHEDULED
Status: DISCONTINUED | OUTPATIENT
Start: 2023-01-01 | End: 2023-01-01

## 2023-01-01 RX ORDER — CHLORHEXIDINE GLUCONATE 500 MG/1
1 CLOTH TOPICAL EVERY 24 HOURS
Status: DISCONTINUED | OUTPATIENT
Start: 2023-01-01 | End: 2023-01-01

## 2023-01-01 RX ORDER — SODIUM CHLORIDE 0.9 % (FLUSH) 0.9 %
10 SYRINGE (ML) INJECTION ONCE
Status: COMPLETED | OUTPATIENT
Start: 2023-01-01 | End: 2023-01-01

## 2023-01-01 RX ORDER — CHLORHEXIDINE GLUCONATE 500 MG/1
1 CLOTH TOPICAL ONCE
Status: COMPLETED | OUTPATIENT
Start: 2023-01-01 | End: 2023-01-01

## 2023-01-01 RX ORDER — ATORVASTATIN CALCIUM 40 MG/1
80 TABLET, FILM COATED ORAL NIGHTLY
Status: DISCONTINUED | OUTPATIENT
Start: 2023-01-01 | End: 2023-01-01

## 2023-01-01 RX ORDER — NOREPINEPHRINE BITARTRATE 0.03 MG/ML
.02-.3 INJECTION, SOLUTION INTRAVENOUS
Status: DISCONTINUED | OUTPATIENT
Start: 2023-01-01 | End: 2023-01-01

## 2023-01-01 RX ORDER — SODIUM CHLORIDE 0.9 % (FLUSH) 0.9 %
10 SYRINGE (ML) INJECTION
Status: DISPENSED | OUTPATIENT
Start: 2023-01-01 | End: 2023-01-01

## 2023-01-01 RX ORDER — FAMOTIDINE 10 MG/ML
20 INJECTION, SOLUTION INTRAVENOUS ONCE
Status: COMPLETED | OUTPATIENT
Start: 2023-01-01 | End: 2023-01-01

## 2023-01-01 RX ORDER — ASPIRIN 325 MG
325 TABLET ORAL DAILY
Status: DISCONTINUED | OUTPATIENT
Start: 2023-01-01 | End: 2023-01-01

## 2023-01-01 RX ORDER — ASPIRIN 300 MG/1
300 SUPPOSITORY RECTAL DAILY
Status: DISCONTINUED | OUTPATIENT
Start: 2023-01-01 | End: 2023-01-01

## 2023-01-01 RX ORDER — SODIUM CHLORIDE 9 MG/ML
250 INJECTION, SOLUTION INTRAVENOUS ONCE
Status: COMPLETED | OUTPATIENT
Start: 2023-01-01 | End: 2023-01-01

## 2023-01-01 RX ORDER — NALOXONE HYDROCHLORIDE 4 MG/.1ML
1 SPRAY NASAL AS NEEDED
COMMUNITY

## 2023-01-01 RX ORDER — MIDAZOLAM HYDROCHLORIDE 1 MG/ML
1 INJECTION INTRAMUSCULAR; INTRAVENOUS ONCE
Status: DISCONTINUED | OUTPATIENT
Start: 2023-01-01 | End: 2023-01-01 | Stop reason: HOSPADM

## 2023-01-01 RX ADMIN — SODIUM CHLORIDE 1000 ML: 9 INJECTION, SOLUTION INTRAVENOUS at 00:35

## 2023-01-01 RX ADMIN — CHLORHEXIDINE GLUCONATE 1 APPLICATION: 500 CLOTH TOPICAL at 03:22

## 2023-01-01 RX ADMIN — Medication 1 APPLICATION: at 10:45

## 2023-01-01 RX ADMIN — PIPERACILLIN SODIUM AND TAZOBACTAM SODIUM 3.38 G: 3; .375 INJECTION, SOLUTION INTRAVENOUS at 14:49

## 2023-01-01 RX ADMIN — PROPOFOL 5 MCG/KG/MIN: 10 INJECTION, EMULSION INTRAVENOUS at 14:32

## 2023-01-01 RX ADMIN — ONDANSETRON 4 MG: 2 INJECTION INTRAMUSCULAR; INTRAVENOUS at 07:16

## 2023-01-01 RX ADMIN — PIPERACILLIN SODIUM AND TAZOBACTAM SODIUM 3.38 G: 3; .375 INJECTION, SOLUTION INTRAVENOUS at 22:06

## 2023-01-01 RX ADMIN — PIPERACILLIN SODIUM AND TAZOBACTAM SODIUM 3.38 G: 3; .375 INJECTION, SOLUTION INTRAVENOUS at 06:18

## 2023-01-01 RX ADMIN — CHLORHEXIDINE GLUCONATE 1 APPLICATION: 500 CLOTH TOPICAL at 11:13

## 2023-01-01 RX ADMIN — AMIODARONE HYDROCHLORIDE 150 MG: 1.5 INJECTION, SOLUTION INTRAVENOUS at 11:10

## 2023-01-01 RX ADMIN — ATORVASTATIN CALCIUM 40 MG: 40 TABLET, FILM COATED ORAL at 20:07

## 2023-01-01 RX ADMIN — SODIUM CHLORIDE 75 ML/HR: 9 INJECTION, SOLUTION INTRAVENOUS at 11:12

## 2023-01-01 RX ADMIN — POTASSIUM CHLORIDE 10 MEQ: 7.46 INJECTION, SOLUTION INTRAVENOUS at 14:29

## 2023-01-01 RX ADMIN — PIPERACILLIN SODIUM AND TAZOBACTAM SODIUM 3.38 G: 3; .375 INJECTION, SOLUTION INTRAVENOUS at 11:13

## 2023-01-01 RX ADMIN — PANTOPRAZOLE SODIUM 40 MG: 40 INJECTION, POWDER, FOR SOLUTION INTRAVENOUS at 10:45

## 2023-01-01 RX ADMIN — PHENYLEPHRINE HYDROCHLORIDE 0.5 MCG/KG/MIN: 10 INJECTION INTRAVENOUS at 14:31

## 2023-01-01 RX ADMIN — CHLORHEXIDINE GLUCONATE 15 ML: 1.2 RINSE ORAL at 14:04

## 2023-01-01 RX ADMIN — POTASSIUM CHLORIDE 10 MEQ: 7.46 INJECTION, SOLUTION INTRAVENOUS at 11:03

## 2023-01-01 RX ADMIN — PANTOPRAZOLE SODIUM 40 MG: 40 INJECTION, POWDER, FOR SOLUTION INTRAVENOUS at 22:02

## 2023-01-01 RX ADMIN — PANTOPRAZOLE SODIUM 40 MG: 40 INJECTION, POWDER, FOR SOLUTION INTRAVENOUS at 20:07

## 2023-01-01 RX ADMIN — POTASSIUM CHLORIDE 10 MEQ: 7.46 INJECTION, SOLUTION INTRAVENOUS at 02:06

## 2023-01-01 RX ADMIN — IOPAMIDOL 125 ML: 755 INJECTION, SOLUTION INTRAVENOUS at 22:59

## 2023-01-01 RX ADMIN — SODIUM CHLORIDE 75 ML/HR: 9 INJECTION, SOLUTION INTRAVENOUS at 06:17

## 2023-01-01 RX ADMIN — Medication 1 APPLICATION: at 22:02

## 2023-01-01 RX ADMIN — POTASSIUM CHLORIDE 10 MEQ: 7.46 INJECTION, SOLUTION INTRAVENOUS at 02:57

## 2023-01-01 RX ADMIN — ACETAMINOPHEN 650 MG: 325 TABLET ORAL at 13:42

## 2023-01-01 RX ADMIN — VASOPRESSIN 0.03 UNITS/MIN: 20 INJECTION INTRAVENOUS at 13:40

## 2023-01-01 RX ADMIN — IRON SUCROSE 200 MG: 20 INJECTION, SOLUTION INTRAVENOUS at 13:15

## 2023-01-01 RX ADMIN — TENECTEPLASE 11 MG: KIT at 23:29

## 2023-01-01 RX ADMIN — AMIODARONE HYDROCHLORIDE 1 MG/MIN: 1.8 INJECTION, SOLUTION INTRAVENOUS at 11:29

## 2023-01-01 RX ADMIN — POTASSIUM CHLORIDE 10 MEQ: 7.46 INJECTION, SOLUTION INTRAVENOUS at 00:35

## 2023-01-01 RX ADMIN — PANTOPRAZOLE SODIUM 40 MG: 40 INJECTION, POWDER, FOR SOLUTION INTRAVENOUS at 08:54

## 2023-01-01 RX ADMIN — FAMOTIDINE 20 MG: 10 INJECTION INTRAVENOUS at 13:13

## 2023-01-01 RX ADMIN — PERFLUTREN 2 ML: 6.52 INJECTION, SUSPENSION INTRAVENOUS at 10:13

## 2023-01-01 RX ADMIN — SODIUM CHLORIDE 250 ML: 9 INJECTION, SOLUTION INTRAVENOUS at 13:13

## 2023-01-01 RX ADMIN — THIAMINE HYDROCHLORIDE 100 MG: 100 INJECTION, SOLUTION INTRAMUSCULAR; INTRAVENOUS at 13:39

## 2023-01-01 RX ADMIN — Medication 1 APPLICATION: at 11:16

## 2023-01-01 RX ADMIN — NOREPINEPHRINE BITARTRATE 0.02 MCG/KG/MIN: 0.03 INJECTION, SOLUTION INTRAVENOUS at 13:39

## 2023-01-01 RX ADMIN — ACETAMINOPHEN 650 MG: 325 TABLET, FILM COATED ORAL at 13:13

## 2023-01-01 RX ADMIN — Medication 10 ML: at 00:48

## 2023-01-06 ENCOUNTER — TELEPHONE (OUTPATIENT)
Dept: CARDIOLOGY | Facility: CLINIC | Age: 78
End: 2023-01-06
Payer: COMMERCIAL

## 2023-01-06 NOTE — TELEPHONE ENCOUNTER
Patient's [employer]Log[employer] Heart Failure Index is elevated at 21 and has been trending up since approximately December 19.   Patient reports daily weights that are stable and denies edema.  Patient denies dyspnea, orthopnea, PND, fatigue, chest pain.  Reports medication compliance.     Patient would like to know if she is taking the correct medications and if she needs to stop any of her medications since she has started taking Entresto.  She wants to make sure it is safe to take all of the medications she is taking together.  RN will ask Dr. Gordillo and will contact her with his response.

## 2023-01-08 DIAGNOSIS — I50.22 CHRONIC HFREF (HEART FAILURE WITH REDUCED EJECTION FRACTION): Primary | ICD-10-CM

## 2023-01-09 PROCEDURE — 93295 DEV INTERROG REMOTE 1/2/MLT: CPT | Performed by: INTERNAL MEDICINE

## 2023-01-09 PROCEDURE — 93296 REM INTERROG EVL PM/IDS: CPT | Performed by: INTERNAL MEDICINE

## 2023-01-09 NOTE — TELEPHONE ENCOUNTER
Patient notified that she needs labs obtained.  She sees Dr. Gordillo tomorrow morning at 10:00 and will have labs drawn before her appointment.

## 2023-01-10 ENCOUNTER — OFFICE VISIT (OUTPATIENT)
Dept: CARDIOLOGY | Facility: CLINIC | Age: 78
End: 2023-01-10
Payer: MEDICARE

## 2023-01-10 ENCOUNTER — LAB (OUTPATIENT)
Dept: LAB | Facility: HOSPITAL | Age: 78
End: 2023-01-10
Payer: MEDICARE

## 2023-01-10 VITALS
DIASTOLIC BLOOD PRESSURE: 60 MMHG | HEIGHT: 62 IN | SYSTOLIC BLOOD PRESSURE: 120 MMHG | WEIGHT: 103 LBS | HEART RATE: 106 BPM | BODY MASS INDEX: 18.95 KG/M2

## 2023-01-10 DIAGNOSIS — I10 PRIMARY HYPERTENSION: ICD-10-CM

## 2023-01-10 DIAGNOSIS — Z95.810 AICD (AUTOMATIC CARDIOVERTER/DEFIBRILLATOR) PRESENT: ICD-10-CM

## 2023-01-10 DIAGNOSIS — I50.22 CHRONIC SYSTOLIC HEART FAILURE: Primary | ICD-10-CM

## 2023-01-10 DIAGNOSIS — I50.22 CHRONIC HFREF (HEART FAILURE WITH REDUCED EJECTION FRACTION): ICD-10-CM

## 2023-01-10 LAB
ALBUMIN SERPL-MCNC: 3.3 G/DL (ref 3.5–5.2)
ALBUMIN/GLOB SERPL: 1 G/DL
ALP SERPL-CCNC: 130 U/L (ref 39–117)
ALT SERPL W P-5'-P-CCNC: 8 U/L (ref 1–33)
ANION GAP SERPL CALCULATED.3IONS-SCNC: 9 MMOL/L (ref 5–15)
AST SERPL-CCNC: 18 U/L (ref 1–32)
BILIRUB SERPL-MCNC: <0.2 MG/DL (ref 0–1.2)
BUN SERPL-MCNC: 20 MG/DL (ref 8–23)
BUN/CREAT SERPL: 30.3 (ref 7–25)
CALCIUM SPEC-SCNC: 8.1 MG/DL (ref 8.6–10.5)
CHLORIDE SERPL-SCNC: 105 MMOL/L (ref 98–107)
CO2 SERPL-SCNC: 25 MMOL/L (ref 22–29)
CREAT SERPL-MCNC: 0.66 MG/DL (ref 0.57–1)
EGFRCR SERPLBLD CKD-EPI 2021: 90.5 ML/MIN/1.73
GLOBULIN UR ELPH-MCNC: 3.4 GM/DL
GLUCOSE SERPL-MCNC: 113 MG/DL (ref 65–99)
NT-PROBNP SERPL-MCNC: 425.9 PG/ML (ref 0–1800)
POTASSIUM SERPL-SCNC: 4.9 MMOL/L (ref 3.5–5.2)
PROT SERPL-MCNC: 6.7 G/DL (ref 6–8.5)
SODIUM SERPL-SCNC: 139 MMOL/L (ref 136–145)

## 2023-01-10 PROCEDURE — 93000 ELECTROCARDIOGRAM COMPLETE: CPT | Performed by: INTERNAL MEDICINE

## 2023-01-10 PROCEDURE — 83880 ASSAY OF NATRIURETIC PEPTIDE: CPT

## 2023-01-10 PROCEDURE — 80053 COMPREHEN METABOLIC PANEL: CPT

## 2023-01-10 PROCEDURE — 99214 OFFICE O/P EST MOD 30 MIN: CPT | Performed by: INTERNAL MEDICINE

## 2023-01-10 PROCEDURE — 36415 COLL VENOUS BLD VENIPUNCTURE: CPT

## 2023-01-10 RX ORDER — SACUBITRIL AND VALSARTAN 49; 51 MG/1; MG/1
1 TABLET, FILM COATED ORAL 2 TIMES DAILY
Qty: 60 TABLET | Refills: 11 | Status: SHIPPED | OUTPATIENT
Start: 2023-01-10 | End: 2023-02-22 | Stop reason: DRUGHIGH

## 2023-01-10 RX ORDER — LOSARTAN POTASSIUM 25 MG/1
25 TABLET ORAL AS NEEDED
COMMUNITY
Start: 2022-10-24 | End: 2023-01-10

## 2023-01-10 NOTE — PROGRESS NOTES
Subjective    Mitzi Villafuerte is a 77 y.o. female. Fu of chf    History of Present Illness     HFrEF:  Has had return to near nomal stamina and energy. Is not sure of exactly what meds she is on now and will call later with the exact list. Has stable wt, no edema and good appetite. EKG today shows an increase in HR and decrease in voltage. Recent lab, including BNP looks good.     DIL CMO - PACER-AICD:  Severely depressed LVEF and LOGIC scores have been high in spite of clinical improvement        The following portions of the patient's history were reviewed and updated as appropriate: allergies, current medications, past family history, past medical history, past social history, past surgical history and problem list.    Patient Active Problem List   Diagnosis   • Closed displaced comminuted fracture of shaft of left femur (HCC)   • Closed displaced fracture of shaft of third metacarpal bone of left hand   • B-cell lymphoma (HCC)   • Cardiomyopathy (HCC) EF 20-25%   • Chronic low back pain with left-sided sciatica   • Chronic pain disorder   • Chronic pain due to neoplasm   • Hip pain   • Primary hypertension   • Lung mass   • Mixed hyperlipidemia   • Primary insomnia   • Vitamin D deficiency   • Dilated cardiomyopathy (HCC)   • Pleural effusion   • SOB (shortness of breath)   • Chronic systolic heart failure (HCC)   • Tachycardia   • Pedal edema   • Chronic congestive heart failure (HCC)   • High risk medication use   • AICD (automatic cardioverter/defibrillator) present       Allergies   Allergen Reactions   • Phenergan [Promethazine Hcl]    • Promethazine Unknown - High Severity       Family History   Problem Relation Age of Onset   • Heart failure Mother    • Liver disease Father    • Heart disease Sister        Social History     Socioeconomic History   • Marital status: Single   Tobacco Use   • Smoking status: Former   • Smokeless tobacco: Never   • Tobacco comments:     quit 55 years ago   Vaping Use   •  Vaping Use: Never used   Substance and Sexual Activity   • Alcohol use: No   • Drug use: No   • Sexual activity: Not Currently         Current Outpatient Medications:   •  acetaminophen (TYLENOL) 650 MG 8 hr tablet, Take 650 mg by mouth Daily., Disp: , Rfl:   •  atorvastatin (LIPITOR) 10 MG tablet, Take 1 tablet by mouth Daily., Disp: 90 tablet, Rfl: 3  •  Cholecalciferol (Vitamin D3) 50 MCG (2000 UT) capsule, Take 2 capsules by mouth Daily., Disp: 60 capsule, Rfl: 11  •  escitalopram (LEXAPRO) 10 MG tablet, TAKE 1 TABLET BY MOUTH EVERY DAY (Patient taking differently: Take 10 mg by mouth As Needed.), Disp: 90 tablet, Rfl: 3  •  furosemide (LASIX) 20 MG tablet, TAKE ONE TABLET BY MOUTH ONCE DAILY AS NEEDED FOR WEIGHT GAIN OF 2 POUNDS OR MORE FROM THE PREVIOUS MORNING, Disp: 15 tablet, Rfl: 3  •  lidocaine (LIDODERM) 5 %, Place 2 patches on the skin as directed by provider Daily. Remove & Discard patch within 12 hours or as directed by MD, Disp: 30 patch, Rfl: 0  •  megestrol (MEGACE) 40 MG tablet, Take 1 tablet by mouth Daily., Disp: 60 tablet, Rfl: 1  •  metoprolol succinate XL (TOPROL-XL) 50 MG 24 hr tablet, Take 1.5 tablets by mouth Daily., Disp: 30 tablet, Rfl: 11  •  oxyCODONE (ROXICODONE) 15 MG immediate release tablet, Take 15 mg by mouth 4 (Four) Times a Day As Needed., Disp: , Rfl:   •  potassium chloride 10 MEQ CR tablet, Take 1 tablet by mouth Daily., Disp: 7 tablet, Rfl: 0  •  spironolactone (ALDACTONE) 25 MG tablet, Take 1 tablet by mouth Daily., Disp: 30 tablet, Rfl: 11  •  sacubitril-valsartan (Entresto) 49-51 MG tablet, Take 1 tablet by mouth 2 (Two) Times a Day., Disp: 60 tablet, Rfl: 11    Past Surgical History:   Procedure Laterality Date   • CARDIAC ELECTROPHYSIOLOGY PROCEDURE N/A 3/2/2022    Procedure: ICD DC new;  Surgeon: Austin Gordillo MD;  Location: [facility] CATH INVASIVE LOCATION;  Service: Cardiology;  Laterality: N/A;   • CHOLECYSTECTOMY     • HERNIA REPAIR     • HIP TROCHANTERIC  NAILING WITH INTRAMEDULLARY HIP SCREW Left 2/2/2017    Procedure: HIP TROCANTERIC NAILING LONG WITH INTRAMEDULLARY HIP SCREW WITH CAST APPLICATION TO LEFT HAND;  Surgeon: Beni Culp MD;  Location: [facility] OR;  Service:        Review of Systems   Constitutional: Negative for activity change, appetite change, fatigue and unexpected weight change.   Respiratory: Negative for shortness of breath and wheezing.    Cardiovascular: Negative for chest pain, palpitations and leg swelling.   Gastrointestinal: Negative for abdominal pain.   Genitourinary: Negative for difficulty urinating.       /60   Pulse 106   Ht 157.5 cm (62\")   Wt 46.7 kg (103 lb)   LMP  (LMP Unknown)   BMI 18.84 kg/m²   Procedures    Objective   Physical Exam  Constitutional:       Comments: Low weight   Cardiovascular:      Rate and Rhythm: Regular rhythm. Tachycardia present.      Pulses: Normal pulses.      Heart sounds: Normal heart sounds. No murmur heard.    No friction rub. No gallop.   Pulmonary:      Effort: Pulmonary effort is normal.      Breath sounds: Normal breath sounds. No wheezing or rales.   Abdominal:      General: Bowel sounds are normal.      Tenderness: There is no abdominal tenderness.   Musculoskeletal:      Right lower leg: No edema.      Left lower leg: No edema.   Skin:     General: Skin is warm and dry.   Neurological:      General: No focal deficit present.   Psychiatric:         Mood and Affect: Mood normal.         Assessment & Plan   Diagnoses and all orders for this visit:    1. Chronic systolic heart failure (HCC) (Primary)  Comments:  appears compensated in spite of elevated HEART LOGIC scores. will call later today with current list of meds - then consider Ivabridine  Orders:  -     ECG 12 Lead  -     sacubitril-valsartan (Entresto) 49-51 MG tablet; Take 1 tablet by mouth 2 (Two) Times a Day.  Dispense: 60 tablet; Refill: 11    2. AICD (automatic cardioverter/defibrillator) present  Comments:  good  function - no shocks and very little ace pacing    3. Primary hypertension  Comments:  good control - will increase Entresto today         Mdm=mod - reviewing meds and labs and adjusting chf meds         Return in about 3 months (around 4/10/2023).  Orders Placed This Encounter   Procedures   • ECG 12 Lead     Order Specific Question:   Reason for Exam:     Answer:   CHF.HTN.HLD     Order Specific Question:   Release to patient     Answer:   Routine Release

## 2023-01-12 NOTE — TELEPHONE ENCOUNTER
Caller: Mitzi Villafuerte    Relationship: Self    Best call back number: [phone]    What is the best time to reach you: ANY     Who are you requesting to speak with (clinical staff, provider,  specific staff member): CLINICAL STAFF-[facility]RI[facility]    Do you know the name of the person who called: PT    What was the call regarding: PT WANTED TO ADVISE THAT SHE HAD SENT MED LIST TO LUIS ENRIQUE VIA US POSTAL SERVICE-SHOULD BE AT OFFICE-WANTS TO KNOW IF MED TAKING ARE CORRECT-NEEDS TO FILL OUT FINANCIAL ASSISTANCE FOR ENTRESTO     Do you require a callback: YES

## 2023-01-14 PROCEDURE — G2066 INTER DEVC REMOTE 30D: HCPCS | Performed by: INTERNAL MEDICINE

## 2023-01-14 PROCEDURE — 93297 REM INTERROG DEV EVAL ICPMS: CPT | Performed by: INTERNAL MEDICINE

## 2023-01-21 ENCOUNTER — TELEPHONE (OUTPATIENT)
Dept: CARDIOLOGY | Facility: CLINIC | Age: 78
End: 2023-01-21
Payer: COMMERCIAL

## 2023-01-21 NOTE — TELEPHONE ENCOUNTER
Patient's HeartLogic Heart Failure Index is elevated at 24.  Patient reports having COVID recently.  Reports having a bad cold, increased nasal congestion, sore throat.  Patient reports that symptoms have resolved.  She denies edema, weight gain, orthopnea, PND, chest pain, fatigue.  She does report being under increased stress right now.  RN advised patient to notify her if she develops any symptoms.  Understanding verbalized.

## 2023-01-26 ENCOUNTER — TELEPHONE (OUTPATIENT)
Dept: CARDIOLOGY | Facility: CLINIC | Age: 78
End: 2023-01-26
Payer: COMMERCIAL

## 2023-01-26 NOTE — TELEPHONE ENCOUNTER
Called pt to check on her as her Heartlogic remains elevated, but is beginning to trend down.     Ms. Villafuerte states she feels slightly improved over last week in regards to her cough and nasal congestion. Denies any chest pain, PND, lightheadedness, or swelling in abd or lower extremities. No worsening SOA. States she has been weighing herself daily and she has lost a little weight.     She will continue to be aware of her symptomatology and contact myself or the office if she feels like she is worsening, or notes any overnight weight gain over 2 pounds.

## 2023-01-27 RX ORDER — OXYCODONE HYDROCHLORIDE 15 MG/1
15 TABLET, FILM COATED, EXTENDED RELEASE ORAL EVERY 12 HOURS SCHEDULED
COMMUNITY

## 2023-02-05 ENCOUNTER — NURSE TRIAGE (OUTPATIENT)
Dept: CALL CENTER | Facility: HOSPITAL | Age: 78
End: 2023-02-05
Payer: COMMERCIAL

## 2023-02-05 NOTE — TELEPHONE ENCOUNTER
"  Reached out to on call provider, Dr. Gomez, he states she needs the entresto especially if her CHF is severe.  If she is very afraid to take then do half dose tonight, either way call office tomorrow for follow up.      Reason for Disposition  • [1] Systolic BP  AND [2] taking blood pressure medications AND [3] NOT dizzy, lightheaded or weak    Additional Information  • Negative: Started suddenly after an allergic medicine, an allergic food, or bee sting  • Negative: Shock suspected (e.g., cold/pale/clammy skin, too weak to stand, low BP, rapid pulse)  • Negative: Difficult to awaken or acting confused (e.g., disoriented, slurred speech)  • Negative: Fainted  • Negative: [1] Systolic BP < 90 AND [2] dizzy, lightheaded, or weak  • Negative: Chest pain  • Negative: Bleeding (e.g., vomiting blood, rectal bleeding or tarry stools, severe vaginal bleeding)(Exception: fainted from sight of small amount of blood; small cut or abrasion)  • Negative: Extra heart beats or heart is beating fast  (i.e., \"palpitations\")  • Negative: Sounds like a life-threatening emergency to the triager  • Negative: [1] Systolic BP < 80 AND [2] NOT dizzy, lightheaded or weak  • Negative: Abdominal pain  • Negative: Fever > 100.4 F (38.0 C)  • Negative: Major surgery in the past month  • Negative: [1] Drinking very little AND [2] dehydration suspected (e.g., no urine > 12 hours, very dry mouth, very lightheaded)  • Negative: [1] Fall in systolic BP > 20 mm Hg from normal AND [2] dizzy, lightheaded, or weak  • Negative: Patient sounds very sick or weak to the triager  • Negative: [1] Systolic BP < 90 AND [2] NOT dizzy, lightheaded or weak  • Negative: [1] Systolic BP  AND [2] taking blood pressure medications AND [3] dizzy, lightheaded or weak    Answer Assessment - Initial Assessment Questions  1. BLOOD PRESSURE: \"What is the blood pressure?\" \"Did you take at least two measurements 5 minutes apart?\"      102/48  2. ONSET: \"When did " "you take your blood pressure?\"     Today  3. HOW: \"How did you obtain the blood pressure?\" (e.g., visiting nurse, automatic home BP monitor)      home  4. HISTORY: \"Do you have a history of low blood pressure?\" \"What is your blood pressure normally?\"      HX of CHF takes Entresto and states it was increased last month.  She is not able to tell me the dose but does take it BID.  I asked if still on Toprol XL, Lasix or aldactone and unable to tell me.   5. MEDICATIONS: \"Are you taking any medications for blood pressure?\" If Yes, ask: \"Have they been changed recently?\"      See above  6. PULSE RATE: \"Do you know what your pulse rate is?\"     85  7. OTHER SYMPTOMS: \"Have you been sick recently?\" \"Have you had a recent injury?\"      Denies dizziness, chest pain, SOA  8. PREGNANCY: \"Is there any chance you are pregnant?\" \"When was your last menstrual period?\"      na    Protocols used: BLOOD PRESSURE - LOW-ADULT-AH      "

## 2023-02-14 ENCOUNTER — TELEPHONE (OUTPATIENT)
Dept: CARDIOLOGY | Facility: CLINIC | Age: 78
End: 2023-02-14
Payer: COMMERCIAL

## 2023-02-14 PROCEDURE — 93297 REM INTERROG DEV EVAL ICPMS: CPT | Performed by: INTERNAL MEDICINE

## 2023-02-14 PROCEDURE — G2066 INTER DEVC REMOTE 30D: HCPCS | Performed by: INTERNAL MEDICINE

## 2023-02-14 NOTE — TELEPHONE ENCOUNTER
Patient reports medication compliance but does state that she is only taking Entresto once daily because she was having hypotension with twice daily dosing.  Appointment made for Friday, 2/17/23, to adjust VT zone, per Dr. Gordillo's order.

## 2023-02-14 NOTE — TELEPHONE ENCOUNTER
Chela Almonte, [employer] rep, agrees that episode was SVT treated inappropriately because it met sustained rate duration (episode length was almost 10 minutes long).  Recommendation to prevent this from recurring is to turn off sustained rate duration or increase VT zone to 180/185 bpm.  RN will notify Dr. Gordillo and contact patient with his recommendations.

## 2023-02-14 NOTE — TELEPHONE ENCOUNTER
RN was notified by MA that patient left a message earlier this morning stating she had been shocked three times by her AICD.  RN contacted patient who verified that she was shocked multiple times around 07:30 AM today.  Per patient, she was very emotionally distraught at the time of being shocked.  RN walked patient through sending a remote AICD report using her latitude monitor.  RN assessed episode and it appears that patient was shocked six times inappropriately for SVT (rate 175-178 bpm) because it met sustained rate duration criteria.  Per patient, she feels much better now and her heart rate is decreasing.  She has family present with her.  RN advised patient to seek care at ED if she was shocked again.  Patient asked for something for anxiety and RN recommended patient reach out to her primary care physician for anxiety management.  RN will notify Dr. Gordillo and [facility] of episode/shocks and will contact patient with their recommendations.

## 2023-02-15 NOTE — TELEPHONE ENCOUNTER
Patient prefers to have device reprogrammed and see APRN on same day.  Appointments made for 2/20/23.  Patient advised to seek care at ED if shocked over the weekend.  Understanding verbalized.

## 2023-02-15 NOTE — TELEPHONE ENCOUNTER
Per patient, she is taking Entresto 24-26 mg once daily.  She never increased to the 49-51 mg dosage because it had to be preapproved and was denied.  Per patient, her BP is too low when she takes the Entresto 24-26 mg twice daily.  RN will notify Dr. Gordillo to see his recommendations.    Patient reports feeling much better today and she was able to rest well.  No additional shocks reported.

## 2023-02-22 ENCOUNTER — OFFICE VISIT (OUTPATIENT)
Dept: CARDIOLOGY | Facility: CLINIC | Age: 78
End: 2023-02-22
Payer: MEDICARE

## 2023-02-22 VITALS
RESPIRATION RATE: 18 BRPM | HEART RATE: 80 BPM | OXYGEN SATURATION: 98 % | BODY MASS INDEX: 18.95 KG/M2 | SYSTOLIC BLOOD PRESSURE: 118 MMHG | DIASTOLIC BLOOD PRESSURE: 65 MMHG | HEIGHT: 62 IN | WEIGHT: 103 LBS

## 2023-02-22 DIAGNOSIS — Z95.810 AICD (AUTOMATIC CARDIOVERTER/DEFIBRILLATOR) PRESENT: ICD-10-CM

## 2023-02-22 DIAGNOSIS — I47.1 PAROXYSMAL SVT (SUPRAVENTRICULAR TACHYCARDIA): Primary | ICD-10-CM

## 2023-02-22 DIAGNOSIS — E78.2 MIXED HYPERLIPIDEMIA: ICD-10-CM

## 2023-02-22 DIAGNOSIS — I10 PRIMARY HYPERTENSION: ICD-10-CM

## 2023-02-22 DIAGNOSIS — I50.22 CHRONIC SYSTOLIC CONGESTIVE HEART FAILURE: ICD-10-CM

## 2023-02-22 PROBLEM — I47.10 SUSTAINED SVT: Status: ACTIVE | Noted: 2023-01-01

## 2023-02-22 PROCEDURE — 99215 OFFICE O/P EST HI 40 MIN: CPT | Performed by: NURSE PRACTITIONER

## 2023-02-22 RX ORDER — SACUBITRIL AND VALSARTAN 24; 26 MG/1; MG/1
1 TABLET, FILM COATED ORAL 2 TIMES DAILY
COMMUNITY
End: 2023-02-22

## 2023-02-22 RX ORDER — LOSARTAN POTASSIUM 25 MG/1
25 TABLET ORAL DAILY
Qty: 30 TABLET | Refills: 11 | Status: SHIPPED | OUTPATIENT
Start: 2023-02-22

## 2023-02-22 NOTE — PROGRESS NOTES
Subjective:     Encounter Date:02/22/2023      Patient ID: Mitzi Villafuerte is a 77 y.o. female     Chief Complaint:  History of Present Illness  Patient presents today for follow up for SVT and AICD shock for SVT. Patient is followed for cardiomyopathy. Of note patient was treated for lymphoma in 2012 with chemo and her LVEF dropped from 55 to 25% after treatments. However in 2013 her LVEF had normalized to 60%. In November 2021 she was hospitalized with acute systolic congestive heart failure, pleural effusion treated with thoracentesis and tachycardia. Of note she had COVID in August 2021 and felt like this was when her symptoms had started. She had her medicines optimized. 90 day follow up echo showed persistent low LVEF. She had an AICD placed in March of 2022. She has continued to follow closely and most recently was prescribed Entresto and then increased to 49/51 dose in January. However she has been taking Entresto 24/26 daily due to hypotension and dizziness.  Of note on February 14th patient received multiple shocks for sustained SVT with rates 175-178. She notes she had a very bad day and was emotionally upset when this occurred. She does note since starting Entresto 24/26 she has had hypotension, fatigue and dizziness. She notes she has only been taking Entresto once a day and her BP still remains low. Her Optivol level was elevated but is back to 0 today. Overall she notes she is doing okay. Denies chest pain or volume overload.     The following portions of the patient's history were reviewed and updated as appropriate: allergies, current medications, past medical history, past social history, past and problem list.    Allergies   Allergen Reactions   • Phenergan [Promethazine Hcl]    • Promethazine Unknown - High Severity       Current Outpatient Medications:   •  acetaminophen (TYLENOL) 650 MG 8 hr tablet, Take 650 mg by mouth Daily., Disp: , Rfl:   •  atorvastatin (LIPITOR) 10 MG tablet, Take 1  tablet by mouth Daily., Disp: 90 tablet, Rfl: 3  •  Cholecalciferol (Vitamin D3) 50 MCG (2000 UT) capsule, Take 2 capsules by mouth Daily., Disp: 60 capsule, Rfl: 11  •  escitalopram (LEXAPRO) 10 MG tablet, TAKE 1 TABLET BY MOUTH EVERY DAY (Patient taking differently: Take 10 mg by mouth As Needed.), Disp: 90 tablet, Rfl: 3  •  furosemide (LASIX) 20 MG tablet, TAKE ONE TABLET BY MOUTH ONCE DAILY AS NEEDED FOR WEIGHT GAIN OF 2 POUNDS OR MORE FROM THE PREVIOUS MORNING, Disp: 15 tablet, Rfl: 3  •  lidocaine (LIDODERM) 5 %, Place 2 patches on the skin as directed by provider Daily. Remove & Discard patch within 12 hours or as directed by MD, Disp: 30 patch, Rfl: 0  •  megestrol (MEGACE) 40 MG tablet, Take 1 tablet by mouth Daily., Disp: 60 tablet, Rfl: 1  •  metoprolol succinate XL (TOPROL-XL) 50 MG 24 hr tablet, Take 1.5 tablets by mouth Daily., Disp: 30 tablet, Rfl: 11  •  oxyCODONE (ROXICODONE) 15 MG immediate release tablet, Take 15 mg by mouth 4 (Four) Times a Day As Needed., Disp: , Rfl:   •  oxyCODONE ER (oxyCONTIN) 15 MG tablet extended-release 12 hour, Take 15 mg by mouth Every 12 (Twelve) Hours., Disp: , Rfl:   •  potassium chloride 10 MEQ CR tablet, Take 1 tablet by mouth Daily., Disp: 7 tablet, Rfl: 0  •  spironolactone (ALDACTONE) 25 MG tablet, Take 1 tablet by mouth Daily., Disp: 30 tablet, Rfl: 11  •  losartan (Cozaar) 25 MG tablet, Take 1 tablet by mouth Daily., Disp: 30 tablet, Rfl: 11    Social History     Socioeconomic History   • Marital status: Single   Tobacco Use   • Smoking status: Former   • Smokeless tobacco: Never   • Tobacco comments:     quit 55 years ago   Vaping Use   • Vaping Use: Never used   Substance and Sexual Activity   • Alcohol use: No   • Drug use: No   • Sexual activity: Not Currently       Review of Systems   Constitutional: Positive for malaise/fatigue. Negative for chills, decreased appetite, fever, weight gain and weight loss.   HENT: Negative for nosebleeds.    Eyes:  Negative for visual disturbance.   Cardiovascular: Positive for dyspnea on exertion. Negative for chest pain, leg swelling, near-syncope, orthopnea, palpitations, paroxysmal nocturnal dyspnea and syncope.   Respiratory: Positive for shortness of breath. Negative for cough, hemoptysis and snoring.    Endocrine: Negative for cold intolerance and heat intolerance.   Hematologic/Lymphatic: Negative for bleeding problem. Does not bruise/bleed easily.   Skin: Negative for rash.   Musculoskeletal: Positive for muscle weakness. Negative for back pain and falls.   Gastrointestinal: Negative for abdominal pain, constipation, diarrhea, heartburn, melena, nausea and vomiting.   Genitourinary: Negative for hematuria.   Neurological: Negative for dizziness, headaches and light-headedness.   Psychiatric/Behavioral: Negative for altered mental status.   Allergic/Immunologic: Negative for persistent infections.              Objective:     Constitutional:       Appearance: Not in distress. Frail. Chronically ill-appearing.   Eyes:      Pupils: Pupils are equal, round, and reactive to light.   HENT:      Head: Normocephalic and atraumatic.      Nose: Nose normal.    Mouth/Throat:      Dentition: Normal.   Pulmonary:      Effort: Pulmonary effort is normal.      Breath sounds: Normal breath sounds.   Chest:      Chest wall: Not tender to palpatation.   Cardiovascular:      PMI at left midclavicular line. Normal rate. Regular rhythm.      Murmurs: There is no murmur.   Pulses:     Intact distal pulses.   Edema:     Ankle: bilateral trace edema of the ankle.  Abdominal:      General: Bowel sounds are normal.      Palpations: Abdomen is soft.   Musculoskeletal: Normal range of motion.      Cervical back: Normal range of motion. Skin:     General: Skin is warm and dry.   Neurological:      Mental Status: Alert, oriented to person, place, and time and oriented to person, place and time.   Psychiatric:         Attention and Perception:  "Attention normal.         Mood and Affect: Mood normal.           Procedures  /65   Pulse 80   Resp 18   Ht 157.5 cm (62\")   Wt 46.7 kg (103 lb)   LMP  (LMP Unknown)   SpO2 98%   BMI 18.84 kg/m²   Lab Review:   I have reviewed       Lab Results   Component Value Date    CHOL 118 11/25/2021    CHLPL 161 04/05/2022    TRIG 66 04/05/2022    HDL 43 04/05/2022     (H) 04/05/2022      Results for orders placed in visit on 02/15/22    Adult Transthoracic Echo Complete W/ Cont if Necessary Per Protocol    Interpretation Summary  · The left ventricular cavity is moderately dilated.  · Estimated right ventricular systolic pressure from tricuspid regurgitation is markedly elevated (>55 mmHg).  · Severe pulmonary hypertension is present.  · The right atrial cavity is moderately dilated.  · Left ventricular diastolic function is consistent with (grade Ia w/high LAP) impaired relaxation.  · Left ventricular ejection fraction appears to be 21 - 25%. Left ventricular systolic function is severely decreased.  · Moderate tricuspid valve regurgitation is present.  · The right ventricular cavity is mildly dilated.    SEVERE DILATED CARDIOMYOPATHY     Assessment:          Diagnosis Plan   1. Paroxysmal SVT (supraventricular tachycardia) (HCC)        2. AICD (automatic cardioverter/defibrillator) present        3. Chronic systolic congestive heart failure (HCC)        4. Primary hypertension        5. Mixed hyperlipidemia               Plan:       1. Paroxysmal SVT - one episode on 2/14 that was treated with inappropriate AICD shocks. No recurrence. She note she was very upset that day. On Toprol 75. Has been experiencing hypotension and not able to tolerate Entresto. Switch back to Losartan. Consider increasing Toprol at follow up.   2. AICD- VT zone increased today from 170-180.   3. Chronic Systolic Congestive Heart Failure- Euvolemic on exam today. Heart Logic score 0 today. Stop Entresto as not able to tolerate " 24/26 BID. Resume Losartan. Continue Toprol and Aldactone. Would consider Jardiance or Farxiga at follow up. Could consider Entresto again in future if BP will allow. Low Salt. AICD in place.   4. Hypertension- blood pressure borderline low. Having hypotension.   5. Mixed Hyperlipidemia managed by PCP on Statin.     Discussed device interrogation with Rowena RODRIGUEZ. Reviewed.        I spent 45 minutes caring for Mitzi on this date of service. This time includes time spent by me in the following activities:preparing for the visit, reviewing tests, obtaining and/or reviewing a separately obtained history, performing a medically appropriate examination and/or evaluation , counseling and educating the patient/family/caregiver, ordering medications, tests, or procedures, referring and communicating with other health care professionals , documenting information in the medical record, independently interpreting results and communicating that information with the patient/family/caregiver and care coordination

## 2023-03-16 ENCOUNTER — TELEPHONE (OUTPATIENT)
Dept: CARDIOLOGY | Facility: HOSPITAL | Age: 78
End: 2023-03-16
Payer: COMMERCIAL

## 2023-03-16 NOTE — TELEPHONE ENCOUNTER
Called and spoke with pt for symptom check regarding her elevated HeartLogic beginning 3/12/23. Alert was received via [employer] home monitor. Murj report submitted for review.    Ms Villafuerte denied any heart failure related symptoms currently. She denied any chest pain, SOA, palpitations, swelling in her extremities or abd, orthopnea, or PND. She also denied any flu-like illness symptoms.    Ms Villafuerte verified that she has been compliant with all prescribed home medications. She stated she has not needed to take her PRN Lasix in a long time, and that she weighs daily. She knows to call the office should she gain 2 pounds overnight or gain 5 pounds in one week.     I advised Ms Villafuerte to call myself or the office if she has any questions or concerns, and she voiced understanding and appreciation.

## 2023-03-17 PROCEDURE — G2066 INTER DEVC REMOTE 30D: HCPCS | Performed by: INTERNAL MEDICINE

## 2023-03-17 PROCEDURE — 93297 REM INTERROG DEV EVAL ICPMS: CPT | Performed by: INTERNAL MEDICINE

## 2023-03-31 ENCOUNTER — TELEPHONE (OUTPATIENT)
Dept: CARDIOLOGY | Facility: HOSPITAL | Age: 78
End: 2023-03-31
Payer: COMMERCIAL

## 2023-03-31 NOTE — TELEPHONE ENCOUNTER
Called and spoke with pt regarding elevated HeartLogic of 32 on 3/29/23. HL index has been elevated since 3/12/23 with value range of 22-32.     Ms Villafuerte denied any chest pain, SOA, palpitations, orthopnea, PND, or extremity swelling. She stated she normally weighs herself daily, but has not in three days. She weighed herself while on the phone with me and she had gained 3 pounds in 3 days.     I attempted to verify Ms Villafuerte's prescribed home medications, and there were some discrepancies.   She is listed as being prescribed Aldactone and she states she was instructed to not take it. However at her last in-office appt with CARMEN Alvarez it is written that she is to continue this medication. I read that to her and she stated she did not know she needed to take the Aldactone. It is unknown when she stopped taking it.   I also asked her if she was taking her Lipitor and she stated that she has never taken Lipitor.    She is still prescribed PRN Lasix, and I instructed her to take one if she weighs herself in the morning and has had an overnight weight gain of 2 pounds or more. She voiced understanding.     I advised Ms Villafuerte to call myself or the office should she have any questions or concerns and she verbalized understanding.

## 2023-04-17 PROCEDURE — G2066 INTER DEVC REMOTE 30D: HCPCS | Performed by: INTERNAL MEDICINE

## 2023-04-17 PROCEDURE — 93297 REM INTERROG DEV EVAL ICPMS: CPT | Performed by: INTERNAL MEDICINE

## 2023-04-25 ENCOUNTER — OFFICE VISIT (OUTPATIENT)
Dept: CARDIOLOGY | Facility: CLINIC | Age: 78
End: 2023-04-25
Payer: MEDICARE

## 2023-04-25 VITALS
WEIGHT: 107 LBS | SYSTOLIC BLOOD PRESSURE: 141 MMHG | DIASTOLIC BLOOD PRESSURE: 63 MMHG | HEART RATE: 103 BPM | HEIGHT: 62 IN | BODY MASS INDEX: 19.69 KG/M2

## 2023-04-25 DIAGNOSIS — I42.0 DILATED CARDIOMYOPATHY: ICD-10-CM

## 2023-04-25 DIAGNOSIS — I42.9 CARDIOMYOPATHY, UNSPECIFIED TYPE: ICD-10-CM

## 2023-04-25 DIAGNOSIS — I50.22 CHRONIC SYSTOLIC HEART FAILURE: Primary | ICD-10-CM

## 2023-04-25 DIAGNOSIS — I47.1 PAROXYSMAL SVT (SUPRAVENTRICULAR TACHYCARDIA): ICD-10-CM

## 2023-04-25 DIAGNOSIS — R00.0 TACHYCARDIA: ICD-10-CM

## 2023-04-25 DIAGNOSIS — R00.0 SINUS TACHYCARDIA: ICD-10-CM

## 2023-04-25 DIAGNOSIS — Z95.810 AICD (AUTOMATIC CARDIOVERTER/DEFIBRILLATOR) PRESENT: ICD-10-CM

## 2023-04-25 PROCEDURE — 99214 OFFICE O/P EST MOD 30 MIN: CPT | Performed by: INTERNAL MEDICINE

## 2023-04-25 PROCEDURE — 3078F DIAST BP <80 MM HG: CPT | Performed by: INTERNAL MEDICINE

## 2023-04-25 PROCEDURE — 1159F MED LIST DOCD IN RCRD: CPT | Performed by: INTERNAL MEDICINE

## 2023-04-25 PROCEDURE — 3077F SYST BP >= 140 MM HG: CPT | Performed by: INTERNAL MEDICINE

## 2023-04-25 PROCEDURE — 93000 ELECTROCARDIOGRAM COMPLETE: CPT | Performed by: INTERNAL MEDICINE

## 2023-04-25 PROCEDURE — 1160F RVW MEDS BY RX/DR IN RCRD: CPT | Performed by: INTERNAL MEDICINE

## 2023-04-25 RX ORDER — FUROSEMIDE 20 MG/1
20 TABLET ORAL DAILY PRN
Qty: 15 TABLET | Refills: 3 | Status: SHIPPED | OUTPATIENT
Start: 2023-04-25

## 2023-04-25 NOTE — PROGRESS NOTES
"Subjective    Mitzi Villafuerte is a 77 y.o. female. FU of chf and rhythm    History of Present Illness     DIL CMO - PACER/AICD:  Stable stamina since LOV. No pain at the IED site and no shocks. No edema but and wt is stable by her repor. Has been doing daily wts and has used an extra Lasix once in the past 3 mo.is wanting to go back on Entresto at this time and she has plenty at home from before. Home bp's are now \"120's/60's\" and she is no longer light-headed with standing. Interrogation of device is good but HEART LOGIC scores have been elevated. Has chronically elevated HR's (sinus tach) in spite of bb at mod dose. EKG today is nsc with [id number]    HX PSVT:  AT burden is 1% on device interrogation and no recent tachy or ace tx..      The following portions of the patient's history were reviewed and updated as appropriate: allergies, current medications, past family history, past medical history, past social history, past surgical history and problem list.    Patient Active Problem List   Diagnosis   • Closed displaced comminuted fracture of shaft of left femur   • Closed displaced fracture of shaft of third metacarpal bone of left hand   • B-cell lymphoma   • Cardiomyopathy (HCC) EF 20-25%   • Chronic low back pain with left-sided sciatica   • Chronic pain disorder   • Chronic pain due to neoplasm   • Hip pain   • Primary hypertension   • Lung mass   • Mixed hyperlipidemia   • Primary insomnia   • Vitamin D deficiency   • Dilated cardiomyopathy (HCC)   • Pleural effusion   • SOB (shortness of breath)   • Chronic systolic heart failure   • Tachycardia   • Pedal edema   • Chronic congestive heart failure   • High risk medication use   • AICD (automatic cardioverter/defibrillator) present   • Paroxysmal SVT (supraventricular tachycardia)       Allergies   Allergen Reactions   • Phenergan [Promethazine Hcl]    • Promethazine Unknown - High Severity       Family History   Problem Relation Age of Onset   • Heart " failure Mother    • Liver disease Father    • Heart disease Sister        Social History     Socioeconomic History   • Marital status: Single   Tobacco Use   • Smoking status: Former   • Smokeless tobacco: Never   • Tobacco comments:     quit 55 years ago   Vaping Use   • Vaping Use: Never used   Substance and Sexual Activity   • Alcohol use: No   • Drug use: No   • Sexual activity: Not Currently         Current Outpatient Medications:   •  acetaminophen (TYLENOL) 650 MG 8 hr tablet, Take 1 tablet by mouth Daily., Disp: , Rfl:   •  atorvastatin (LIPITOR) 10 MG tablet, Take 1 tablet by mouth Daily., Disp: 90 tablet, Rfl: 3  •  escitalopram (LEXAPRO) 10 MG tablet, TAKE 1 TABLET BY MOUTH EVERY DAY (Patient taking differently: Take 1 tablet by mouth As Needed.), Disp: 90 tablet, Rfl: 3  •  furosemide (LASIX) 20 MG tablet, Take 1 tablet by mouth Daily As Needed (for wt gain of 2 lbs from the day before)., Disp: 15 tablet, Rfl: 3  •  lidocaine (LIDODERM) 5 %, Place 2 patches on the skin as directed by provider Daily. Remove & Discard patch within 12 hours or as directed by MD, Disp: 30 patch, Rfl: 0  •  megestrol (MEGACE) 40 MG tablet, Take 1 tablet by mouth Daily., Disp: 60 tablet, Rfl: 1  •  metoprolol succinate XL (TOPROL-XL) 50 MG 24 hr tablet, Take 1.5 tablets by mouth Daily., Disp: 30 tablet, Rfl: 11  •  oxyCODONE (ROXICODONE) 15 MG immediate release tablet, Take 1 tablet by mouth 4 (Four) Times a Day As Needed., Disp: , Rfl:   •  oxyCODONE ER (oxyCONTIN) 15 MG tablet extended-release 12 hour, Take 1 tablet by mouth Every 12 (Twelve) Hours., Disp: , Rfl:   •  spironolactone (ALDACTONE) 25 MG tablet, Take 1 tablet by mouth Daily., Disp: 30 tablet, Rfl: 11  •  ivabradine HCl (CORLANOR) 5 MG tablet tablet, Take 1 tablet by mouth 2 (Two) Times a Day With Meals., Disp: 60 tablet, Rfl: 11  •  sacubitril-valsartan (ENTRESTO) 24-26 MG tablet, Take 1 tablet by mouth 2 (Two) Times a Day., Disp: 60 tablet, Rfl: 11    Past  "Surgical History:   Procedure Laterality Date   • CARDIAC ELECTROPHYSIOLOGY PROCEDURE N/A 3/2/2022    Procedure: ICD DC new;  Surgeon: Austin Gordillo MD;  Location:  [facility];  Service: Cardiology;  Laterality: N/A;   • CHOLECYSTECTOMY     • HERNIA REPAIR     • HIP TROCHANTERIC NAILING WITH INTRAMEDULLARY HIP SCREW Left 2/2/2017    Procedure: HIP TROCANTERIC NAILING LONG WITH INTRAMEDULLARY HIP SCREW WITH CAST APPLICATION TO LEFT HAND;  Surgeon: Beni Culp MD;  Location:  [facility] OR;  Service:        Review of Systems   Constitutional: Negative for activity change, appetite change and fatigue.   Respiratory: Negative for shortness of breath and wheezing.    Cardiovascular: Negative for chest pain, palpitations and leg swelling.   Gastrointestinal: Negative for abdominal pain and blood in stool.   Genitourinary: Negative for difficulty urinating and hematuria.   Musculoskeletal: Positive for arthralgias.   Neurological: Negative for syncope.       /63   Pulse 103   Ht 157.5 cm (62\")   Wt 48.5 kg (107 lb)   LMP  (LMP Unknown)   BMI 19.57 kg/m²   Procedures    Objective   Physical Exam  Constitutional:       Appearance: Normal appearance.   Cardiovascular:      Rate and Rhythm: Regular rhythm. Tachycardia present.      Pulses: Normal pulses.      Heart sounds: No murmur heard.    No friction rub. No gallop.   Pulmonary:      Effort: Pulmonary effort is normal.      Breath sounds: Normal breath sounds. No wheezing or rales.   Abdominal:      General: Bowel sounds are normal.      Tenderness: There is no abdominal tenderness.   Musculoskeletal:      Right lower leg: No edema.      Left lower leg: No edema.   Skin:     General: Skin is warm and dry.   Neurological:      General: No focal deficit present.   Psychiatric:         Mood and Affect: Mood normal.         Assessment & Plan   Diagnoses and all orders for this visit:    1. Chronic systolic heart failure " (Primary)  Comments:  change Losartan back to Entresto at low dose and add Corlanor.  Orders:  -     ECG 12 Lead  -     sacubitril-valsartan (ENTRESTO) 24-26 MG tablet; Take 1 tablet by mouth 2 (Two) Times a Day.  Dispense: 60 tablet; Refill: 11    2. Dilated cardiomyopathy  Comments:  mod to severely depressed LVEF - appears compensated    3. Tachycardia  Comments:  add Corlanor    4. AICD (automatic cardioverter/defibrillator) present  Comments:  good function    5. Cardiomyopathy, unspecified type  Comments:  stable  Orders:  -     furosemide (LASIX) 20 MG tablet; Take 1 tablet by mouth Daily As Needed (for wt gain of 2 lbs from the day before).  Dispense: 15 tablet; Refill: 3    6. Sinus tachycardia  -     ivabradine HCl (CORLANOR) 5 MG tablet tablet; Take 1 tablet by mouth 2 (Two) Times a Day With Meals.  Dispense: 60 tablet; Refill: 11    7. Paroxysmal SVT (supraventricular tachycardia)  Comments:  low burden now per device check      Mdm=mod - chronic conditions assessed and prescription drug managment           Return in about 3 months (around 7/25/2023).  Orders Placed This Encounter   Procedures   • ECG 12 Lead     Order Specific Question:   Reason for Exam:     Answer:   [email or web address]     Order Specific Question:   Release to patient     Answer:   Routine Release

## 2023-04-27 ENCOUNTER — OFFICE VISIT (OUTPATIENT)
Dept: INTERNAL MEDICINE | Facility: CLINIC | Age: 78
End: 2023-04-27
Payer: MEDICARE

## 2023-04-27 VITALS
HEART RATE: 68 BPM | WEIGHT: 107 LBS | OXYGEN SATURATION: 99 % | HEIGHT: 62 IN | DIASTOLIC BLOOD PRESSURE: 58 MMHG | SYSTOLIC BLOOD PRESSURE: 124 MMHG | TEMPERATURE: 97.6 F | BODY MASS INDEX: 19.69 KG/M2

## 2023-04-27 DIAGNOSIS — I47.1 PAROXYSMAL SVT (SUPRAVENTRICULAR TACHYCARDIA): ICD-10-CM

## 2023-04-27 DIAGNOSIS — I42.0 DILATED CARDIOMYOPATHY: ICD-10-CM

## 2023-04-27 DIAGNOSIS — E55.9 VITAMIN D DEFICIENCY: ICD-10-CM

## 2023-04-27 DIAGNOSIS — E78.2 MIXED HYPERLIPIDEMIA: ICD-10-CM

## 2023-04-27 DIAGNOSIS — Z78.0 POST-MENOPAUSAL: ICD-10-CM

## 2023-04-27 DIAGNOSIS — I10 PRIMARY HYPERTENSION: Primary | ICD-10-CM

## 2023-04-27 DIAGNOSIS — Z79.899 ENCOUNTER FOR LONG-TERM CURRENT USE OF MEDICATION: ICD-10-CM

## 2023-04-27 DIAGNOSIS — Z12.31 SCREENING MAMMOGRAM, ENCOUNTER FOR: ICD-10-CM

## 2023-04-27 DIAGNOSIS — F41.8 SITUATIONAL ANXIETY: ICD-10-CM

## 2023-04-27 RX ORDER — BUSPIRONE HYDROCHLORIDE 5 MG/1
5 TABLET ORAL 3 TIMES DAILY PRN
Qty: 90 TABLET | Refills: 0 | Status: SHIPPED | OUTPATIENT
Start: 2023-04-27

## 2023-04-27 NOTE — PROGRESS NOTES
The ABCs of the Annual Wellness Visit  Subsequent Medicare Wellness Visit    Chief Complaint   Patient presents with    Medicare Wellness-subsequent      Subjective    History of Present Illness:  Mitzi Villafuerte is a 77 y.o. female who presents for a Subsequent Medicare Wellness Visit.    The following portions of the patient's history were reviewed and   updated as appropriate: allergies, current medications, past family history, past medical history, past social history, past surgical history and problem list.    Compared to one year ago, the patient feels her physical   health is the same.    Compared to one year ago, the patient feels her mental   health is the same.    Recent Hospitalizations:  She was not admitted to the hospital during the last year.       Current Medical Providers:  Patient Care Team:  Urban Main MD as PCP - General (Internal Medicine)    Outpatient Medications Prior to Visit   Medication Sig Dispense Refill    furosemide (LASIX) 20 MG tablet Take 1 tablet by mouth Daily As Needed (for wt gain of 2 lbs from the day before). 15 tablet 3    ivabradine HCl (CORLANOR) 5 MG tablet tablet Take 1 tablet by mouth 2 (Two) Times a Day With Meals. 60 tablet 11    metoprolol succinate XL (TOPROL-XL) 50 MG 24 hr tablet Take 1.5 tablets by mouth Daily. (Patient taking differently: Take 1 tablet by mouth Daily.) 30 tablet 11    oxyCODONE (ROXICODONE) 15 MG immediate release tablet Take 1 tablet by mouth 4 (Four) Times a Day As Needed.      oxyCODONE ER (oxyCONTIN) 15 MG tablet extended-release 12 hour Take 1 tablet by mouth Every 12 (Twelve) Hours.      sacubitril-valsartan (ENTRESTO) 24-26 MG tablet Take 1 tablet by mouth 2 (Two) Times a Day. 60 tablet 11    spironolactone (ALDACTONE) 25 MG tablet Take 1 tablet by mouth Daily. 30 tablet 11    escitalopram (LEXAPRO) 10 MG tablet TAKE 1 TABLET BY MOUTH EVERY DAY 90 tablet 3    acetaminophen (TYLENOL) 650 MG 8 hr tablet Take 1 tablet by mouth  Daily.      atorvastatin (LIPITOR) 10 MG tablet Take 1 tablet by mouth Daily. (Patient not taking: Reported on 4/27/2023) 90 tablet 3    lidocaine (LIDODERM) 5 % Place 2 patches on the skin as directed by provider Daily. Remove & Discard patch within 12 hours or as directed by MD (Patient not taking: Reported on 4/27/2023) 30 patch 0    megestrol (MEGACE) 40 MG tablet Take 1 tablet by mouth Daily. (Patient not taking: Reported on 4/27/2023) 60 tablet 1     No facility-administered medications prior to visit.       Opioid medication/s are on active medication list.  and I have evaluated her active treatment plan and pain score trends (see table).  Vitals:    04/27/23 1032   PainSc: 0-No pain     I have reviewed the chart for potential of high risk medication and harmful drug interactions in the elderly.          Aspirin is not on active medication list.  Aspirin use is not indicated based on review of current medical condition/s. Risk of harm outweighs potential benefits.  .    Patient Active Problem List   Diagnosis    Closed displaced comminuted fracture of shaft of left femur    Closed displaced fracture of shaft of third metacarpal bone of left hand    B-cell lymphoma    Cardiomyopathy (HCC) EF 20-25%    Chronic low back pain with left-sided sciatica    Chronic pain disorder    Chronic pain due to neoplasm    Hip pain    Primary hypertension    Lung mass    Mixed hyperlipidemia    Primary insomnia    Vitamin D deficiency    Dilated cardiomyopathy (HCC)    Pleural effusion    SOB (shortness of breath)    Chronic systolic heart failure    Tachycardia    Pedal edema    Chronic congestive heart failure    High risk medication use    AICD (automatic cardioverter/defibrillator) present    Paroxysmal SVT (supraventricular tachycardia)     Advance Care Planning  Advance Directive is not on file.  ACP discussion was held with the patient during this visit. Patient has an advance directive (not in EMR), copy requested.      "  Objective    Vitals:    23 1032   BP: 124/58   BP Location: Left arm   Patient Position: Sitting   Cuff Size: Adult   Pulse: 68   Temp: 97.6 °F (36.4 °C)   TempSrc: Temporal   SpO2: 99%   Weight: 48.5 kg (107 lb)   Height: 157.5 cm (62\")   PainSc: 0-No pain     Estimated body mass index is 19.57 kg/m² as calculated from the following:    Height as of this encounter: 157.5 cm (62\").    Weight as of this encounter: 48.5 kg (107 lb).    BMI is within normal parameters. No other follow-up for BMI required.      Does the patient have evidence of cognitive impairment? No                HEALTH RISK ASSESSMENT    Smoking Status:  Social History     Tobacco Use   Smoking Status Former   Smokeless Tobacco Never   Tobacco Comments    quit 55 years ago     Alcohol Consumption:  Social History     Substance and Sexual Activity   Alcohol Use No     Fall Risk Screen:    KYLAH Fall Risk Assessment was completed, and patient is at LOW risk for falls.Assessment completed on:2023    Depression Screenin/27/2023    10:31 AM   PHQ-2/PHQ-9 Depression Screening   Little Interest or Pleasure in Doing Things 0-->not at all   Feeling Down, Depressed or Hopeless 0-->not at all   PHQ-9: Brief Depression Severity Measure Score 0       Health Habits and Functional and Cognitive Screenin/27/2023    10:32 AM   Functional & Cognitive Status   Do you have difficulty preparing food and eating? No   Do you have difficulty bathing yourself, getting dressed or grooming yourself? No   Do you have difficulty using the toilet? No   Do you have difficulty moving around from place to place? No   Do you have trouble with steps or getting out of a bed or a chair? No   Current Diet Well Balanced Diet   Dental Exam Up to date   Eye Exam Up to date   Exercise (times per week) 0 times per week   Current Exercises Include No Regular Exercise   Do you need help using the phone?  No   Are you deaf or do you have serious difficulty " hearing?  No   Do you need help with transportation? No   Do you need help shopping? No   Do you need help preparing meals?  No   Do you need help with housework?  No   Do you need help with laundry? No   Do you need help taking your medications? No   Do you need help managing money? No   Do you ever drive or ride in a car without wearing a seat belt? No   Have you felt unusual stress, anger or loneliness in the last month? No   Who do you live with? Alone   If you need help, do you have trouble finding someone available to you? No   Have you been bothered in the last four weeks by sexual problems? No   Do you have difficulty concentrating, remembering or making decisions? No       Age-appropriate Screening Schedule:  Refer to the list below for future screening recommendations based on patient's age, sex and/or medical conditions. Orders for these recommended tests are listed in the plan section. The patient has been provided with a written plan.    Health Maintenance   Topic Date Due    TDAP/TD VACCINES (1 - Tdap) Never done    ZOSTER VACCINE (1 of 2) Never done    LIPID PANEL  04/05/2023    COVID-19 Vaccine (1) 04/29/2030 (Originally 5/6/1946)    DXA SCAN  04/30/2023    INFLUENZA VACCINE  08/01/2023    ANNUAL WELLNESS VISIT  04/27/2024    HEPATITIS C SCREENING  Completed    Pneumococcal Vaccine 65+  Completed              Assessment & Plan   CMS Preventative Services Quick Reference  Risk Factors Identified During Encounter  Vision Screening Recommended  The above risks/problems have been discussed with the patient.  Follow up actions/plans if indicated are seen below in the Assessment/Plan Section.  Pertinent information has been shared with the patient in the After Visit Summary.    Diagnoses and all orders for this visit:    1. Primary hypertension (Primary)  -     CBC & Differential  -     Comprehensive Metabolic Panel  -     Urinalysis With Microscopic - Urine, Clean Catch    2. Mixed hyperlipidemia  -      Lipid Panel  -     TSH Rfx On Abnormal To Free T4    3. Vitamin D deficiency  -     Vitamin D,25-Hydroxy    4. Encounter for long-term current use of medication  -     CBC & Differential    5. Screening mammogram, encounter for  -     Mammo Screening Digital Tomosynthesis Bilateral With CAD; Future    6. Post-menopausal  -     DEXA Bone Density Axial; Future    7. Dilated cardiomyopathy (HCC)    8. Paroxysmal SVT (supraventricular tachycardia)    9. Situational anxiety  -     busPIRone (BUSPAR) 5 MG tablet; Take 1 tablet by mouth 3 (Three) Times a Day As Needed (anxiety).  Dispense: 90 tablet; Refill: 0              Result Review :  The following data was reviewed by: Urban Main MD on 04/27/2023:  CMP          9/29/2022    08:45 1/10/2023    09:07   CMP   Glucose 95   113     BUN 17   20     Creatinine 0.57   0.66     EGFR  90.5     Sodium 138   139     Potassium 4.3   4.9     Chloride 103   105     Calcium 8.9   8.1     Total Protein 6.0      Total Protein  6.7     Albumin 3.50   3.3     Globulin 2.5      Globulin  3.4     Total Bilirubin 0.2   <0.2     Alkaline Phosphatase 131   130     AST (SGOT) 18   18     ALT (SGPT) 15   8     Albumin/Globulin Ratio  1.0     BUN/Creatinine Ratio 29.8   30.3     Anion Gap  9.0             Electrolytes          9/29/2022    08:45 1/10/2023    09:07   Electrolytes   Sodium 138   139     Potassium 4.3   4.9     Chloride 103   105     Calcium 8.9   8.1       BMP          9/29/2022    08:45 1/10/2023    09:07   BMP   BUN 17   20     Creatinine 0.57   0.66     Sodium 138   139     Potassium 4.3   4.9     Chloride 103   105     CO2 29.0   25.0     Calcium 8.9   8.1              Follow Up:   Return in about 6 months (around 10/27/2023), or if symptoms worsen or fail to improve, for Recheck, Annual physical.     An After Visit Summary and PPPS were made available to the patient.                         TONG Main MD, FACP, [facility]      Electronically signed by Urban Lombardi  "MD Jose David, 04/27/23, 11:02 AM CDT.    Additional E&M Note during same encounter follows:  Patient has multiple medical problems which are significant and separately identifiable that require additional work above and beyond the Medicare Wellness Visit.      Chief Complaint  Medicare Wellness-subsequent    Subjective        HPI  Mitzi Villafuerte is also being seen today for inappropriate shock by ICD and anxiety.       Objective   Vitals:    04/27/23 1032   BP: 124/58   BP Location: Left arm   Patient Position: Sitting   Cuff Size: Adult   Pulse: 68   Temp: 97.6 °F (36.4 °C)   TempSrc: Temporal   SpO2: 99%   Weight: 48.5 kg (107 lb)   Height: 157.5 cm (62\")   PainSc: 0-No pain     Physical Exam  Vitals reviewed.   Constitutional:       Appearance: She is not ill-appearing.   Eyes:      General: No scleral icterus.     Conjunctiva/sclera: Conjunctivae normal.   Cardiovascular:      Rate and Rhythm: Normal rate and regular rhythm.      Heart sounds: Murmur heard.   Pulmonary:      Effort: Pulmonary effort is normal. No respiratory distress.   Skin:     General: Skin is warm and dry.   Neurological:      General: No focal deficit present.      Mental Status: She is alert and oriented to person, place, and time.   Psychiatric:         Mood and Affect: Mood normal.         Behavior: Behavior normal.         The following data was reviewed by: Urban Main MD on 04/27/2023:  CMP          9/29/2022    08:45 1/10/2023    09:07   CMP   Glucose 95   113     BUN 17   20     Creatinine 0.57   0.66     EGFR  90.5     Sodium 138   139     Potassium 4.3   4.9     Chloride 103   105     Calcium 8.9   8.1     Total Protein 6.0      Total Protein  6.7     Albumin 3.50   3.3     Globulin 2.5      Globulin  3.4     Total Bilirubin 0.2   <0.2     Alkaline Phosphatase 131   130     AST (SGOT) 18   18     ALT (SGPT) 15   8     Albumin/Globulin Ratio  1.0     BUN/Creatinine Ratio 29.8   30.3     Anion Gap  9.0               BMP  "         9/29/2022    08:45 1/10/2023    09:07   BMP   BUN 17   20     Creatinine 0.57   0.66     Sodium 138   139     Potassium 4.3   4.9     Chloride 103   105     CO2 29.0   25.0     Calcium 8.9   8.1                    Assessment and Plan   Diagnoses and all orders for this visit:    1. Primary hypertension (Primary)  -     CBC & Differential  -     Comprehensive Metabolic Panel  -     Urinalysis With Microscopic - Urine, Clean Catch    2. Mixed hyperlipidemia  -     Lipid Panel  -     TSH Rfx On Abnormal To Free T4    3. Vitamin D deficiency  -     Vitamin D,25-Hydroxy    4. Encounter for long-term current use of medication  -     CBC & Differential    5. Screening mammogram, encounter for  -     Mammo Screening Digital Tomosynthesis Bilateral With CAD; Future    6. Post-menopausal  -     DEXA Bone Density Axial; Future    7. Dilated cardiomyopathy (HCC)    8. Paroxysmal SVT (supraventricular tachycardia)    9. Situational anxiety  -     busPIRone (BUSPAR) 5 MG tablet; Take 1 tablet by mouth 3 (Three) Times a Day As Needed (anxiety).  Dispense: 90 tablet; Refill: 0    Patient had some inappropriate shocks from her ICD when she was having issues with panic.  They have adjusted the VT settings.  The cardiologist recommended for the patient to discuss with me treating anxiety as they are worried it contributed to her inappropraite shock.  She was taking PRN lexapro which physiologically will not work well, but may have a placebo effect (which is real effect) that was helping.  Do not recommend a BDZ given age and medications (chronic narcotics).  Will trial buspirone.    Reviewed cardiology notes at [facility].      BP well controlled.  Euvoelmic. No signs of CHF decompensation.      Most Recent Echo Result    Adult Transthoracic Echo Complete W/ Cont if Necessary Per Protocol 2/15/2022  3:40 PM    Interpretation Summary  · The left ventricular cavity is moderately dilated.  · Estimated right ventricular systolic  pressure from tricuspid regurgitation is markedly elevated (>55 mmHg).  · Severe pulmonary hypertension is present.  · The right atrial cavity is moderately dilated.  · Left ventricular diastolic function is consistent with (grade Ia w/high LAP) impaired relaxation.  · Left ventricular ejection fraction appears to be 21 - 25%. Left ventricular systolic function is severely decreased.  · Moderate tricuspid valve regurgitation is present.  · The right ventricular cavity is mildly dilated.    SEVERE DILATED CARDIOMYOPATHY    Signed by: Austin Gordillo MD on 2/15/2022  3:56 PM      Most Recent Echo Result    Adult Transthoracic Echo Complete W/ Cont if Necessary Per Protocol 11/24/2021 10:38 AM    Interpretation Summary  · Left ventricular systolic function is severely decreased. Left ventricular ejection fraction appears to be 31 - 35%.  · Normal right ventricular cavity size noted. Borderline low right ventricular systolic function noted.  · No significant valvular abnormalities identified on this study.  · There is a left pleural effusion.    Signed by: Aravind Zuniga MD on 11/24/2021 10:47 AM            Follow Up   Return in about 6 months (around 10/27/2023), or if symptoms worsen or fail to improve, for Recheck, Annual physical.  Patient was given instructions and counseling regarding her condition or for health maintenance advice. Please see specific information pulled into the AVS if appropriate.

## 2023-04-28 ENCOUNTER — NURSE TRIAGE (OUTPATIENT)
Dept: CALL CENTER | Facility: HOSPITAL | Age: 78
End: 2023-04-28
Payer: COMMERCIAL

## 2023-04-28 ENCOUNTER — HOSPITAL ENCOUNTER (OUTPATIENT)
Facility: HOSPITAL | Age: 78
Setting detail: OBSERVATION
Discharge: HOME | End: 2023-04-29
Attending: FAMILY MEDICINE | Admitting: INTERNAL MEDICINE
Payer: MEDICARE

## 2023-04-28 ENCOUNTER — LAB (OUTPATIENT)
Dept: LAB | Facility: HOSPITAL | Age: 78
End: 2023-04-28
Payer: MEDICARE

## 2023-04-28 DIAGNOSIS — D64.9 ANEMIA, UNSPECIFIED TYPE: Primary | ICD-10-CM

## 2023-04-28 DIAGNOSIS — R89.9 ABNORMAL LABORATORY TEST: ICD-10-CM

## 2023-04-28 DIAGNOSIS — N30.90 CYSTITIS: ICD-10-CM

## 2023-04-28 DIAGNOSIS — R89.9 ABNORMAL LABORATORY TEST: Primary | ICD-10-CM

## 2023-04-28 PROBLEM — N30.00 ACUTE CYSTITIS WITHOUT HEMATURIA: Status: ACTIVE | Noted: 2023-01-01

## 2023-04-28 LAB
25(OH)D3+25(OH)D2 SERPL-MCNC: 18.8 NG/ML (ref 30–100)
ABO GROUP BLD: NORMAL
ALBUMIN SERPL-MCNC: 3.5 G/DL (ref 3.5–5.2)
ALBUMIN/GLOB SERPL: 1.2 G/DL
ALP SERPL-CCNC: 120 U/L (ref 39–117)
ALT SERPL-CCNC: 13 U/L (ref 1–33)
ANION GAP SERPL CALCULATED.3IONS-SCNC: 8 MMOL/L (ref 5–15)
APPEARANCE UR: CLEAR
AST SERPL-CCNC: 14 U/L (ref 1–32)
AUTO MIXED CELLS #: 0.8 10*3/MM3 (ref 0.1–2.6)
AUTO MIXED CELLS %: 9.1 % (ref 0.1–24)
BACTERIA #/AREA URNS HPF: ABNORMAL /HPF
BACTERIA UR QL AUTO: ABNORMAL /HPF
BASOPHILS # BLD AUTO: 0.02 10*3/MM3 (ref 0–0.2)
BASOPHILS # BLD AUTO: ABNORMAL 10*3/UL
BASOPHILS NFR BLD AUTO: 0.2 % (ref 0–1.5)
BILIRUB SERPL-MCNC: 0.2 MG/DL (ref 0–1.2)
BILIRUB UR QL STRIP: NEGATIVE
BILIRUB UR QL STRIP: NEGATIVE
BLD GP AB SCN SERPL QL: NEGATIVE
BUN SERPL-MCNC: 11 MG/DL (ref 8–23)
BUN SERPL-MCNC: 11 MG/DL (ref 8–23)
BUN/CREAT SERPL: 16.7 (ref 7–25)
BUN/CREAT SERPL: 19.3 (ref 7–25)
CALCIUM SERPL-MCNC: 8.9 MG/DL (ref 8.6–10.5)
CALCIUM SPEC-SCNC: 8 MG/DL (ref 8.6–10.5)
CASTS URNS MICRO: ABNORMAL
CHLORIDE SERPL-SCNC: 104 MMOL/L (ref 98–107)
CHLORIDE SERPL-SCNC: 106 MMOL/L (ref 98–107)
CHOLEST SERPL-MCNC: 119 MG/DL (ref 0–200)
CLARITY UR: CLEAR
CO2 SERPL-SCNC: 24 MMOL/L (ref 22–29)
CO2 SERPL-SCNC: 25.5 MMOL/L (ref 22–29)
COLOR UR: YELLOW
COLOR UR: YELLOW
CREAT SERPL-MCNC: 0.57 MG/DL (ref 0.57–1)
CREAT SERPL-MCNC: 0.66 MG/DL (ref 0.57–1)
DEPRECATED RDW RBC AUTO: 47.5 FL (ref 37–54)
DIFFERENTIAL COMMENT: ABNORMAL
EGFRCR SERPLBLD CKD-EPI 2021: 90.5 ML/MIN/1.73
EGFRCR SERPLBLD CKD-EPI 2021: 93.7 ML/MIN/1.73
EOSINOPHIL # BLD AUTO: 0.05 10*3/MM3 (ref 0–0.4)
EOSINOPHIL # BLD AUTO: ABNORMAL 10*3/UL
EOSINOPHIL # BLD MANUAL: 0.09 10*3/MM3 (ref 0–0.4)
EOSINOPHIL NFR BLD AUTO: 0.6 % (ref 0.3–6.2)
EOSINOPHIL NFR BLD AUTO: ABNORMAL %
EOSINOPHIL NFR BLD MANUAL: 1 % (ref 0.3–6.2)
EPI CELLS #/AREA URNS HPF: ABNORMAL /HPF
ERYTHROCYTE [DISTWIDTH] IN BLOOD BY AUTOMATED COUNT: 17.3 % (ref 12.3–15.4)
ERYTHROCYTE [DISTWIDTH] IN BLOOD BY AUTOMATED COUNT: 17.4 % (ref 12.3–15.4)
ERYTHROCYTE [DISTWIDTH] IN BLOOD BY AUTOMATED COUNT: 18.7 % (ref 12.3–15.4)
FERRITIN SERPL-MCNC: 17.54 NG/ML (ref 13–150)
GLOBULIN SER CALC-MCNC: 3 GM/DL
GLUCOSE SERPL-MCNC: 93 MG/DL (ref 65–99)
GLUCOSE SERPL-MCNC: 94 MG/DL (ref 65–99)
GLUCOSE UR QL STRIP: NEGATIVE
GLUCOSE UR STRIP-MCNC: NEGATIVE MG/DL
HCT VFR BLD AUTO: 17.1 % (ref 34–46.6)
HCT VFR BLD AUTO: 17.7 % (ref 34–46.6)
HCT VFR BLD AUTO: <21 % (ref 34–46.6)
HDLC SERPL-MCNC: 43 MG/DL (ref 40–60)
HGB BLD-MCNC: 4.2 G/DL (ref 12–15.9)
HGB BLD-MCNC: 4.5 G/DL (ref 12–15.9)
HGB BLD-MCNC: <6 G/DL (ref 12–15.9)
HGB UR QL STRIP.AUTO: NEGATIVE
HGB UR QL STRIP: NEGATIVE
HYALINE CASTS UR QL AUTO: ABNORMAL /LPF
IMM GRANULOCYTES # BLD AUTO: 0.04 10*3/MM3 (ref 0–0.05)
IMM GRANULOCYTES NFR BLD AUTO: 0.5 % (ref 0–0.5)
IRON 24H UR-MRATE: 11 MCG/DL (ref 37–145)
IRON SATN MFR SERPL: 2 % (ref 20–50)
KETONES UR QL STRIP: NEGATIVE
KETONES UR QL STRIP: NEGATIVE
LDH SERPL-CCNC: 204 U/L (ref 135–214)
LDLC SERPL CALC-MCNC: 64 MG/DL (ref 0–100)
LEUKOCYTE ESTERASE UR QL STRIP.AUTO: ABNORMAL
LEUKOCYTE ESTERASE UR QL STRIP: ABNORMAL
LYMPHOCYTES # BLD AUTO: 0.79 10*3/MM3 (ref 0.7–3.1)
LYMPHOCYTES # BLD AUTO: 1.5 10*3/MM3 (ref 0.7–3.1)
LYMPHOCYTES # BLD AUTO: ABNORMAL 10*3/UL
LYMPHOCYTES # BLD MANUAL: 0.84 10*3/MM3 (ref 0.7–3.1)
LYMPHOCYTES NFR BLD AUTO: 17.7 % (ref 19.6–45.3)
LYMPHOCYTES NFR BLD AUTO: 8.9 % (ref 19.6–45.3)
LYMPHOCYTES NFR BLD AUTO: ABNORMAL %
LYMPHOCYTES NFR BLD MANUAL: 9.1 % (ref 19.6–45.3)
MCH RBC QN AUTO: 17.2 PG (ref 26.6–33)
MCH RBC QN AUTO: 17.5 PG (ref 26.6–33)
MCH RBC QN AUTO: 17.5 PG (ref 26.6–33)
MCHC RBC AUTO-ENTMCNC: 24.6 G/DL (ref 31.5–35.7)
MCHC RBC AUTO-ENTMCNC: 25.4 G/DL (ref 31.5–35.7)
MCHC RBC AUTO-ENTMCNC: 25.4 G/DL (ref 31.5–35.7)
MCV RBC AUTO: 67.6 FL (ref 79–97)
MCV RBC AUTO: 68.9 FL (ref 79–97)
MCV RBC AUTO: 71.3 FL (ref 79–97)
MONOCYTES # BLD AUTO: 0.53 10*3/MM3 (ref 0.1–0.9)
MONOCYTES # BLD MANUAL: 0.37 10*3/MM3 (ref 0.1–0.9)
MONOCYTES NFR BLD AUTO: 6 % (ref 5–12)
MONOCYTES NFR BLD AUTO: ABNORMAL %
MONOCYTES NFR BLD MANUAL: 4 % (ref 5–12)
NEUTROPHILS # BLD MANUAL: 7.94 10*3/MM3 (ref 1.7–7)
NEUTROPHILS NFR BLD AUTO: 6 10*3/MM3 (ref 1.7–7)
NEUTROPHILS NFR BLD AUTO: 7.4 10*3/MM3 (ref 1.7–7)
NEUTROPHILS NFR BLD AUTO: 73.2 % (ref 42.7–76)
NEUTROPHILS NFR BLD AUTO: 83.8 % (ref 42.7–76)
NEUTROPHILS NFR BLD AUTO: ABNORMAL %
NEUTROPHILS NFR BLD MANUAL: 85.9 % (ref 42.7–76)
NITRITE UR QL STRIP: POSITIVE
NITRITE UR QL STRIP: POSITIVE
NRBC BLD AUTO-RTO: 0 /100 WBC (ref 0–0.2)
PH UR STRIP.AUTO: 6 [PH] (ref 5–8)
PH UR STRIP: 5.5 [PH] (ref 5–8)
PLATELET # BLD AUTO: 476 10*3/MM3 (ref 140–450)
PLATELET # BLD AUTO: 530 10*3/MM3 (ref 140–450)
PLATELET # BLD AUTO: 572 10*3/MM3 (ref 140–450)
PLATELET BLD QL SMEAR: ABNORMAL
PMV BLD AUTO: 7.5 FL (ref 6–12)
PMV BLD AUTO: 9.2 FL (ref 6–12)
POTASSIUM SERPL-SCNC: 4 MMOL/L (ref 3.5–5.2)
POTASSIUM SERPL-SCNC: 4.3 MMOL/L (ref 3.5–5.2)
PROT SERPL-MCNC: 6.5 G/DL (ref 6–8.5)
PROT UR QL STRIP: ABNORMAL
PROT UR QL STRIP: NEGATIVE
RBC # BLD AUTO: 2.4 10*6/MM3 (ref 3.77–5.28)
RBC # BLD AUTO: 2.57 10*6/MM3 (ref 3.77–5.28)
RBC # BLD AUTO: 2.62 10*6/MM3 (ref 3.77–5.28)
RBC # UR STRIP: ABNORMAL /HPF
RBC #/AREA URNS HPF: ABNORMAL /HPF
RBC MORPH BLD: ABNORMAL
REF LAB TEST METHOD: ABNORMAL
RETICS # AUTO: 0.08 10*6/MM3 (ref 0.02–0.13)
RETICS/RBC NFR AUTO: 3.42 % (ref 0.7–1.9)
RH BLD: POSITIVE
SODIUM SERPL-SCNC: 137 MMOL/L (ref 136–145)
SODIUM SERPL-SCNC: 138 MMOL/L (ref 136–145)
SP GR UR STRIP: 1.02 (ref 1–1.03)
SP GR UR STRIP: 1.02 (ref 1–1.03)
SQUAMOUS #/AREA URNS HPF: ABNORMAL /HPF
T&S EXPIRATION DATE: NORMAL
TIBC SERPL-MCNC: 501 MCG/DL (ref 298–536)
TRANSFERRIN SERPL-MCNC: 336 MG/DL (ref 200–360)
TRIGL SERPL-MCNC: 55 MG/DL (ref 0–150)
TSH SERPL DL<=0.005 MIU/L-ACNC: 1.68 UIU/ML (ref 0.27–4.2)
UROBILINOGEN UR QL STRIP: ABNORMAL
UROBILINOGEN UR STRIP-MCNC: ABNORMAL MG/DL
VLDLC SERPL CALC-MCNC: 12 MG/DL (ref 5–40)
WBC # BLD AUTO: 9.24 10*3/MM3 (ref 3.4–10.8)
WBC # UR STRIP: ABNORMAL /HPF
WBC #/AREA URNS HPF: ABNORMAL /HPF
WBC NRBC COR # BLD: 8.3 10*3/MM3 (ref 3.4–10.8)
WBC NRBC COR # BLD: 8.83 10*3/MM3 (ref 3.4–10.8)

## 2023-04-28 PROCEDURE — 87088 URINE BACTERIA CULTURE: CPT

## 2023-04-28 PROCEDURE — 36430 TRANSFUSION BLD/BLD COMPNT: CPT

## 2023-04-28 PROCEDURE — 87186 SC STD MICRODIL/AGAR DIL: CPT

## 2023-04-28 PROCEDURE — 83010 ASSAY OF HAPTOGLOBIN QUANT: CPT | Performed by: NURSE PRACTITIONER

## 2023-04-28 PROCEDURE — G0378 HOSPITAL OBSERVATION PER HR: HCPCS

## 2023-04-28 PROCEDURE — P9040 RBC LEUKOREDUCED IRRADIATED: HCPCS

## 2023-04-28 PROCEDURE — 36415 COLL VENOUS BLD VENIPUNCTURE: CPT

## 2023-04-28 PROCEDURE — 86901 BLOOD TYPING SEROLOGIC RH(D): CPT

## 2023-04-28 PROCEDURE — 86923 COMPATIBILITY TEST ELECTRIC: CPT

## 2023-04-28 PROCEDURE — 81001 URINALYSIS AUTO W/SCOPE: CPT

## 2023-04-28 PROCEDURE — 85014 HEMATOCRIT: CPT | Performed by: NURSE PRACTITIONER

## 2023-04-28 PROCEDURE — 83540 ASSAY OF IRON: CPT | Performed by: NURSE PRACTITIONER

## 2023-04-28 PROCEDURE — 86900 BLOOD TYPING SEROLOGIC ABO: CPT

## 2023-04-28 PROCEDURE — 25010000002 NA FERRIC GLUC CPLX PER 12.5 MG: Performed by: NURSE PRACTITIONER

## 2023-04-28 PROCEDURE — P9016 RBC LEUKOCYTES REDUCED: HCPCS

## 2023-04-28 PROCEDURE — 25010000002 FUROSEMIDE PER 20 MG: Performed by: NURSE PRACTITIONER

## 2023-04-28 PROCEDURE — 25010000002 CEFTRIAXONE PER 250 MG: Performed by: NURSE PRACTITIONER

## 2023-04-28 PROCEDURE — 25010000002 HYDROMORPHONE PER 4 MG: Performed by: FAMILY MEDICINE

## 2023-04-28 PROCEDURE — 85025 COMPLETE CBC W/AUTO DIFF WBC: CPT

## 2023-04-28 PROCEDURE — 85018 HEMOGLOBIN: CPT | Performed by: NURSE PRACTITIONER

## 2023-04-28 PROCEDURE — 86900 BLOOD TYPING SEROLOGIC ABO: CPT | Performed by: FAMILY MEDICINE

## 2023-04-28 PROCEDURE — 96374 THER/PROPH/DIAG INJ IV PUSH: CPT

## 2023-04-28 PROCEDURE — 86850 RBC ANTIBODY SCREEN: CPT | Performed by: FAMILY MEDICINE

## 2023-04-28 PROCEDURE — 85025 COMPLETE CBC W/AUTO DIFF WBC: CPT | Performed by: FAMILY MEDICINE

## 2023-04-28 PROCEDURE — 99284 EMERGENCY DEPT VISIT MOD MDM: CPT

## 2023-04-28 PROCEDURE — 85045 AUTOMATED RETICULOCYTE COUNT: CPT | Performed by: NURSE PRACTITIONER

## 2023-04-28 PROCEDURE — 87086 URINE CULTURE/COLONY COUNT: CPT

## 2023-04-28 PROCEDURE — 80048 BASIC METABOLIC PNL TOTAL CA: CPT | Performed by: FAMILY MEDICINE

## 2023-04-28 PROCEDURE — 86901 BLOOD TYPING SEROLOGIC RH(D): CPT | Performed by: FAMILY MEDICINE

## 2023-04-28 PROCEDURE — 96375 TX/PRO/DX INJ NEW DRUG ADDON: CPT

## 2023-04-28 PROCEDURE — 83615 LACTATE (LD) (LDH) ENZYME: CPT | Performed by: NURSE PRACTITIONER

## 2023-04-28 PROCEDURE — 84466 ASSAY OF TRANSFERRIN: CPT | Performed by: NURSE PRACTITIONER

## 2023-04-28 PROCEDURE — 82728 ASSAY OF FERRITIN: CPT | Performed by: NURSE PRACTITIONER

## 2023-04-28 RX ORDER — OXYCODONE HYDROCHLORIDE 5 MG/1
15 TABLET ORAL 4 TIMES DAILY PRN
Status: DISCONTINUED | OUTPATIENT
Start: 2023-04-28 | End: 2023-04-29 | Stop reason: HOSPADM

## 2023-04-28 RX ORDER — AMOXICILLIN 250 MG
1 CAPSULE ORAL NIGHTLY PRN
Status: DISCONTINUED | OUTPATIENT
Start: 2023-04-28 | End: 2023-04-29 | Stop reason: HOSPADM

## 2023-04-28 RX ORDER — SODIUM CHLORIDE 0.9 % (FLUSH) 0.9 %
10 SYRINGE (ML) INJECTION AS NEEDED
Status: DISCONTINUED | OUTPATIENT
Start: 2023-04-28 | End: 2023-04-29 | Stop reason: HOSPADM

## 2023-04-28 RX ORDER — FUROSEMIDE 10 MG/ML
20 INJECTION INTRAMUSCULAR; INTRAVENOUS ONCE
Status: COMPLETED | OUTPATIENT
Start: 2023-04-28 | End: 2023-04-28

## 2023-04-28 RX ORDER — SODIUM CHLORIDE 9 MG/ML
40 INJECTION, SOLUTION INTRAVENOUS AS NEEDED
Status: DISCONTINUED | OUTPATIENT
Start: 2023-04-28 | End: 2023-04-29 | Stop reason: HOSPADM

## 2023-04-28 RX ORDER — METOPROLOL SUCCINATE 50 MG/1
50 TABLET, EXTENDED RELEASE ORAL DAILY
Status: DISCONTINUED | OUTPATIENT
Start: 2023-04-28 | End: 2023-04-29 | Stop reason: HOSPADM

## 2023-04-28 RX ORDER — SODIUM CHLORIDE 0.9 % (FLUSH) 0.9 %
10 SYRINGE (ML) INJECTION EVERY 12 HOURS SCHEDULED
Status: DISCONTINUED | OUTPATIENT
Start: 2023-04-28 | End: 2023-04-29 | Stop reason: HOSPADM

## 2023-04-28 RX ORDER — ACETAMINOPHEN 160 MG/5ML
650 SOLUTION ORAL EVERY 4 HOURS PRN
Status: DISCONTINUED | OUTPATIENT
Start: 2023-04-28 | End: 2023-04-29 | Stop reason: HOSPADM

## 2023-04-28 RX ORDER — ONDANSETRON 2 MG/ML
4 INJECTION INTRAMUSCULAR; INTRAVENOUS EVERY 6 HOURS PRN
Status: DISCONTINUED | OUTPATIENT
Start: 2023-04-28 | End: 2023-04-29 | Stop reason: HOSPADM

## 2023-04-28 RX ORDER — CEFDINIR 300 MG/1
300 CAPSULE ORAL 2 TIMES DAILY
Qty: 10 CAPSULE | Refills: 0 | Status: SHIPPED | OUTPATIENT
Start: 2023-04-28 | End: 2023-05-03

## 2023-04-28 RX ORDER — ACETAMINOPHEN 650 MG/1
650 SUPPOSITORY RECTAL EVERY 4 HOURS PRN
Status: DISCONTINUED | OUTPATIENT
Start: 2023-04-28 | End: 2023-04-29 | Stop reason: HOSPADM

## 2023-04-28 RX ORDER — ACETAMINOPHEN 325 MG/1
650 TABLET ORAL EVERY 4 HOURS PRN
Status: DISCONTINUED | OUTPATIENT
Start: 2023-04-28 | End: 2023-04-29 | Stop reason: HOSPADM

## 2023-04-28 RX ORDER — SPIRONOLACTONE 25 MG/1
25 TABLET ORAL DAILY
Status: DISCONTINUED | OUTPATIENT
Start: 2023-04-28 | End: 2023-04-29 | Stop reason: HOSPADM

## 2023-04-28 RX ORDER — OXYCODONE HYDROCHLORIDE 15 MG/1
15 TABLET, FILM COATED, EXTENDED RELEASE ORAL EVERY 12 HOURS SCHEDULED
Status: DISCONTINUED | OUTPATIENT
Start: 2023-04-28 | End: 2023-04-29 | Stop reason: HOSPADM

## 2023-04-28 RX ORDER — ONDANSETRON 4 MG/1
4 TABLET, FILM COATED ORAL EVERY 6 HOURS PRN
Status: DISCONTINUED | OUTPATIENT
Start: 2023-04-28 | End: 2023-04-29 | Stop reason: HOSPADM

## 2023-04-28 RX ORDER — HYDROMORPHONE HYDROCHLORIDE 1 MG/ML
0.5 INJECTION, SOLUTION INTRAMUSCULAR; INTRAVENOUS; SUBCUTANEOUS ONCE
Status: COMPLETED | OUTPATIENT
Start: 2023-04-28 | End: 2023-04-28

## 2023-04-28 RX ORDER — BUSPIRONE HYDROCHLORIDE 5 MG/1
5 TABLET ORAL 3 TIMES DAILY PRN
Status: DISCONTINUED | OUTPATIENT
Start: 2023-04-28 | End: 2023-04-29 | Stop reason: HOSPADM

## 2023-04-28 RX ADMIN — OXYCODONE HYDROCHLORIDE 15 MG: 15 TABLET, FILM COATED, EXTENDED RELEASE ORAL at 20:02

## 2023-04-28 RX ADMIN — FUROSEMIDE 20 MG: 10 INJECTION, SOLUTION INTRAMUSCULAR; INTRAVENOUS at 17:17

## 2023-04-28 RX ADMIN — CEFTRIAXONE 1 G: 1 INJECTION, POWDER, FOR SOLUTION INTRAMUSCULAR; INTRAVENOUS at 17:17

## 2023-04-28 RX ADMIN — OXYCODONE HYDROCHLORIDE 15 MG: 5 TABLET ORAL at 21:50

## 2023-04-28 RX ADMIN — HYDROMORPHONE HYDROCHLORIDE 0.5 MG: 1 INJECTION, SOLUTION INTRAMUSCULAR; INTRAVENOUS; SUBCUTANEOUS at 17:16

## 2023-04-28 RX ADMIN — SODIUM CHLORIDE 250 MG: 9 INJECTION, SOLUTION INTRAVENOUS at 18:02

## 2023-04-28 RX ADMIN — Medication 10 ML: at 20:02

## 2023-04-28 NOTE — H&P
[facility] Medicine Services  HISTORY AND PHYSICAL    Date of Admission: 4/28/2023  Primary Care Physician: Urban Main MD    Subjective   Primary Historian: Patient    Chief Complaint: Shortness of breathing and weakness    History of Present Illness  Mitzi Villafuerte is a 77-year-old female with a past medical history of lymphoma 2011 now in remission, fracture of the left hip now with chronic pain syndrome, hypertension, chronic combined heart failure with recent EF 21-25%, presence of AICD-followed by Dr. Gordillo, cardiology.  Patient presents to [facility] emergency department today upon recommendation by her primary care provider.  She was seen yesterday, laboratory studies were obtained, hemoglobin documented as 4.5.  Repeat this morning reports less than 6 therefore she was advised to seek evaluation.  Studies obtained since arrival hemoglobin 4.2.  Patient denies dark/sticky stools nor hemoptysis or hematemesis.  She denies chest pain.  She states she has been somewhat short of breath and fatigued however felt this was secondary to her heart disease.  She is noted to have urinary tract infection.  Was started on Omnicef by her primary care provider.  MCV is 71.3, we will obtain anemia studies.  White count is within normal limits, she does have left shift without bandemia.  Thrombocytosis noted on labs.  She is admitted for further evaluation and treatment.    Review of Systems   Otherwise complete ROS reviewed and negative except as mentioned in the HPI.    Past Medical History:   Past Medical History:   Diagnosis Date   • Broken femur    • Chronic combined systolic (congestive) and diastolic (congestive) heart failure    • Fracture of hip     left, due to lymphoma, treated non-operatively   • Hypertension    • Lymphoma    • Presence of automatic cardioverter/defibrillator (AICD)      Past Surgical History:  Past Surgical History:   Procedure Laterality  Date   • CARDIAC ELECTROPHYSIOLOGY PROCEDURE N/A 3/2/2022    Procedure: ICD DC new;  Surgeon: Austin Gordillo MD;  Location:  [facility] CATH INVASIVE LOCATION;  Service: Cardiology;  Laterality: N/A;   • CHOLECYSTECTOMY     • HERNIA REPAIR     • HIP TROCHANTERIC NAILING WITH INTRAMEDULLARY HIP SCREW Left 2/2/2017    Procedure: HIP TROCANTERIC NAILING LONG WITH INTRAMEDULLARY HIP SCREW WITH CAST APPLICATION TO LEFT HAND;  Surgeon: Beni Culp MD;  Location:  [facility] OR;  Service:      Social History:  reports that she has quit smoking. She has never used smokeless tobacco. She reports that she does not drink alcohol and does not use drugs.    Family History: family history includes Heart disease in her sister; Heart failure in her mother; Liver disease in her father.       Allergies:  Allergies   Allergen Reactions   • Phenergan [Promethazine Hcl]    • Promethazine Unknown - High Severity       Medications:  Prior to Admission medications    Medication Sig Start Date End Date Taking? Authorizing Provider   acetaminophen (TYLENOL) 650 MG 8 hr tablet Take 1 tablet by mouth Daily.    Provider, MD Jamin   busPIRone (BUSPAR) 5 MG tablet Take 1 tablet by mouth 3 (Three) Times a Day As Needed (anxiety). 4/27/23   Urban Main MD   cefdinir (OMNICEF) 300 MG capsule Take 1 capsule by mouth 2 (Two) Times a Day for 5 days. 4/28/23 5/3/23  Christine Atkinson APRN   furosemide (LASIX) 20 MG tablet Take 1 tablet by mouth Daily As Needed (for wt gain of 2 lbs from the day before). 4/25/23   Austin Gordillo MD   ivabradine HCl (CORLANOR) 5 MG tablet tablet Take 1 tablet by mouth 2 (Two) Times a Day With Meals. 4/25/23   Austin Gordillo MD   metoprolol succinate XL (TOPROL-XL) 50 MG 24 hr tablet Take 1.5 tablets by mouth Daily.  Patient taking differently: Take 1 tablet by mouth Daily. 5/4/22   Austin Gordillo MD   oxyCODONE (ROXICODONE) 15 MG immediate release tablet Take 1 tablet  "by mouth 4 (Four) Times a Day As Needed.    Provider, MD Jamin   oxyCODONE ER (oxyCONTIN) 15 MG tablet extended-release 12 hour Take 1 tablet by mouth Every 12 (Twelve) Hours.    Provider, MD Jamin   sacubitril-valsartan (ENTRESTO) 24-26 MG tablet Take 1 tablet by mouth 2 (Two) Times a Day. 4/25/23   Austin Gordillo MD   spironolactone (ALDACTONE) 25 MG tablet Take 1 tablet by mouth Daily. 2/23/22   Romeo Shaffer APRN     I have utilized all available immediate resources to obtain, update, or review the patient's current medications (including all prescriptions, over-the-counter products, herbals, cannabis/cannabidiol products, and vitamin/mineral/dietary (nutritional) supplements).    Objective     Vital Signs: /67 (BP Location: Right arm, Patient Position: Lying)   Pulse 102   Temp 98.3 °F (36.8 °C) (Oral)   Resp 18   Ht 157.5 cm (62\")   Wt 48.1 kg (106 lb 0.7 oz)   LMP  (LMP Unknown)   SpO2 100%   BMI 19.40 kg/m²   Physical Exam  Vitals reviewed.   Constitutional:       Appearance: Normal appearance.   HENT:      Head: Normocephalic and atraumatic.      Mouth/Throat:      Mouth: Mucous membranes are moist.      Pharynx: Oropharynx is clear.   Eyes:      Extraocular Movements: Extraocular movements intact.      Conjunctiva/sclera: Conjunctivae normal.   Cardiovascular:      Rate and Rhythm: Normal rate and regular rhythm.   Pulmonary:      Effort: Pulmonary effort is normal.      Breath sounds: Normal breath sounds.   Abdominal:      General: There is no distension.      Palpations: Abdomen is soft.   Musculoskeletal:      Cervical back: Normal range of motion and neck supple.      Right lower leg: No edema.      Left lower leg: No edema.      Comments: Generalized weakness   Skin:     General: Skin is warm and dry.      Coloration: Skin is not pale.   Neurological:      General: No focal deficit present.      Mental Status: She is alert and oriented to person, place, and " time.   Psychiatric:         Mood and Affect: Mood normal.         Behavior: Behavior normal.        Results Reviewed:  Lab Results (last 24 hours)     Procedure Component Value Units Date/Time    Basic Metabolic Panel [[id number]]  (Abnormal) Collected: 04/28/23 1553    Specimen: Blood Updated: 04/28/23 1623     Glucose 93 mg/dL      BUN 11 mg/dL      Creatinine 0.66 mg/dL      Sodium 138 mmol/L      Potassium 4.0 mmol/L      Chloride 106 mmol/L      CO2 24.0 mmol/L      Calcium 8.0 mg/dL      BUN/Creatinine Ratio 16.7     Anion Gap 8.0 mmol/L      eGFR 90.5 mL/min/1.73     CBC Auto Differential [[id number]]  (Abnormal) Collected: 04/28/23 1553    Specimen: Blood Updated: 04/28/23 1613     WBC 8.83 10*3/mm3      RBC 2.40 10*6/mm3      Hemoglobin 4.2 g/dL      Hematocrit 17.1 %      MCV 71.3 fL      MCH 17.5 pg      MCHC 24.6 g/dL      RDW 18.7 %      RDW-SD 47.5 fl      MPV 9.2 fL      Platelets 476 10*3/mm3      Neutrophil % 83.8 %      Lymphocyte % 8.9 %      Monocyte % 6.0 %      Eosinophil % 0.6 %      Basophil % 0.2 %      Immature Grans % 0.5 %      Neutrophils, Absolute 7.40 10*3/mm3      Lymphocytes, Absolute 0.79 10*3/mm3      Monocytes, Absolute 0.53 10*3/mm3      Eosinophils, Absolute 0.05 10*3/mm3      Basophils, Absolute 0.02 10*3/mm3      Immature Grans, Absolute 0.04 10*3/mm3      nRBC 0.0 /100 WBC         Imaging Results (Last 24 Hours)     ** No results found for the last 24 hours. **          Assessment / Plan   Assessment:   Active Hospital Problems    Diagnosis    • **Anemia, unspecified type    • Acute cystitis without hematuria    • AICD (automatic cardioverter/defibrillator) present    • SOB (shortness of breath)    • Cardiomyopathy (HCC) EF 20-25%    • Chronic pain disorder    • Dilated cardiomyopathy (HCC)        Treatment Plan  1.  The patient will be admitted to Torres's service here at [facility].   2.  Transfuse 2 units packed red blood cells, monitor H&H every 8 hours  3.   20 mg Lasix IV after first unit  4.  Home medications reviewed and restarted as appropriate  5.  DVT prophylaxis with SCDs  6.  Labs in a.m., obtain anemia studies today, stool for occult blood  7.  Rocephin 1 g IV daily  8.  Supplemental oxygen as needed, continuous pulse oximetry  9.  Daily weights, cardiac monitoring  10.  Ferrlecit 250mg daily x3 days    Medical Decision Making  Number and Complexity of problems: 7  Differential Diagnosis: None    Conditions and Status        Condition is unchanged.     [facility] Data  External documents reviewed: None  Cardiac tracing (EKG, telemetry) interpretation: Reviewed  Radiology interpretation: None  Labs reviewed: Yes  Any tests that were considered but not ordered: No     Decision rules/scores evaluated (example [id number], Wells, etc): No     Discussed with: Patientwoodrow and Dr. Torres     Care Planning  Shared decision making: Patient, woodrow Cardenas and Dr. Torres  Code status and discussions: Full    Disposition  Social Determinants of Health that impact treatment or disposition: None  Estimated length of stay is 2-3 days.     I confirmed that the patient's advanced care plan is present, code status is documented, and a surrogate decision maker is listed in the patient's medical record.     The patient's surrogate decision maker is woodrow Cardenas.     The patient was seen and examined by me on 4/28/2023 at 4:23 PM.    Electronically signed by CARMEN Ramirez, 04/28/23, 17:08 CDT.

## 2023-04-28 NOTE — ED PROVIDER NOTES
Subjective   History of Present Illness  77-year-old female was having her Medicare wellness exam.  Patient had some blood work drawn.  Patient was told she had low hemoglobin and she needs to come the emergency room for transfusion.  Patient has no bloody stools.  Patient has no black tarry stools.  Patient has no maroon-colored stools.  Patient has no abdominal pain.  Patient has no chest pain or shortness of breath.  Patient has no cough.  Patient has no hemoptysis.  Patient has no vaginal bleeding.  Patient states that she did have a low blood pressure as well.  Patient denies any other symptoms at this time.        Review of Systems   All other systems reviewed and are negative.      Past Medical History:   Diagnosis Date   • Broken femur    • Chronic combined systolic (congestive) and diastolic (congestive) heart failure    • Fracture of hip     left, due to lymphoma, treated non-operatively   • Hypertension    • Lymphoma    • Presence of automatic cardioverter/defibrillator (AICD)        Allergies   Allergen Reactions   • Phenergan [Promethazine Hcl]    • Promethazine Unknown - High Severity       Past Surgical History:   Procedure Laterality Date   • CARDIAC ELECTROPHYSIOLOGY PROCEDURE N/A 3/2/2022    Procedure: ICD DC new;  Surgeon: Austin Gordillo MD;  Location:  [facility] CATH INVASIVE LOCATION;  Service: Cardiology;  Laterality: N/A;   • CHOLECYSTECTOMY     • HERNIA REPAIR     • HIP TROCHANTERIC NAILING WITH INTRAMEDULLARY HIP SCREW Left 2/2/2017    Procedure: HIP TROCANTERIC NAILING LONG WITH INTRAMEDULLARY HIP SCREW WITH CAST APPLICATION TO LEFT HAND;  Surgeon: Beni Culp MD;  Location:  [facility] OR;  Service:        Family History   Problem Relation Age of Onset   • Heart failure Mother    • Liver disease Father    • Heart disease Sister        Social History     Socioeconomic History   • Marital status: Single   Tobacco Use   • Smoking status: Former   • Smokeless tobacco: Never   • Tobacco  comments:     quit 55 years ago   Vaping Use   • Vaping Use: Never used   Substance and Sexual Activity   • Alcohol use: No   • Drug use: No   • Sexual activity: Not Currently           Objective   Physical Exam  Vitals and nursing note reviewed.   Constitutional:       Appearance: Normal appearance.   HENT:      Head: Normocephalic and atraumatic.      Mouth/Throat:      Mouth: Mucous membranes are moist.   Eyes:      Extraocular Movements: Extraocular movements intact.      Pupils: Pupils are equal, round, and reactive to light.   Cardiovascular:      Rate and Rhythm: Normal rate and regular rhythm.      Heart sounds: Normal heart sounds.   Pulmonary:      Effort: Pulmonary effort is normal.      Breath sounds: Normal breath sounds.   Abdominal:      General: Bowel sounds are normal.      Palpations: Abdomen is soft.      Tenderness: There is no abdominal tenderness.   Skin:     General: Skin is warm and dry.   Neurological:      General: No focal deficit present.      Mental Status: She is alert and oriented to person, place, and time.   Psychiatric:         Mood and Affect: Mood normal.         Behavior: Behavior normal.         Procedures           ED Course                                         Lab Results (last 24 hours)     Procedure Component Value Units Date/Time    CBC w AUTO Differential [[id number]]  (Abnormal) Collected: 04/28/23 1320    Specimen: Blood Updated: 04/28/23 1324    Narrative:      The following orders were created for panel order CBC w AUTO Differential.  Procedure                               Abnormality         Status                     ---------                               -----------         ------                     CBC Auto Differential[[id number]]        Abnormal            Final result                 Please view results for these tests on the individual orders.    Urinalysis With Culture If Indicated - Urine, Clean Catch [[id number]]  (Abnormal) Collected: 04/28/23 1320     Specimen: Urine, Clean Catch Updated: 04/28/23 1333     Color, UA Yellow     Appearance, UA Clear     pH, UA 6.0     Specific Gravity, UA 1.020     Glucose, UA Negative     Ketones, UA Negative     Bilirubin, UA Negative     Blood, UA Negative     Protein, UA Negative     Leuk Esterase, UA Trace     Nitrite, UA Positive     Urobilinogen, UA 0.2 E.U./dL    Narrative:      In absence of clinical symptoms, the presence of pyuria, bacteria, and/or nitrites on the urinalysis result does not correlate with infection.    CBC Auto Differential [[id number]]  (Abnormal) Collected: 04/28/23 1320    Specimen: Blood Updated: 04/28/23 1324     WBC 8.30 10*3/mm3      RBC 2.57 10*6/mm3      Hemoglobin <6.0 g/dL      Hematocrit <21.0 %      MCV 68.9 fL      MCH 17.5 pg      MCHC 25.4 g/dL      RDW 17.3 %      MPV 7.5 fL      Platelets 572 10*3/mm3      Neutrophil % 73.2 %      Lymphocyte % 17.7 %      Auto Mixed Cells % 9.1 %      Neutrophils, Absolute 6.00 10*3/mm3      Lymphocytes, Absolute 1.50 10*3/mm3      Auto Mixed Cells # 0.80 10*3/mm3     Urine Culture - Urine, Urine, Clean Catch [[id number]] Collected: 04/28/23 1320    Specimen: Urine, Clean Catch Updated: 04/28/23 1330    Urinalysis, Microscopic Only - Urine, Clean Catch [[id number]]  (Abnormal) Collected: 04/28/23 1320    Specimen: Urine, Clean Catch Updated: 04/28/23 1612     RBC, UA 0-2 /HPF      WBC, UA 13-20 /HPF      Bacteria, UA 4+ /HPF      Squamous Epithelial Cells, UA 0-2 /HPF      Hyaline Casts, UA None Seen /LPF      Methodology Automated Microscopy    CBC & Differential [[id number]]  (Abnormal) Collected: 04/28/23 1553    Specimen: Blood Updated: 04/28/23 1613    Narrative:      The following orders were created for panel order CBC & Differential.  Procedure                               Abnormality         Status                     ---------                               -----------         ------                     CBC Auto Differential[[id number]]         Abnormal            Final result                 Please view results for these tests on the individual orders.    Basic Metabolic Panel [[id number]]  (Abnormal) Collected: 04/28/23 1553    Specimen: Blood Updated: 04/28/23 1623     Glucose 93 mg/dL      BUN 11 mg/dL      Creatinine 0.66 mg/dL      Sodium 138 mmol/L      Potassium 4.0 mmol/L      Chloride 106 mmol/L      CO2 24.0 mmol/L      Calcium 8.0 mg/dL      BUN/Creatinine Ratio 16.7     Anion Gap 8.0 mmol/L      eGFR 90.5 mL/min/1.73     Narrative:      GFR Normal >60  Chronic Kidney Disease <60  Kidney Failure <15    The GFR formula is only valid for adults with stable renal function between ages 18 and 70.    CBC Auto Differential [[id number]]  (Abnormal) Collected: 04/28/23 1553    Specimen: Blood Updated: 04/28/23 1613     WBC 8.83 10*3/mm3      RBC 2.40 10*6/mm3      Hemoglobin 4.2 g/dL      Hematocrit 17.1 %      MCV 71.3 fL      MCH 17.5 pg      MCHC 24.6 g/dL      RDW 18.7 %      RDW-SD 47.5 fl      MPV 9.2 fL      Platelets 476 10*3/mm3      Neutrophil % 83.8 %      Lymphocyte % 8.9 %      Monocyte % 6.0 %      Eosinophil % 0.6 %      Basophil % 0.2 %      Immature Grans % 0.5 %      Neutrophils, Absolute 7.40 10*3/mm3      Lymphocytes, Absolute 0.79 10*3/mm3      Monocytes, Absolute 0.53 10*3/mm3      Eosinophils, Absolute 0.05 10*3/mm3      Basophils, Absolute 0.02 10*3/mm3      Immature Grans, Absolute 0.04 10*3/mm3      nRBC 0.0 /100 WBC     Ferritin [[id number]]  (Normal) Collected: 04/28/23 1553    Specimen: Blood Updated: 04/28/23 1715     Ferritin 17.54 ng/mL     Narrative:      Results may be falsely decreased if patient taking Biotin.      Iron Profile [[id number]]  (Abnormal) Collected: 04/28/23 1553    Specimen: Blood Updated: 04/28/23 1711     Iron 11 mcg/dL      Iron Saturation 2 %      Transferrin 336 mg/dL      TIBC 501 mcg/dL     Reticulocytes [[id number]]  (Abnormal) Collected: 04/28/23 1553    Specimen: Blood Updated: 04/28/23 1705      Reticulocyte % 3.42 %      Reticulocyte Absolute 0.0824 10*6/mm3     Lactate Dehydrogenase [[id number]]  (Normal) Collected: 04/28/23 1553    Specimen: Blood Updated: 04/28/23 1709      U/L           Medical Decision Making  77-year-old female was found to be anemic here in the emergency room.  Patient had a hemoglobin of 4.5.  Patient has no history of any black tarry stools or bloody stools.  Patient is in no distress.  I spoke with the hospitalist Dr. Arora who has accepted the patient under the services of Dr. Bryan Torres.  Patient did have 1 unit of blood ordered here in the emergency room.    Anemia, unspecified type: acute illness or injury  Amount and/or Complexity of Data Reviewed  Labs: ordered. Decision-making details documented in ED Course.      Risk  Prescription drug management.  Decision regarding hospitalization.          Final diagnoses:   Anemia, unspecified type       ED Disposition  ED Disposition     ED Disposition   Decision to Admit    Condition   --    Comment   Level of Care: Med/Surg [1]   Diagnosis: Anemia, unspecified type [[id number]]   Admitting Physician: BRYAN TORRES [[id number]]   Attending Physician: BRYAN TORRES [[id number]]               No follow-up provider specified.       Medication List      Changed    metoprolol succinate XL 50 MG 24 hr tablet  Commonly known as: TOPROL-XL  Take 1.5 tablets by mouth Daily.  What changed: how much to take             Loco Allan MD  04/28/23 [id number]

## 2023-04-28 NOTE — TELEPHONE ENCOUNTER
Attempted to reach the on-call provider... no answer... the voice mailbox is full and cannot accept messages.  Secure Chat message sent.  
How Severe Is Your Condition?: moderate

## 2023-04-29 ENCOUNTER — READMISSION MANAGEMENT (OUTPATIENT)
Dept: CALL CENTER | Facility: HOSPITAL | Age: 78
End: 2023-04-29
Payer: COMMERCIAL

## 2023-04-29 VITALS
TEMPERATURE: 97.2 F | BODY MASS INDEX: 19.51 KG/M2 | HEART RATE: 96 BPM | SYSTOLIC BLOOD PRESSURE: 134 MMHG | WEIGHT: 106.04 LBS | OXYGEN SATURATION: 96 % | DIASTOLIC BLOOD PRESSURE: 74 MMHG | RESPIRATION RATE: 20 BRPM | HEIGHT: 62 IN

## 2023-04-29 PROBLEM — I50.22 CHRONIC HFREF (HEART FAILURE WITH REDUCED EJECTION FRACTION): Status: ACTIVE | Noted: 2023-01-01

## 2023-04-29 LAB
ANION GAP SERPL CALCULATED.3IONS-SCNC: 8 MMOL/L (ref 5–15)
BUN SERPL-MCNC: 14 MG/DL (ref 8–23)
BUN/CREAT SERPL: 25.5 (ref 7–25)
CALCIUM SPEC-SCNC: 8.1 MG/DL (ref 8.6–10.5)
CHLORIDE SERPL-SCNC: 105 MMOL/L (ref 98–107)
CO2 SERPL-SCNC: 24 MMOL/L (ref 22–29)
CREAT SERPL-MCNC: 0.55 MG/DL (ref 0.57–1)
DEPRECATED RDW RBC AUTO: 53.6 FL (ref 37–54)
EGFRCR SERPLBLD CKD-EPI 2021: 94.5 ML/MIN/1.73
ERYTHROCYTE [DISTWIDTH] IN BLOOD BY AUTOMATED COUNT: 19.9 % (ref 12.3–15.4)
GLUCOSE SERPL-MCNC: 99 MG/DL (ref 65–99)
HAPTOGLOB SERPL-MCNC: 314 MG/DL (ref 30–200)
HCT VFR BLD AUTO: 21.9 % (ref 34–46.6)
HCT VFR BLD AUTO: 25.9 % (ref 34–46.6)
HCT VFR BLD AUTO: 29 % (ref 34–46.6)
HGB BLD-MCNC: 5.9 G/DL (ref 12–15.9)
HGB BLD-MCNC: 7.6 G/DL (ref 12–15.9)
HGB BLD-MCNC: 8.2 G/DL (ref 12–15.9)
MCH RBC QN AUTO: 21.7 PG (ref 26.6–33)
MCHC RBC AUTO-ENTMCNC: 29.3 G/DL (ref 31.5–35.7)
MCV RBC AUTO: 74 FL (ref 79–97)
PLATELET # BLD AUTO: 486 10*3/MM3 (ref 140–450)
PMV BLD AUTO: 9.1 FL (ref 6–12)
POTASSIUM SERPL-SCNC: 4 MMOL/L (ref 3.5–5.2)
RBC # BLD AUTO: 3.5 10*6/MM3 (ref 3.77–5.28)
SODIUM SERPL-SCNC: 137 MMOL/L (ref 136–145)
TSH SERPL DL<=0.05 MIU/L-ACNC: 1.58 UIU/ML (ref 0.27–4.2)
WBC NRBC COR # BLD: 10.14 10*3/MM3 (ref 3.4–10.8)

## 2023-04-29 PROCEDURE — 85018 HEMOGLOBIN: CPT | Performed by: NURSE PRACTITIONER

## 2023-04-29 PROCEDURE — P9016 RBC LEUKOCYTES REDUCED: HCPCS

## 2023-04-29 PROCEDURE — 84443 ASSAY THYROID STIM HORMONE: CPT | Performed by: NURSE PRACTITIONER

## 2023-04-29 PROCEDURE — 36415 COLL VENOUS BLD VENIPUNCTURE: CPT | Performed by: NURSE PRACTITIONER

## 2023-04-29 PROCEDURE — G0378 HOSPITAL OBSERVATION PER HR: HCPCS

## 2023-04-29 PROCEDURE — 36430 TRANSFUSION BLD/BLD COMPNT: CPT

## 2023-04-29 PROCEDURE — 85027 COMPLETE CBC AUTOMATED: CPT | Performed by: NURSE PRACTITIONER

## 2023-04-29 PROCEDURE — 85014 HEMATOCRIT: CPT | Performed by: NURSE PRACTITIONER

## 2023-04-29 PROCEDURE — 86900 BLOOD TYPING SEROLOGIC ABO: CPT

## 2023-04-29 PROCEDURE — 80048 BASIC METABOLIC PNL TOTAL CA: CPT | Performed by: NURSE PRACTITIONER

## 2023-04-29 PROCEDURE — 25010000002 NA FERRIC GLUC CPLX PER 12.5 MG: Performed by: NURSE PRACTITIONER

## 2023-04-29 RX ORDER — MELATONIN
1000 DAILY
Status: DISCONTINUED | OUTPATIENT
Start: 2023-04-29 | End: 2023-04-29 | Stop reason: HOSPADM

## 2023-04-29 RX ORDER — MELATONIN
1000 DAILY
Qty: 30 TABLET | Refills: 2 | Status: SHIPPED | OUTPATIENT
Start: 2023-04-29

## 2023-04-29 RX ADMIN — Medication 1000 UNITS: at 17:03

## 2023-04-29 RX ADMIN — SODIUM CHLORIDE 250 MG: 9 INJECTION, SOLUTION INTRAVENOUS at 08:07

## 2023-04-29 RX ADMIN — OXYCODONE HYDROCHLORIDE 15 MG: 5 TABLET ORAL at 04:08

## 2023-04-29 RX ADMIN — Medication 10 ML: at 08:08

## 2023-04-29 RX ADMIN — OXYCODONE HYDROCHLORIDE 15 MG: 5 TABLET ORAL at 17:02

## 2023-04-29 RX ADMIN — OXYCODONE HYDROCHLORIDE 15 MG: 5 TABLET ORAL at 11:56

## 2023-04-29 RX ADMIN — METOPROLOL SUCCINATE 50 MG: 50 TABLET, EXTENDED RELEASE ORAL at 08:08

## 2023-04-29 RX ADMIN — SPIRONOLACTONE 25 MG: 25 TABLET ORAL at 08:08

## 2023-04-29 NOTE — PLAN OF CARE
Goal Outcome Evaluation:  Plan of Care Reviewed With: patient        Progress: no change  Outcome Evaluation: A&Ox4. VSS. C/o pain throughout shift, scheduled and PRN medication given. Received 2 units PRBC this shift. Voids without difficulty. Appears to be resting well this shift. Call light in reach, safety maintained.

## 2023-04-29 NOTE — PLAN OF CARE
Goal Outcome Evaluation:  Plan of Care Reviewed With: patient        Progress: improving  Outcome Evaluation: Pt A&Ox4. Up x1 to BSC. C/o of pain w/ PRN pain meds given w/ good relief. Resting well. Call light in reach. Safety maintained. Will cont to monitor.

## 2023-04-29 NOTE — PLAN OF CARE
Goal Outcome Evaluation:  Plan of Care Reviewed With: patient, spouse            Patient is received from  ER. Alert and oriented x 4. VSS. RA. Weakness present. Pale looking. Chronic pain. PRN med given with good relief. 1 person assist BSC/BR. Voiding. LBM this morning per patient. Blood transfusion started. Tolerated well. Safety education provided. Safety precautions maintained. Tele monitoring initiated. SCDs for VTE. . Will continue to monitor.

## 2023-04-29 NOTE — DISCHARGE SUMMARY
[facility] Medicine Services  DISCHARGE SUMMARY       Date of Admission: 4/28/2023  Date of Discharge:  4/29/2023  Primary Care Physician: Urban Main MD    Presenting Problem/History of Present Illness:  Abnormal labs, shortness of breath, weakness    Final Discharge Diagnoses:  Active Hospital Problems    Diagnosis    • **Anemia, unspecified type    • Chronic HFrEF (heart failure with reduced ejection fraction) (HCC)    • AICD (automatic cardioverter/defibrillator) present    • SOB (shortness of breath)    • Cardiomyopathy (HCC) EF 20-25%    • Chronic pain disorder    • Dilated cardiomyopathy (HCC)        Pertinent Test Results:   Results for orders placed in visit on 02/15/22    Adult Transthoracic Echo Complete W/ Cont if Necessary Per Protocol    Interpretation Summary  · The left ventricular cavity is moderately dilated.  · Estimated right ventricular systolic pressure from tricuspid regurgitation is markedly elevated (>55 mmHg).  · Severe pulmonary hypertension is present.  · The right atrial cavity is moderately dilated.  · Left ventricular diastolic function is consistent with (grade Ia w/high LAP) impaired relaxation.  · Left ventricular ejection fraction appears to be 21 - 25%. Left ventricular systolic function is severely decreased.  · Moderate tricuspid valve regurgitation is present.  · The right ventricular cavity is mildly dilated.    SEVERE DILATED CARDIOMYOPATHY      Imaging Results (All)     None        LAB RESULTS:      Lab 04/29/23  0523 04/28/23  2341 04/28/23  1553 04/28/23  1320 04/27/23  1017   WBC 10.14  --  8.83 8.30 9.24   HEMOGLOBIN 7.6* 5.9* 4.2* <6.0* 4.5*   HEMATOCRIT 25.9* 21.9* 17.1* <21.0* 17.7*   PLATELETS 486*  --  476* 572* 530*   NEUTROS ABS  --   --  7.40* 6.00 7.94*   IMMATURE GRANS (ABS)  --   --  0.04  --   --    LYMPHS ABS  --   --  0.79 1.50 CANCELED  0.84   MONOS ABS  --   --  0.53  --  0.37   EOS ABS  --   --  0.05  --   CANCELED   EOSINOPHIL ABS  --   --   --   --  0.09   EOSINOPHIL %  --   --   --   --  1.0   MCV 74.0*  --  71.3* 68.9* 67.6*   LDH  --   --  204  --   --          Lab 04/29/23  0523 04/28/23  1553 04/27/23  1017   SODIUM 137 138 137   POTASSIUM 4.0 4.0 4.3   CHLORIDE 105 106 104   TOTAL CO2  --   --  25.5   CO2 24.0 24.0  --    ANION GAP 8.0 8.0  --    BUN 14 11 11   CREATININE 0.55* 0.66 0.57   EGFR 94.5 90.5  --    GLUCOSE 99 93 94   CALCIUM 8.1* 8.0* 8.9   TSH 1.580  --  1.680         Lab 04/27/23  1017   ALBUMIN 3.5   ALT (SGPT) 13   AST (SGOT) 14   BILIRUBIN 0.2   ALK PHOS 120*             Lab 04/27/23  1017   LDL CHOL 64   HDL CHOL 43   TRIGLYCERIDES 55         Lab 04/28/23  1623 04/28/23  1553   IRON  --  11*   IRON SATURATION  --  2*   TIBC  --  501   TRANSFERRIN  --  336   FERRITIN  --  17.54   ABO TYPING O  --    RH TYPING Positive  --    ANTIBODY SCREEN Negative  --          Brief Urine Lab Results  (Last result in the past 365 days)      Color   Clarity   Blood   Leuk Est   Nitrite   Protein   CREAT   Urine HCG        04/28/23 1320 Yellow   Clear   Negative   Trace   Positive   Negative               Microbiology Results (last 10 days)     ** No results found for the last 240 hours. **          Hospital Course:   Patient is a 77-year-old  female with past medical history significant for lymphoma (treated by Dr. Govea many years ago and reports that she has been in remission), chronic systolic congestive heart failure with AICD, the presented to our hospital secondary to abnormal labs, difficulty breathing, and weakness.  On arrival patient was found to have a hemoglobin level of 4.2.  Patient does report a history of anemia in the past, but never this low.  She denies any recent symptoms of melena or bright red blood per rectum.  She denies any hematuria.  She denies any other bleeding symptoms or bruising.  She does not take any blood thinning medications.  She has noticed more profound  "fatigue and difficulty breathing as compared to her norm.  She has recently seen her cardiologist, Dr. Gordillo, who also has adjusted some of her heart failure medications.    She was admitted to the hospitalist service, transfused with 2 units of packed red blood cells and her most recent posttransfusion hemoglobin was 7.6.  Patient reports that she feels markedly better.  Given her history of chronic systolic congestive heart failure I did treat her with a low-dose of IV diuretics after her first unit of packed red blood cells.  She is euvolemic on examination.  She reports having much more energy following transfusion, and in fact is eager to get back home as she reports that when she is away from home she worries about her dogs, and really wants to sleep in her own bed.  We discussed that additional work-up is needed to determine the etiology of her anemia.  I did order a fecal occult blood test but she has not been able to produce a stool specimen for analysis.  We also discussed the possibility of needing to see a hematologist, as well.  I am agreeable with discharging patient from the hospital today with a plan for very close outpatient follow-up with her primary care provider Dr. Main early next week.  In fact, was able to speak with Dr. Main this afternoon, and really appreciate his assistance with coordinating her care moving forward in the outpatient setting.    Please also note she was administered 2 doses of IV ferric gluconate during this hospitalization as well as her iron level was 11, iron saturation was only 2, and TIBC was 501.    Her vitamin D was also low and I will send her home with a prescription for a vitamin D supplement.    Physical Exam on Discharge:  /55 (BP Location: Left arm, Patient Position: Lying)   Pulse 86   Temp 97.2 °F (36.2 °C)   Resp 18   Ht 157.5 cm (62\")   Wt 48.1 kg (106 lb 0.7 oz)   LMP  (LMP Unknown)   SpO2 95%   BMI 19.40 kg/m²   Physical Exam  Vitals " reviewed.   HENT:      Head: Normocephalic.      Mouth/Throat:      Comments: Wears mask  Pulmonary:      Effort: Pulmonary effort is normal. No respiratory distress.   Skin:     Coloration: Skin is not pale.      Comments: Color is much better!   Neurological:      General: No focal deficit present.      Mental Status: She is alert.   Psychiatric:         Mood and Affect: Mood normal.       Condition on Discharge: medically stable    Discharge Disposition:  Home or Self Care    Discharge Medications:     Discharge Medications      New Medications      Instructions Start Date   cholecalciferol 25 MCG (1000 UT) tablet  Commonly known as: VITAMIN D3   1,000 Units, Oral, Daily         Changes to Medications      Instructions Start Date   metoprolol succinate XL 50 MG 24 hr tablet  Commonly known as: TOPROL-XL  What changed: how much to take   75 mg, Oral, Daily         Continue These Medications      Instructions Start Date   acetaminophen 650 MG 8 hr tablet  Commonly known as: TYLENOL   650 mg, Oral, Every 8 Hours PRN      busPIRone 5 MG tablet  Commonly known as: BUSPAR   5 mg, Oral, 3 Times Daily PRN      cefdinir 300 MG capsule  Commonly known as: OMNICEF   300 mg, Oral, 2 Times Daily      furosemide 20 MG tablet  Commonly known as: LASIX   20 mg, Oral, Daily PRN      ivabradine HCl 5 MG tablet tablet  Commonly known as: CORLANOR   5 mg, Oral, 2 Times Daily With Meals      oxyCODONE ER 15 MG tablet extended-release 12 hour  Commonly known as: oxyCONTIN   15 mg, Oral, Every 12 Hours Scheduled      oxyCODONE 15 MG immediate release tablet  Commonly known as: ROXICODONE   15 mg, Oral, 4 Times Daily PRN      sacubitril-valsartan 24-26 MG tablet  Commonly known as: ENTRESTO   1 tablet, Oral, 2 Times Daily      spironolactone 25 MG tablet  Commonly known as: ALDACTONE   25 mg, Oral, Daily             This patient has current or prior documentation of an left ventricular ejection fraction (LVEF) of less than or equal to  40%.    The patient was prescribed or already taking an ACE/ARB.    The patient was prescribed already taking bisoprolol, carvedilol, or sustained release metoprolol succinate.     Discharge Diet: heart healthy    Activity at Discharge: as tolerated    Follow-up Appointments:   Future Appointments   Date Time Provider Department Center   7/24/2023  1:15 PM Austin Gordillo MD [facility] CD PAD PAD   10/30/2023 10:15 AM Urban Main MD [facility] PC VSQ PAD   2/27/2024  1:30 PM [facility] HEART GROUP PAD DEVICE CHECK [facility] CD PAD PAD   I spoke with Dr. Main this afternoon to provide update and he will coordinate prompt follow-up appointment with Ms. Villafuerte this week for next steps.    Test Results Pending at Discharge: none    Electronically signed by Bryan Torres MD, 04/29/23, 16:09 CDT.    Time: 25 minutes.

## 2023-04-30 LAB
BACTERIA SPEC AEROBE CULT: ABNORMAL
BH BB BLOOD EXPIRATION DATE: NORMAL
BH BB BLOOD EXPIRATION DATE: NORMAL
BH BB BLOOD TYPE BARCODE: 5100
BH BB BLOOD TYPE BARCODE: 9500
BH BB DISPENSE STATUS: NORMAL
BH BB DISPENSE STATUS: NORMAL
BH BB PRODUCT CODE: NORMAL
BH BB PRODUCT CODE: NORMAL
BH BB UNIT NUMBER: NORMAL
BH BB UNIT NUMBER: NORMAL
CROSSMATCH INTERPRETATION: NORMAL
CROSSMATCH INTERPRETATION: NORMAL
UNIT  ABO: NORMAL
UNIT  ABO: NORMAL
UNIT  RH: NORMAL
UNIT  RH: NORMAL

## 2023-04-30 NOTE — OUTREACH NOTE
Prep Survey    Flowsheet Row Responses   [facility] facility patient discharged from? [place]   Is LACE score < 7 ? Yes   Eligibility [facility]   Date of Admission 04/28/23   Date of Discharge 04/29/23   Discharge Disposition Home or Self Care   Discharge diagnosis Anemia   Does the patient have one of the following disease processes/diagnoses(primary or secondary)? Other   Does the patient have Home health ordered? No   Is there a DME ordered? No   Prep survey completed? Yes          KENYA MOORE - Registered Nurse

## 2023-05-01 ENCOUNTER — TRANSITIONAL CARE MANAGEMENT TELEPHONE ENCOUNTER (OUTPATIENT)
Dept: CALL CENTER | Facility: HOSPITAL | Age: 78
End: 2023-05-01
Payer: COMMERCIAL

## 2023-05-01 NOTE — OUTREACH NOTE
Call Center TCM Note    Flowsheet Row Responses   [facility] patient discharged from? [place]   Does the patient have one of the following disease processes/diagnoses(primary or secondary)? Other   TCM attempt successful? Yes   Call start time 1454   Call end time 1457   Discharge diagnosis Anemia   Meds reviewed with patient/caregiver? Yes   Is the patient having any side effects they believe may be caused by any medication additions or changes? No   Does the patient have all medications ordered at discharge? No   What is keeping the patient from filling the prescriptions? --  [pt states she plans on picking up her new medication at her pharmacy ]   Nursing Interventions No intervention needed   Is the patient taking all medications as directed (includes completed medication regime)? Yes   Does the patient have an appointment with their PCP within 7 days of discharge? No   Nursing Interventions Patient declined scheduling/rescheduling appointment at this time, Routed TCM call to PCP office, PCP office requested to make appointment - message sent   Has home health visited the patient within 72 hours of discharge? N/A   Psychosocial issues? No   Did the patient receive a copy of their discharge instructions? Yes   Nursing interventions Reviewed instructions with patient   What is the patient's perception of their health status since discharge? Improving   Is the patient/caregiver able to teach back signs and symptoms related to disease process for when to call PCP? Yes   Is the patient/caregiver able to teach back signs and symptoms related to disease process for when to call 911? Yes   Is the patient/caregiver able to teach back the hierarchy of who to call/visit for symptoms/problems? PCP, Specialist, Home health nurse, Urgent Care, ED, 911 Yes   If the patient is a current smoker, are they able to teach back resources for cessation? Not a smoker   TCM call completed? Yes   Call end time 1457          Kaylan  LEIGHANN Beckford RN    5/1/2023, 14:58 CDT

## 2023-05-02 ENCOUNTER — TELEPHONE (OUTPATIENT)
Dept: INTERNAL MEDICINE | Facility: CLINIC | Age: 78
End: 2023-05-02
Payer: COMMERCIAL

## 2023-05-03 ENCOUNTER — HOSPITAL ENCOUNTER (INPATIENT)
Facility: HOSPITAL | Age: 78
LOS: 1 days | Discharge: HOME | End: 2023-05-04
Attending: INTERNAL MEDICINE | Admitting: FAMILY MEDICINE
Payer: MEDICARE

## 2023-05-03 ENCOUNTER — OFFICE VISIT (OUTPATIENT)
Dept: INTERNAL MEDICINE | Facility: CLINIC | Age: 78
End: 2023-05-03
Payer: MEDICARE

## 2023-05-03 VITALS
SYSTOLIC BLOOD PRESSURE: 142 MMHG | TEMPERATURE: 97.4 F | WEIGHT: 105 LBS | OXYGEN SATURATION: 98 % | HEART RATE: 160 BPM | BODY MASS INDEX: 19.32 KG/M2 | DIASTOLIC BLOOD PRESSURE: 80 MMHG | HEIGHT: 62 IN

## 2023-05-03 DIAGNOSIS — I47.1 PAROXYSMAL SVT (SUPRAVENTRICULAR TACHYCARDIA): ICD-10-CM

## 2023-05-03 DIAGNOSIS — D64.9 ANEMIA, UNSPECIFIED TYPE: Primary | ICD-10-CM

## 2023-05-03 DIAGNOSIS — E55.9 VITAMIN D DEFICIENCY: ICD-10-CM

## 2023-05-03 DIAGNOSIS — Z95.810 AICD (AUTOMATIC CARDIOVERTER/DEFIBRILLATOR) PRESENT: ICD-10-CM

## 2023-05-03 DIAGNOSIS — I42.0 DILATED CARDIOMYOPATHY: ICD-10-CM

## 2023-05-03 DIAGNOSIS — I50.22 CHRONIC HFREF (HEART FAILURE WITH REDUCED EJECTION FRACTION): ICD-10-CM

## 2023-05-03 DIAGNOSIS — I48.91 ATRIAL FIBRILLATION WITH RVR: ICD-10-CM

## 2023-05-03 DIAGNOSIS — I50.22 CHRONIC SYSTOLIC CONGESTIVE HEART FAILURE: ICD-10-CM

## 2023-05-03 LAB
ABO GROUP BLD: NORMAL
ALBUMIN SERPL-MCNC: 3.8 G/DL (ref 3.5–5.2)
ALBUMIN/GLOB SERPL: 1.3 G/DL
ALP SERPL-CCNC: 118 U/L (ref 39–117)
ALT SERPL W P-5'-P-CCNC: 7 U/L (ref 1–33)
ANION GAP SERPL CALCULATED.3IONS-SCNC: 9 MMOL/L (ref 5–15)
AST SERPL-CCNC: 13 U/L (ref 1–32)
BASOPHILS # BLD AUTO: 0.04 10*3/MM3 (ref 0–0.2)
BASOPHILS NFR BLD AUTO: 0.4 % (ref 0–1.5)
BILIRUB SERPL-MCNC: 0.4 MG/DL (ref 0–1.2)
BLD GP AB SCN SERPL QL: NEGATIVE
BUN SERPL-MCNC: 14 MG/DL (ref 8–23)
BUN/CREAT SERPL: 21.2 (ref 7–25)
CALCIUM SPEC-SCNC: 8.9 MG/DL (ref 8.6–10.5)
CHLORIDE SERPL-SCNC: 106 MMOL/L (ref 98–107)
CO2 SERPL-SCNC: 25 MMOL/L (ref 22–29)
CREAT SERPL-MCNC: 0.66 MG/DL (ref 0.57–1)
DEPRECATED RDW RBC AUTO: 69.3 FL (ref 37–54)
EGFRCR SERPLBLD CKD-EPI 2021: 90.5 ML/MIN/1.73
EOSINOPHIL # BLD AUTO: 0.16 10*3/MM3 (ref 0–0.4)
EOSINOPHIL NFR BLD AUTO: 1.7 % (ref 0.3–6.2)
ERYTHROCYTE [DISTWIDTH] IN BLOOD BY AUTOMATED COUNT: 27.4 % (ref 12.3–15.4)
GLOBULIN UR ELPH-MCNC: 2.9 GM/DL
GLUCOSE SERPL-MCNC: 95 MG/DL (ref 65–99)
HCT VFR BLD AUTO: 32.5 % (ref 34–46.6)
HGB BLD-MCNC: 8.7 G/DL (ref 12–15.9)
IMM GRANULOCYTES # BLD AUTO: 0.05 10*3/MM3 (ref 0–0.05)
IMM GRANULOCYTES NFR BLD AUTO: 0.5 % (ref 0–0.5)
LYMPHOCYTES # BLD AUTO: 1.11 10*3/MM3 (ref 0.7–3.1)
LYMPHOCYTES NFR BLD AUTO: 11.8 % (ref 19.6–45.3)
MCH RBC QN AUTO: 21.8 PG (ref 26.6–33)
MCHC RBC AUTO-ENTMCNC: 26.8 G/DL (ref 31.5–35.7)
MCV RBC AUTO: 81.5 FL (ref 79–97)
MONOCYTES # BLD AUTO: 0.67 10*3/MM3 (ref 0.1–0.9)
MONOCYTES NFR BLD AUTO: 7.1 % (ref 5–12)
NEUTROPHILS NFR BLD AUTO: 7.41 10*3/MM3 (ref 1.7–7)
NEUTROPHILS NFR BLD AUTO: 78.5 % (ref 42.7–76)
NRBC BLD AUTO-RTO: 0 /100 WBC (ref 0–0.2)
PLATELET # BLD AUTO: 426 10*3/MM3 (ref 140–450)
PMV BLD AUTO: 9.2 FL (ref 6–12)
POTASSIUM SERPL-SCNC: 4.4 MMOL/L (ref 3.5–5.2)
PROT SERPL-MCNC: 6.7 G/DL (ref 6–8.5)
RBC # BLD AUTO: 3.99 10*6/MM3 (ref 3.77–5.28)
RH BLD: POSITIVE
SODIUM SERPL-SCNC: 140 MMOL/L (ref 136–145)
T&S EXPIRATION DATE: NORMAL
TSH SERPL DL<=0.05 MIU/L-ACNC: 1.16 UIU/ML (ref 0.27–4.2)
WBC NRBC COR # BLD: 9.44 10*3/MM3 (ref 3.4–10.8)

## 2023-05-03 PROCEDURE — 85025 COMPLETE CBC W/AUTO DIFF WBC: CPT | Performed by: FAMILY MEDICINE

## 2023-05-03 PROCEDURE — 84443 ASSAY THYROID STIM HORMONE: CPT | Performed by: FAMILY MEDICINE

## 2023-05-03 PROCEDURE — 93005 ELECTROCARDIOGRAM TRACING: CPT | Performed by: FAMILY MEDICINE

## 2023-05-03 PROCEDURE — 80053 COMPREHEN METABOLIC PANEL: CPT | Performed by: FAMILY MEDICINE

## 2023-05-03 PROCEDURE — 86900 BLOOD TYPING SEROLOGIC ABO: CPT | Performed by: FAMILY MEDICINE

## 2023-05-03 PROCEDURE — 86850 RBC ANTIBODY SCREEN: CPT | Performed by: FAMILY MEDICINE

## 2023-05-03 PROCEDURE — 93010 ELECTROCARDIOGRAM REPORT: CPT | Performed by: STUDENT IN AN ORGANIZED HEALTH CARE EDUCATION/TRAINING PROGRAM

## 2023-05-03 PROCEDURE — 86901 BLOOD TYPING SEROLOGIC RH(D): CPT | Performed by: FAMILY MEDICINE

## 2023-05-03 RX ORDER — METOPROLOL SUCCINATE 50 MG/1
75 TABLET, EXTENDED RELEASE ORAL
Status: DISCONTINUED | OUTPATIENT
Start: 2023-05-03 | End: 2023-05-03

## 2023-05-03 RX ORDER — MELATONIN
1000 DAILY
Status: DISCONTINUED | OUTPATIENT
Start: 2023-05-03 | End: 2023-05-04 | Stop reason: HOSPADM

## 2023-05-03 RX ORDER — OXYCODONE HYDROCHLORIDE 15 MG/1
15 TABLET, FILM COATED, EXTENDED RELEASE ORAL 4 TIMES DAILY PRN
Status: DISCONTINUED | OUTPATIENT
Start: 2023-05-03 | End: 2023-05-03

## 2023-05-03 RX ORDER — ACETAMINOPHEN 325 MG/1
650 TABLET ORAL EVERY 4 HOURS PRN
Status: DISCONTINUED | OUTPATIENT
Start: 2023-05-03 | End: 2023-05-03 | Stop reason: SDUPTHER

## 2023-05-03 RX ORDER — NITROGLYCERIN 0.4 MG/1
0.4 TABLET SUBLINGUAL
Status: DISCONTINUED | OUTPATIENT
Start: 2023-05-03 | End: 2023-05-04 | Stop reason: HOSPADM

## 2023-05-03 RX ORDER — OXYCODONE HYDROCHLORIDE 5 MG/1
15 TABLET ORAL EVERY 4 HOURS PRN
Status: DISCONTINUED | OUTPATIENT
Start: 2023-05-03 | End: 2023-05-04 | Stop reason: HOSPADM

## 2023-05-03 RX ORDER — SPIRONOLACTONE 25 MG/1
25 TABLET ORAL DAILY
Status: DISCONTINUED | OUTPATIENT
Start: 2023-05-03 | End: 2023-05-04 | Stop reason: HOSPADM

## 2023-05-03 RX ORDER — CEFDINIR 300 MG/1
300 CAPSULE ORAL EVERY 12 HOURS SCHEDULED
Status: DISCONTINUED | OUTPATIENT
Start: 2023-05-03 | End: 2023-05-04 | Stop reason: HOSPADM

## 2023-05-03 RX ORDER — FUROSEMIDE 20 MG/1
20 TABLET ORAL DAILY PRN
Status: DISCONTINUED | OUTPATIENT
Start: 2023-05-03 | End: 2023-05-04 | Stop reason: HOSPADM

## 2023-05-03 RX ORDER — ACETAMINOPHEN 325 MG/1
650 TABLET ORAL EVERY 6 HOURS PRN
Status: DISCONTINUED | OUTPATIENT
Start: 2023-05-03 | End: 2023-05-04 | Stop reason: HOSPADM

## 2023-05-03 RX ORDER — SODIUM CHLORIDE 0.9 % (FLUSH) 0.9 %
10 SYRINGE (ML) INJECTION EVERY 12 HOURS SCHEDULED
Status: DISCONTINUED | OUTPATIENT
Start: 2023-05-03 | End: 2023-05-04 | Stop reason: HOSPADM

## 2023-05-03 RX ORDER — SODIUM CHLORIDE 0.9 % (FLUSH) 0.9 %
10 SYRINGE (ML) INJECTION AS NEEDED
Status: DISCONTINUED | OUTPATIENT
Start: 2023-05-03 | End: 2023-05-04 | Stop reason: HOSPADM

## 2023-05-03 RX ORDER — BUSPIRONE HYDROCHLORIDE 5 MG/1
5 TABLET ORAL 3 TIMES DAILY PRN
Status: DISCONTINUED | OUTPATIENT
Start: 2023-05-03 | End: 2023-05-04 | Stop reason: HOSPADM

## 2023-05-03 RX ORDER — OXYCODONE HYDROCHLORIDE 15 MG/1
15 TABLET, FILM COATED, EXTENDED RELEASE ORAL EVERY 12 HOURS SCHEDULED
Status: DISCONTINUED | OUTPATIENT
Start: 2023-05-03 | End: 2023-05-04 | Stop reason: HOSPADM

## 2023-05-03 RX ORDER — SODIUM CHLORIDE 9 MG/ML
40 INJECTION, SOLUTION INTRAVENOUS AS NEEDED
Status: DISCONTINUED | OUTPATIENT
Start: 2023-05-03 | End: 2023-05-04 | Stop reason: HOSPADM

## 2023-05-03 RX ORDER — ONDANSETRON 2 MG/ML
4 INJECTION INTRAMUSCULAR; INTRAVENOUS EVERY 6 HOURS PRN
Status: DISCONTINUED | OUTPATIENT
Start: 2023-05-03 | End: 2023-05-04 | Stop reason: HOSPADM

## 2023-05-03 RX ADMIN — Medication 1000 UNITS: at 20:29

## 2023-05-03 RX ADMIN — SACUBITRIL AND VALSARTAN 1 TABLET: 24; 26 TABLET, FILM COATED ORAL at 20:25

## 2023-05-03 RX ADMIN — OXYCODONE HYDROCHLORIDE 15 MG: 15 TABLET, FILM COATED, EXTENDED RELEASE ORAL at 20:29

## 2023-05-03 RX ADMIN — OXYCODONE HYDROCHLORIDE 15 MG: 5 TABLET ORAL at 20:34

## 2023-05-03 RX ADMIN — CEFDINIR 300 MG: 300 CAPSULE ORAL at 20:24

## 2023-05-03 RX ADMIN — METOPROLOL SUCCINATE 75 MG: 50 TABLET, EXTENDED RELEASE ORAL at 20:24

## 2023-05-03 RX ADMIN — SPIRONOLACTONE 25 MG: 25 TABLET ORAL at 20:29

## 2023-05-03 RX ADMIN — Medication 10 ML: at 20:26

## 2023-05-03 NOTE — PLAN OF CARE
Goal Outcome Evaluation:            Pt direct admit from PCP to floor, admitted for afib with RVR, however t sinus tachy 130-150 upon arrival, on RA, /85, once pt settled HR has come down, Pt currently -111, EKG obtained Dr. Garza notified and cardizem drip on hold, pt reported she had been anxious at md office, no c/o of SOB or chest pain, has been up ad karan to bathroom, currently eating supper, no distress noted, family at bedside, safety maintained

## 2023-05-03 NOTE — PROGRESS NOTES
"    CC: hospital follow-up for anemia    History:  Mitzi Villafuerte is a 77 y.o. female who presents today for transitional care management after hospitalization.    Date of Discharge: 4/29/2023  TCM call successfully made on: 5/1/2023 by Kaylan Beckford RN    Patient presented today for follow-up after having anemia.  I talked with Dr. Torres on the day of discharge.  Patient was very anxious to get home, was noted to have anemia.  Work-up was not obvious of anything other than iron deficiency anemia at that point.  Dr. Torres requested that I complete the work-up as an outpatient as the patient again was very anxious to be getting home.  Patient received transfusion, and hemoglobin stable.  No active signs of bleeding.  Iron levels were noted to be elevated down at 11, with an iron saturation of 2%.  Patient had been feeling relatively okay except for some fatigue.  Patient presented today and was found of a heart rate of 160 upon arrival.  Patient was very irregular on palpation.  Patient's hemodynamics were relatively preserved with a normal blood pressure.  Patient seemed a little bit winded walking down the cyr.  She indicated that she feels very anxious, and thought taking one of her Xanax would help.  It did not, as her heart rate remained elevated, EKG shows a heart rate in the 150s to 160s on repeat EKGs.      ROS:  Review of Systems    Ms. Villafuerte  reports that she has quit smoking. She has never used smokeless tobacco. She reports that she does not drink alcohol and does not use drugs.    No current outpatient medications on file.      OBJECTIVE:  /80 (BP Location: Left arm, Patient Position: Sitting, Cuff Size: Adult)   Pulse (!) 160   Temp 97.4 °F (36.3 °C) (Temporal)   Ht 157.5 cm (62\")   Wt 47.6 kg (105 lb)   LMP  (LMP Unknown)   SpO2 98%   BMI 19.20 kg/m²    Physical Exam  Vitals reviewed.   Constitutional:       Appearance: She is ill-appearing.   HENT:      Head: Normocephalic and " atraumatic.      Mouth/Throat:      Mouth: Mucous membranes are dry.      Pharynx: Oropharynx is clear.   Eyes:      General: No scleral icterus.     Conjunctiva/sclera: Conjunctivae normal.   Cardiovascular:      Rate and Rhythm: Tachycardia present. Rhythm irregular.      Heart sounds: Murmur heard.   Pulmonary:      Effort: Pulmonary effort is normal. No respiratory distress.   Skin:     General: Skin is warm and dry.      Coloration: Skin is not pale.   Neurological:      General: No focal deficit present.      Mental Status: She is alert and oriented to person, place, and time.   Psychiatric:         Mood and Affect: Mood normal.         Behavior: Behavior normal.         Assessment/Plan    Diagnoses and all orders for this visit:    1. Anemia, unspecified type (Primary)  -     POCT Occult blood x 3, stool    2. Paroxysmal SVT (supraventricular tachycardia)  -     ECG 12 Lead  -     ECG 12 Lead  -     ECG 12 Lead    3. Atrial fibrillation with RVR  -     ECG 12 Lead  -     ECG 12 Lead  -     ECG 12 Lead    4. AICD (automatic cardioverter/defibrillator) present    5. Dilated cardiomyopathy    6. Chronic systolic congestive heart failure    7. Chronic HFrEF (heart failure with reduced ejection fraction) ([facility])    8. Vitamin D deficiency      Reviewed two ECGs, appears to be atrial fibrillation.  Patient not a candidate for anticoagulation given recent anemia with hemoglobin of 4.2  Patient would benefit from GI work up as well as hematology consultation.  Patient would benefit from electrophysiology consultation    Attempted to let the patient take an extra dose of her xanax as she was very anxious but did not improve symptoms at all.        Result Review :  The following data was reviewed by: Urban Main MD on 05/03/2023:  CMP          4/27/2023    10:17 4/28/2023    15:53 4/29/2023    05:23   CMP   Glucose 94   93   99     BUN 11   11   14     Creatinine 0.57   0.66   0.55     EGFR  90.5   94.5     Sodium  137   138   137     Potassium 4.3   4.0   4.0     Chloride 104   106   105     Calcium 8.9   8.0   8.1     Total Protein 6.5       Albumin 3.5       Globulin 3.0       Total Bilirubin 0.2       Alkaline Phosphatase 120       AST (SGOT) 14       ALT (SGPT) 13       BUN/Creatinine Ratio 19.3   16.7   25.5     Anion Gap  8.0   8.0       CBC          4/27/2023    10:17 4/28/2023    13:20 4/28/2023    15:53 4/28/2023    23:41 4/29/2023    05:23   CBC   WBC 9.24   8.30   8.83    10.14     RBC 2.62   2.57   2.40    3.50     Hemoglobin 4.5   <6.0   4.2   5.9   7.6     Hematocrit 17.7   <21.0   17.1   21.9   25.9     MCV 67.6   68.9   71.3    74.0     MCH 17.2   17.5   17.5    21.7     MCHC 25.4   25.4   24.6    29.3     RDW 17.4   17.3   18.7    19.9     Platelets 530   572   476    486           4/29/2023    16:18   CBC   WBC    RBC    Hemoglobin 8.2     Hematocrit 29.0     MCV    MCH    MCHC    RDW    Platelets      CBC w/diff          4/27/2023    10:17 4/28/2023    13:20 4/28/2023    15:53 4/28/2023    23:41 4/29/2023    05:23   CBC w/Diff   WBC 9.24   8.30   8.83    10.14     RBC 2.62   2.57   2.40    3.50     Hemoglobin 4.5   <6.0   4.2   5.9   7.6     Hematocrit 17.7   <21.0   17.1   21.9   25.9     MCV 67.6   68.9   71.3    74.0     MCH 17.2   17.5   17.5    21.7     MCHC 25.4   25.4   24.6    29.3     RDW 17.4   17.3   18.7    19.9     Platelets 530   572   476    486     Neutrophil Rel % CANCELED   73.2   83.8       Immature Granulocyte Rel %   0.5       Lymphocyte Rel % CANCELED   17.7   8.9       Monocyte Rel % CANCELED    6.0       Eosinophil Rel % CANCELED    0.6       Basophil Rel %   0.2             4/29/2023    16:18   CBC w/Diff   WBC    RBC    Hemoglobin 8.2     Hematocrit 29.0     MCV    MCH    MCHC    RDW    Platelets    Neutrophil Rel %    Immature Granulocyte Rel %    Lymphocyte Rel %    Monocyte Rel %    Eosinophil Rel %    Basophil Rel %      Lipid Panel          4/27/2023    10:17   Lipid Panel    Total Cholesterol 119     Triglycerides 55     HDL Cholesterol 43     VLDL Cholesterol 12     LDL Cholesterol  64       TSH          4/27/2023    10:17 4/29/2023    05:23   TSH   TSH 1.680   1.580       BMP          4/27/2023    10:17 4/28/2023    15:53 4/29/2023    05:23   BMP   BUN 11   11   14     Creatinine 0.57   0.66   0.55     Sodium 137   138   137     Potassium 4.3   4.0   4.0     Chloride 104   106   105     CO2 25.5   24.0   24.0     Calcium 8.9   8.0   8.1       HgB          4/27/2023    10:17 4/28/2023    13:20 4/28/2023    15:53 4/28/2023    23:41 4/29/2023    05:23   HGB   Hemoglobin 4.5   <6.0   4.2   5.9   7.6           4/29/2023    16:18   HGB   Hemoglobin 8.2       HCT          4/27/2023    10:17 4/28/2023    13:20 4/28/2023    15:53 4/28/2023    23:41 4/29/2023    05:23   HGT   Hematocrit 17.7   <21.0   17.1   21.9   25.9           4/29/2023    16:18   HGT   Hematocrit 29.0                An After Visit Summary was printed and given to the patient at discharge.  No follow-ups on file. Sooner if problems arise.         TONG Main MD, [facility], FACP  Electronically signed by Urban Main MD, 05/03/23, 4:09 PM CDT.      ECG 12 Lead    Date/Time: 5/3/2023 4:36 PM  Performed by: Urban Main MD  Authorized by: Urban Main MD   Rhythm: atrial fibrillation  Rate: tachycardic  Other findings: non-specific ST-T wave changes    Clinical impression: abnormal EKG  Comments: Atrial fibrillation with RVR        ECG 12 Lead    Date/Time: 5/3/2023 4:36 PM  Performed by: Urban Main MD  Authorized by: Urban Main MD   Comparison: not compared with previous ECG   Rhythm: atrial fibrillation  Rate: tachycardic  QRS axis: normal    Clinical impression: abnormal EKG  Comments: Atrial fibrillation with RVR        This meets criteria for a 15205.  Patient has a Chronic systolic heart failure with an EF of 20%, recent significant anemia of 4.2, and presents today with significant  arrythmia and HR in the 160s.  Called and discussed patient with hospitalist who kindly agrees to admit the patient.  Reviewed an extensive amount of data in addition to discussing the case with another healthcare provider.  Made decision for need for hospitalization due to a high-risk exacerbation of a chronic medical problem.

## 2023-05-03 NOTE — H&P
[facility] Medicine Services  HISTORY AND PHYSICAL    Date of Admission: 5/3/2023  Primary Care Physician: Urban Main MD    Subjective   Primary Historian: Patient    Chief Complaint: Atrial fibrillation with rapid ventricular response    History of Present Illness  Patient had recently gone hospitalized from 4 28-4 29 with anemia.  She had been transfused.  She had been feeling fine.  She went to the doctor's office today for a transitional care management visit.  Upon entering the doctor's office she felt her heart rate start going fast and she was found to be in atrial fibrillation with rapid ventricular response.  She believes she panicked and that is what caused it.  She has felt fine since her discharge from the hospital and in fact she and her friend were planning to go out for steak dinner after seeing Dr. Main.        Review of Systems   Otherwise complete ROS reviewed and negative except as mentioned in the HPI.    Past Medical History:   Past Medical History:   Diagnosis Date   • Broken femur    • Chronic combined systolic (congestive) and diastolic (congestive) heart failure    • Fracture of hip     left, due to lymphoma, treated non-operatively   • Hypertension    • Lymphoma    • Presence of automatic cardioverter/defibrillator (AICD)      Past Surgical History:  Past Surgical History:   Procedure Laterality Date   • CARDIAC ELECTROPHYSIOLOGY PROCEDURE N/A 3/2/2022    Procedure: ICD DC new;  Surgeon: Austin Gordillo MD;  Location: [facility] CATH INVASIVE LOCATION;  Service: Cardiology;  Laterality: N/A;   • CHOLECYSTECTOMY     • HERNIA REPAIR     • HIP TROCHANTERIC NAILING WITH INTRAMEDULLARY HIP SCREW Left 2/2/2017    Procedure: HIP TROCANTERIC NAILING LONG WITH INTRAMEDULLARY HIP SCREW WITH CAST APPLICATION TO LEFT HAND;  Surgeon: Beni Culp MD;  Location: [facility] OR;  Service:      Social History:  reports that she has quit smoking. She  has never used smokeless tobacco. She reports that she does not drink alcohol and does not use drugs.    Family History: family history includes Heart disease in her sister; Heart failure in her mother; Liver disease in her father.       Allergies:  Allergies   Allergen Reactions   • Phenergan [Promethazine Hcl]    • Promethazine Unknown - High Severity       Medications:  Prior to Admission medications    Medication Sig Start Date End Date Taking? Authorizing Provider   acetaminophen (TYLENOL) 650 MG 8 hr tablet Take 1 tablet by mouth Every 8 (Eight) Hours As Needed.    Jamin Lloyd MD   busPIRone (BUSPAR) 5 MG tablet Take 1 tablet by mouth 3 (Three) Times a Day As Needed (anxiety). 4/27/23   Urban Main MD   cefdinir (OMNICEF) 300 MG capsule Take 1 capsule by mouth 2 (Two) Times a Day for 5 days. 4/28/23 5/3/23  Christine Atkinson APRN   cholecalciferol (VITAMIN D3) 25 MCG (1000 UT) tablet Take 1 tablet by mouth Daily. 4/29/23   Bryan Torres MD   furosemide (LASIX) 20 MG tablet Take 1 tablet by mouth Daily As Needed (for wt gain of 2 lbs from the day before). 4/25/23   Austin Gordillo MD   ivabradine HCl (CORLANOR) 5 MG tablet tablet Take 1 tablet by mouth 2 (Two) Times a Day With Meals. 4/25/23   Austin Gordillo MD   metoprolol succinate XL (TOPROL-XL) 50 MG 24 hr tablet Take 1.5 tablets by mouth Daily.  Patient taking differently: Take 1 tablet by mouth Daily. 5/4/22   Austin Gordillo MD   oxyCODONE (ROXICODONE) 15 MG immediate release tablet Take 1 tablet by mouth 4 (Four) Times a Day As Needed.    Jamin Lloyd MD   oxyCODONE ER (oxyCONTIN) 15 MG tablet extended-release 12 hour Take 1 tablet by mouth Every 12 (Twelve) Hours.  Patient not taking: Reported on 5/3/2023    Jamin Lloyd MD   sacubitril-valsartan (ENTRESTO) 24-26 MG tablet Take 1 tablet by mouth 2 (Two) Times a Day. 4/25/23   Austin Gordillo MD   spironolactone  "(ALDACTONE) 25 MG tablet Take 1 tablet by mouth Daily. 2/23/22   Romeo Shaffer APRN     I have utilized all available immediate resources to obtain, update, or review the patient's current medications (including all prescriptions, over-the-counter products, herbals, cannabis/cannabidiol products, and vitamin/mineral/dietary (nutritional) supplements).    Objective     Vital Signs: /85 (BP Location: Left arm, Patient Position: Lying)   Pulse (!) 150   Temp 97.9 °F (36.6 °C) (Oral)   Resp 20   Ht 157.5 cm (62\")   Wt 48.4 kg (106 lb 9.6 oz)   LMP  (LMP Unknown)   SpO2 100%   BMI 19.50 kg/m²   Physical Exam  Vitals and nursing note reviewed.   Constitutional:       Appearance: Normal appearance. She is well-developed.   HENT:      Head: Normocephalic and atraumatic.      Right Ear: External ear normal.      Left Ear: External ear normal.      Nose: Nose normal.      Mouth/Throat:      Mouth: Mucous membranes are moist.   Eyes:      Extraocular Movements: Extraocular movements intact.      Conjunctiva/sclera: Conjunctivae normal.      Pupils: Pupils are equal, round, and reactive to light.   Neck:      Thyroid: No thyromegaly.      Vascular: No JVD.      Trachea: No tracheal deviation.   Cardiovascular:      Rate and Rhythm: Tachycardia present. Rhythm irregular.      Pulses: Normal pulses.      Heart sounds: Normal heart sounds. No murmur heard.    No friction rub. No gallop.   Pulmonary:      Effort: Pulmonary effort is normal.      Breath sounds: Normal breath sounds.   Abdominal:      General: Bowel sounds are normal. There is no distension.      Palpations: Abdomen is soft.      Tenderness: There is no abdominal tenderness.   Musculoskeletal:         General: Normal range of motion.      Cervical back: Normal range of motion and neck supple.   Lymphadenopathy:      Cervical: No cervical adenopathy.   Skin:     General: Skin is warm and dry.      Capillary Refill: Capillary refill takes less than 2 " seconds.   Neurological:      Mental Status: She is alert and oriented to person, place, and time.      Cranial Nerves: No cranial nerve deficit.      Coordination: Coordination normal.   Psychiatric:         Mood and Affect: Mood normal.         Behavior: Behavior normal.              Results Reviewed:  Lab Results (last 24 hours)     ** No results found for the last 24 hours. **        Imaging Results (Last 24 Hours)     ** No results found for the last 24 hours. **        I have personally reviewed and interpreted the radiology studies and ECG obtained at time of admission.     Assessment / Plan   Assessment:   Active Hospital Problems    Diagnosis    • **Atrial fibrillation with rapid ventricular response    • Anemia, unspecified type    • Primary hypertension    • Dilated cardiomyopathy (HCC)        Treatment Plan  The patient will be admitted to my service here at Psychiatric.   Cardizem drip  Stat lab cmp cbc tsh type and screen.  Cardiac monitor  Lab in AM  cmp cbc  Diet regular healthy heart  Activity Up ad karan      Medical Decision Making  Number and Complexity of problems:   afib rvr high complexity  Anemia high complexity  HTN moderate complexity  Dilated cardiomyopathy moderate complexity    Differential Diagnosis: atrial flutter    Conditions and Status        Condition is worsening.     [facility] Data  External documents reviewed: reviewed care everywhere  Cardiac tracing (EKG, telemetry) interpretation: Atrial fibrillation with RVR  Radiology interpretation: none  Labs reviewed: ordered not yet resulted  Any tests that were considered but not ordered: none     Decision rules/scores evaluated (example [id number], Wells, etc): none     Discussed with: patient     Care Planning  Shared decision making: patient  Code status and discussions: full    Disposition  Social Determinants of Health that impact treatment or disposition: none  Estimated length of stay is 2-5 days.     I confirmed that the  patient's advanced care plan is present, code status is documented, and a surrogate decision maker is listed in the patient's medical record.     The patient's surrogate decision maker is woodrow Will    The patient was seen and examined by me on 5/3/23 at 1730.    Electronically signed by Rani Garza, 05/03/23, 17:49 CDT.

## 2023-05-04 ENCOUNTER — READMISSION MANAGEMENT (OUTPATIENT)
Dept: CALL CENTER | Facility: HOSPITAL | Age: 78
End: 2023-05-04
Payer: COMMERCIAL

## 2023-05-04 ENCOUNTER — APPOINTMENT (OUTPATIENT)
Dept: CARDIOLOGY | Facility: HOSPITAL | Age: 78
End: 2023-05-04
Payer: MEDICARE

## 2023-05-04 VITALS
HEART RATE: 71 BPM | SYSTOLIC BLOOD PRESSURE: 111 MMHG | TEMPERATURE: 97.9 F | DIASTOLIC BLOOD PRESSURE: 60 MMHG | OXYGEN SATURATION: 94 % | RESPIRATION RATE: 18 BRPM | HEIGHT: 62 IN | BODY MASS INDEX: 19.62 KG/M2 | WEIGHT: 106.6 LBS

## 2023-05-04 DIAGNOSIS — C85.10 B-CELL LYMPHOMA, UNSPECIFIED B-CELL LYMPHOMA TYPE, UNSPECIFIED BODY REGION: ICD-10-CM

## 2023-05-04 DIAGNOSIS — D50.9 IRON DEFICIENCY ANEMIA, UNSPECIFIED IRON DEFICIENCY ANEMIA TYPE: ICD-10-CM

## 2023-05-04 DIAGNOSIS — I48.91 ATRIAL FIBRILLATION WITH RVR: Primary | ICD-10-CM

## 2023-05-04 LAB
BH CV ECHO MEAS - AO MAX PG: 9.1 MMHG
BH CV ECHO MEAS - AO MEAN PG: 4.3 MMHG
BH CV ECHO MEAS - AO ROOT DIAM: 2.2 CM
BH CV ECHO MEAS - AO V2 MAX: 151 CM/SEC
BH CV ECHO MEAS - AO V2 VTI: 28.6 CM
BH CV ECHO MEAS - AVA(I,D): 2.44 CM2
BH CV ECHO MEAS - EDV(CUBED): 97.3 ML
BH CV ECHO MEAS - EDV(MOD-SP2): 149 ML
BH CV ECHO MEAS - EDV(MOD-SP4): 160 ML
BH CV ECHO MEAS - EF(MOD-SP2): 35.3 %
BH CV ECHO MEAS - EF(MOD-SP4): 38.8 %
BH CV ECHO MEAS - ESV(CUBED): 74.1 ML
BH CV ECHO MEAS - ESV(MOD-SP2): 96.4 ML
BH CV ECHO MEAS - ESV(MOD-SP4): 98 ML
BH CV ECHO MEAS - FS: 8.7 %
BH CV ECHO MEAS - IVS/LVPW: 0.88 CM
BH CV ECHO MEAS - IVSD: 0.7 CM
BH CV ECHO MEAS - LA DIMENSION: 4.1 CM
BH CV ECHO MEAS - LAT PEAK E' VEL: 8.3 CM/SEC
BH CV ECHO MEAS - LV DIASTOLIC VOL/BSA (35-75): 109.6 CM2
BH CV ECHO MEAS - LV MASS(C)D: 108.5 GRAMS
BH CV ECHO MEAS - LV MAX PG: 6.6 MMHG
BH CV ECHO MEAS - LV MEAN PG: 4 MMHG
BH CV ECHO MEAS - LV SYSTOLIC VOL/BSA (12-30): 67.1 CM2
BH CV ECHO MEAS - LV V1 MAX: 128 CM/SEC
BH CV ECHO MEAS - LV V1 VTI: 24.6 CM
BH CV ECHO MEAS - LVIDD: 4.6 CM
BH CV ECHO MEAS - LVIDS: 4.2 CM
BH CV ECHO MEAS - LVOT AREA: 2.8 CM2
BH CV ECHO MEAS - LVOT DIAM: 1.9 CM
BH CV ECHO MEAS - LVPWD: 0.8 CM
BH CV ECHO MEAS - MED PEAK E' VEL: 4.7 CM/SEC
BH CV ECHO MEAS - MR MAX PG: 81.8 MMHG
BH CV ECHO MEAS - MR MAX VEL: 452 CM/SEC
BH CV ECHO MEAS - MR MEAN PG: 56 MMHG
BH CV ECHO MEAS - MR MEAN VEL: 363 CM/SEC
BH CV ECHO MEAS - MR VTI: 161.5 CM
BH CV ECHO MEAS - MV A MAX VEL: 53.1 CM/SEC
BH CV ECHO MEAS - MV DEC TIME: 0.13 MSEC
BH CV ECHO MEAS - MV E MAX VEL: 99.5 CM/SEC
BH CV ECHO MEAS - MV E/A: 1.87
BH CV ECHO MEAS - MV MAX PG: 6.9 MMHG
BH CV ECHO MEAS - MV MEAN PG: 2 MMHG
BH CV ECHO MEAS - MV V2 VTI: 22.9 CM
BH CV ECHO MEAS - MVA(VTI): 3 CM2
BH CV ECHO MEAS - PA V2 MAX: 80.1 CM/SEC
BH CV ECHO MEAS - PI END-D VEL: 157 CM/SEC
BH CV ECHO MEAS - RAP SYSTOLE: 5 MMHG
BH CV ECHO MEAS - RF(MV,LVOT)(1DIAM): 0.38 CM
BH CV ECHO MEAS - RVSP: 57.1 MMHG
BH CV ECHO MEAS - SI(MOD-SP2): 36 ML/M2
BH CV ECHO MEAS - SI(MOD-SP4): 42.5 ML/M2
BH CV ECHO MEAS - SV(LVOT): 69.7 ML
BH CV ECHO MEAS - SV(MOD-SP2): 52.6 ML
BH CV ECHO MEAS - SV(MOD-SP4): 62 ML
BH CV ECHO MEAS - TAPSE (>1.6): 1.69 CM
BH CV ECHO MEAS - TR MAX PG: 52.1 MMHG
BH CV ECHO MEAS - TR MAX VEL: 361 CM/SEC
BH CV ECHO MEASUREMENTS AVERAGE E/E' RATIO: 15.31
BH CV VAS BP LEFT ARM: NORMAL MMHG
BH CV XLRA - RV BASE: 4.1 CM
BH CV XLRA - RV LENGTH: 7.9 CM
BH CV XLRA - RV MID: 3.1 CM
BH CV XLRA - TDI S': 10.9 CM/SEC
LEFT ATRIUM VOLUME INDEX: 42.8 ML/M2
LEFT ATRIUM VOLUME: 69.4 ML
MAXIMAL PREDICTED HEART RATE: 143 BPM
QT INTERVAL: 352 MS
QTC INTERVAL: 474 MS
STRESS TARGET HR: 122 BPM

## 2023-05-04 PROCEDURE — 93306 TTE W/DOPPLER COMPLETE: CPT

## 2023-05-04 PROCEDURE — 93306 TTE W/DOPPLER COMPLETE: CPT | Performed by: INTERNAL MEDICINE

## 2023-05-04 PROCEDURE — 25510000001 PERFLUTREN 6.52 MG/ML SUSPENSION: Performed by: FAMILY MEDICINE

## 2023-05-04 RX ADMIN — PERFLUTREN 13.04 MG: 6.52 INJECTION, SUSPENSION INTRAVENOUS at 08:50

## 2023-05-04 RX ADMIN — OXYCODONE HYDROCHLORIDE 15 MG: 15 TABLET, FILM COATED, EXTENDED RELEASE ORAL at 08:54

## 2023-05-04 RX ADMIN — Medication 10 ML: at 08:55

## 2023-05-04 RX ADMIN — CEFDINIR 300 MG: 300 CAPSULE ORAL at 08:55

## 2023-05-04 RX ADMIN — OXYCODONE HYDROCHLORIDE 15 MG: 5 TABLET ORAL at 10:16

## 2023-05-04 RX ADMIN — SACUBITRIL AND VALSARTAN 1 TABLET: 24; 26 TABLET, FILM COATED ORAL at 08:55

## 2023-05-04 RX ADMIN — METOPROLOL SUCCINATE 75 MG: 50 TABLET, EXTENDED RELEASE ORAL at 08:55

## 2023-05-04 RX ADMIN — OXYCODONE HYDROCHLORIDE 15 MG: 5 TABLET ORAL at 05:31

## 2023-05-04 RX ADMIN — Medication 1000 UNITS: at 08:55

## 2023-05-04 RX ADMIN — OXYCODONE HYDROCHLORIDE 15 MG: 5 TABLET ORAL at 01:01

## 2023-05-04 RX ADMIN — SPIRONOLACTONE 25 MG: 25 TABLET ORAL at 08:55

## 2023-05-04 RX ADMIN — OXYCODONE HYDROCHLORIDE 15 MG: 5 TABLET ORAL at 14:50

## 2023-05-04 NOTE — PLAN OF CARE
Goal Outcome Evaluation:  Plan of Care Reviewed With: patient        Progress: improving  Outcome Evaluation: VSS. [id number] on tele.  No cardizem gtt administered during shift.  C/o severe chronic back pain throughout shift, given PRN Roxicodone.  Pt refused scheduled oxycontin; further pain management discussion needs to be had.  Safety maintained.

## 2023-05-04 NOTE — DISCHARGE SUMMARY
[facility] Medicine Services  DISCHARGE SUMMARY       Date of Admission: 5/3/2023  Date of Discharge:  5/4/2023  Primary Care Physician: Urban Main MD    Presenting Problem/History of Present Illness:  Atrial fibrillation with RVR    Final Discharge Diagnoses:  A-fib RVR  Anemia primary hypertension  Dilated cardiomyopathy      Consults: none    Procedures Performed:   Echocardiogram  Preliminary EF report 34 to 38%    Pertinent Test Results:   Imaging Results (Last 7 Days)     ** No results found for the last 168 hours. **        Lab Results (last 7 days)     Procedure Component Value Units Date/Time    TSH [[id number]]  (Normal) Collected: 05/03/23 1759    Specimen: Blood Updated: 05/03/23 1841     TSH 1.160 uIU/mL     Comprehensive Metabolic Panel [[id number]]  (Abnormal) Collected: 05/03/23 1759    Specimen: Blood Updated: 05/03/23 1835     Glucose 95 mg/dL      BUN 14 mg/dL      Creatinine 0.66 mg/dL      Sodium 140 mmol/L      Potassium 4.4 mmol/L      Chloride 106 mmol/L      CO2 25.0 mmol/L      Calcium 8.9 mg/dL      Total Protein 6.7 g/dL      Albumin 3.8 g/dL      ALT (SGPT) 7 U/L      AST (SGOT) 13 U/L      Alkaline Phosphatase 118 U/L      Total Bilirubin 0.4 mg/dL      Globulin 2.9 gm/dL      A/G Ratio 1.3 g/dL      BUN/Creatinine Ratio 21.2     Anion Gap 9.0 mmol/L      eGFR 90.5 mL/min/1.73     Narrative:      GFR Normal >60  Chronic Kidney Disease <60  Kidney Failure <15    The GFR formula is only valid for adults with stable renal function between ages 18 and 70.    CBC Auto Differential [[id number]]  (Abnormal) Collected: 05/03/23 1759    Specimen: Blood Updated: 05/03/23 1815     WBC 9.44 10*3/mm3      RBC 3.99 10*6/mm3      Hemoglobin 8.7 g/dL      Hematocrit 32.5 %      MCV 81.5 fL      MCH 21.8 pg      MCHC 26.8 g/dL      RDW 27.4 %      RDW-SD 69.3 fl      MPV 9.2 fL      Platelets 426 10*3/mm3      Neutrophil % 78.5 %      Lymphocyte % 11.8 %       "Monocyte % 7.1 %      Eosinophil % 1.7 %      Basophil % 0.4 %      Immature Grans % 0.5 %      Neutrophils, Absolute 7.41 10*3/mm3      Lymphocytes, Absolute 1.11 10*3/mm3      Monocytes, Absolute 0.67 10*3/mm3      Eosinophils, Absolute 0.16 10*3/mm3      Basophils, Absolute 0.04 10*3/mm3      Immature Grans, Absolute 0.05 10*3/mm3      nRBC 0.0 /100 WBC         Hospital Course:  The patient is a 77 y.o. female who presented to [facility] with atrial fibrillation with rapid ventricular response.  She was at her doctor's office.  When she walked into the office she developed a fast heart rate.  She was sent to the hospital.  She was admitted.  She converted back to sinus rhythm prior to the Cardizem drip being given she was monitored overnight.  Echocardiogram was obtained.  Patient is quite anxious to go home.  She has follow-up set up with cardiology as an outpatient.  She is hemodynamically stable..      Physical Exam on Discharge:  /60 (BP Location: Left arm, Patient Position: Lying)   Pulse 71   Temp 97.9 °F (36.6 °C) (Oral)   Resp 18   Ht 157.5 cm (62\")   Wt 48.4 kg (106 lb 9.6 oz)   LMP  (LMP Unknown)   SpO2 94%   BMI 19.50 kg/m²   Physical Exam  Vitals and nursing note reviewed.   Constitutional:       Appearance: Normal appearance. She is well-developed.   HENT:      Head: Normocephalic and atraumatic.      Right Ear: External ear normal.      Left Ear: External ear normal.      Nose: Nose normal.      Mouth/Throat:      Mouth: Mucous membranes are moist.   Eyes:      Extraocular Movements: Extraocular movements intact.      Conjunctiva/sclera: Conjunctivae normal.      Pupils: Pupils are equal, round, and reactive to light.   Neck:      Thyroid: No thyromegaly.      Vascular: No JVD.      Trachea: No tracheal deviation.   Cardiovascular:      Rate and Rhythm: Normal rate and regular rhythm.      Pulses: Normal pulses.      Heart sounds: Normal heart sounds. No murmur heard.    No " friction rub. No gallop.   Pulmonary:      Effort: Pulmonary effort is normal.      Breath sounds: Normal breath sounds.   Abdominal:      General: Bowel sounds are normal. There is no distension.      Palpations: Abdomen is soft.      Tenderness: There is no abdominal tenderness.   Musculoskeletal:         General: Normal range of motion.      Cervical back: Normal range of motion and neck supple.   Lymphadenopathy:      Cervical: No cervical adenopathy.   Skin:     General: Skin is warm and dry.      Capillary Refill: Capillary refill takes less than 2 seconds.   Neurological:      Mental Status: She is alert and oriented to person, place, and time.      Cranial Nerves: No cranial nerve deficit.      Coordination: Coordination normal.   Psychiatric:         Mood and Affect: Mood normal.         Behavior: Behavior normal.           Condition on Discharge:   Stable improved    Discharge Disposition:  Home or Self Care    Discharge Medications:     Discharge Medications      Changes to Medications      Instructions Start Date   metoprolol succinate XL 50 MG 24 hr tablet  Commonly known as: TOPROL-XL  What changed: how much to take   75 mg, Oral, Daily      oxyCODONE 15 MG immediate release tablet  Commonly known as: ROXICODONE  What changed: Another medication with the same name was removed. Continue taking this medication, and follow the directions you see here.   15 mg, Oral, 4 Times Daily PRN         Continue These Medications      Instructions Start Date   acetaminophen 650 MG 8 hr tablet  Commonly known as: TYLENOL   650 mg, Oral, Every 8 Hours PRN      busPIRone 5 MG tablet  Commonly known as: BUSPAR   5 mg, Oral, 3 Times Daily PRN      cholecalciferol 25 MCG (1000 UT) tablet  Commonly known as: VITAMIN D3   1,000 Units, Oral, Daily      furosemide 20 MG tablet  Commonly known as: LASIX   20 mg, Oral, Daily PRN      ivabradine HCl 5 MG tablet tablet  Commonly known as: CORLANOR   5 mg, Oral, 2 Times Daily With  Meals      sacubitril-valsartan 24-26 MG tablet  Commonly known as: ENTRESTO   1 tablet, Oral, 2 Times Daily      spironolactone 25 MG tablet  Commonly known as: ALDACTONE   25 mg, Oral, Daily         Stop These Medications    cefdinir 300 MG capsule  Commonly known as: OMNICEF          Discharge Diet:   Diet Instructions     Diet: Cardiac Diets; Healthy Heart (2-3 Na+); Regular Texture (IDDSI 7); Thin (IDDSI 0)      Discharge Diet: Cardiac Diets    Cardiac Diet: Healthy Heart (2-3 Na+)    Texture: Regular Texture (IDDSI 7)    Fluid Consistency: Thin (IDDSI 0)        Activity at Discharge:   Activity Instructions     Activity as Tolerated            Follow-up Appointments:   Future Appointments   Date Time Provider Department Center   7/24/2023  1:15 PM Austin Gordillo MD [facility] CD PAD PAD   10/30/2023 10:15 AM Urban Main MD [facility] PC VSQ PAD   2/27/2024  1:30 PM [facility] PAD DEVICE CHECK [facility] CD PAD PAD   Cardiology 1 week, patient sees Dr. Gordillo  Primary care provider Dr. Main 5 to 7 days    Test Results Pending at Discharge:   Formal echocardiogram report    Rani Castillo Greg  05/04/23  09:27 CDT    Time: 30 minutes.

## 2023-05-04 NOTE — OUTREACH NOTE
Prep Survey    Flowsheet Row Responses   [facility] facility patient discharged from? [place]   Is LACE score < 7 ? No   Eligibility [facility]   Date of Admission 05/03/23   Date of Discharge 05/04/23   Discharge Disposition Home or Self Care   Discharge diagnosis Atrial fibrillation with rapid ventricular response   Does the patient have one of the following disease processes/diagnoses(primary or secondary)? Other   Does the patient have Home health ordered? No   Is there a DME ordered? No   Prep survey completed? Yes          [facility] - Registered Nurse

## 2023-05-05 ENCOUNTER — TRANSITIONAL CARE MANAGEMENT TELEPHONE ENCOUNTER (OUTPATIENT)
Dept: CALL CENTER | Facility: HOSPITAL | Age: 78
End: 2023-05-05
Payer: COMMERCIAL

## 2023-05-05 NOTE — OUTREACH NOTE
Call Center TCM Note    Flowsheet Row Responses   [facility] patient discharged from? [place]   Does the patient have one of the following disease processes/diagnoses(primary or secondary)? Other   TCM attempt successful? No   Unsuccessful attempts Attempt 2          Jackie Wise LPN    5/5/2023, 14:24 CDT

## 2023-05-05 NOTE — OUTREACH NOTE
Call Center TCM Note    Flowsheet Row Responses   [facility] patient discharged from? [place]   Does the patient have one of the following disease processes/diagnoses(primary or secondary)? Other   TCM attempt successful? No   Unsuccessful attempts Attempt 1          Jackie Wise LPN    5/5/2023, 13:25 CDT

## 2023-05-06 ENCOUNTER — TRANSITIONAL CARE MANAGEMENT TELEPHONE ENCOUNTER (OUTPATIENT)
Dept: CALL CENTER | Facility: HOSPITAL | Age: 78
End: 2023-05-06
Payer: COMMERCIAL

## 2023-05-06 NOTE — OUTREACH NOTE
Call Center TCM Note    Flowsheet Row Responses   [facility] patient discharged from? [place]   Does the patient have one of the following disease processes/diagnoses(primary or secondary)? Other   TCM attempt successful? Yes   Call start time 1256   Call end time 1303   Meds reviewed with patient/caregiver? Yes   Is the patient having any side effects they believe may be caused by any medication additions or changes? No   Does the patient have all medications ordered at discharge? Yes   Is the patient taking all medications as directed (includes completed medication regime)? Yes   Comments Hospital f/u appt with CARMEN Vick, scheduled for 05/11/23 which is within TCM time frame. Cardiology appt also on 05/11/23.   Does the patient have an appointment with their PCP within 7 days of discharge? Yes   Has home health visited the patient within 72 hours of discharge? N/A   Psychosocial issues? No   Did the patient receive a copy of their discharge instructions? Yes   Nursing interventions Reviewed instructions with patient   What is the patient's perception of their health status since discharge? Improving   Is the patient/caregiver able to teach back signs and symptoms related to disease process for when to call PCP? Yes   Is the patient/caregiver able to teach back signs and symptoms related to disease process for when to call 911? Yes   Is the patient/caregiver able to teach back the hierarchy of who to call/visit for symptoms/problems? PCP, Specialist, Home health nurse, Urgent Care, ED, 911 Yes   If the patient is a current smoker, are they able to teach back resources for cessation? Not a smoker   TCM call completed? Yes   Wrap up additional comments Patient states is slightly improved. Denies any cardiac s/s. Denies any s/s of GI bleeding. Denies any needs/concerns today. TCM complete.   Call end time 1303   Would this patient benefit from a Referral to [facility] Social Work? No   Is the patient  interested in additional calls from an ambulatory ?  NOTE:  applies to high risk patients requiring additional follow-up. Annel Khan RN    5/6/2023, 13:04 CDT

## 2023-05-11 ENCOUNTER — OFFICE VISIT (OUTPATIENT)
Dept: CARDIOLOGY | Facility: CLINIC | Age: 78
End: 2023-05-11
Payer: MEDICARE

## 2023-05-11 ENCOUNTER — OFFICE VISIT (OUTPATIENT)
Dept: INTERNAL MEDICINE | Facility: CLINIC | Age: 78
End: 2023-05-11
Payer: MEDICARE

## 2023-05-11 VITALS
SYSTOLIC BLOOD PRESSURE: 124 MMHG | OXYGEN SATURATION: 98 % | BODY MASS INDEX: 18.95 KG/M2 | HEIGHT: 62 IN | HEART RATE: 72 BPM | RESPIRATION RATE: 18 BRPM | WEIGHT: 103 LBS | DIASTOLIC BLOOD PRESSURE: 70 MMHG

## 2023-05-11 VITALS
TEMPERATURE: 97.1 F | WEIGHT: 102 LBS | OXYGEN SATURATION: 99 % | BODY MASS INDEX: 18.77 KG/M2 | HEART RATE: 93 BPM | SYSTOLIC BLOOD PRESSURE: 132 MMHG | HEIGHT: 62 IN | DIASTOLIC BLOOD PRESSURE: 84 MMHG

## 2023-05-11 DIAGNOSIS — D50.9 IRON DEFICIENCY ANEMIA, UNSPECIFIED IRON DEFICIENCY ANEMIA TYPE: Primary | ICD-10-CM

## 2023-05-11 DIAGNOSIS — Z85.72 HISTORY OF LYMPHOMA: ICD-10-CM

## 2023-05-11 DIAGNOSIS — Z95.810 AICD (AUTOMATIC CARDIOVERTER/DEFIBRILLATOR) PRESENT: ICD-10-CM

## 2023-05-11 DIAGNOSIS — E78.2 MIXED HYPERLIPIDEMIA: ICD-10-CM

## 2023-05-11 DIAGNOSIS — I10 PRIMARY HYPERTENSION: ICD-10-CM

## 2023-05-11 DIAGNOSIS — I48.91 ATRIAL FIBRILLATION WITH RAPID VENTRICULAR RESPONSE: ICD-10-CM

## 2023-05-11 DIAGNOSIS — I50.22 CHRONIC HFREF (HEART FAILURE WITH REDUCED EJECTION FRACTION): ICD-10-CM

## 2023-05-11 DIAGNOSIS — D64.9 ANEMIA, UNSPECIFIED TYPE: Primary | ICD-10-CM

## 2023-05-11 DIAGNOSIS — I50.22 CHRONIC SYSTOLIC CONGESTIVE HEART FAILURE: ICD-10-CM

## 2023-05-11 DIAGNOSIS — I47.1 PAROXYSMAL SVT (SUPRAVENTRICULAR TACHYCARDIA): ICD-10-CM

## 2023-05-11 DIAGNOSIS — I48.91 ATRIAL FIBRILLATION WITH RVR: ICD-10-CM

## 2023-05-11 NOTE — PROGRESS NOTES
Transitional Care Follow Up Visit    Mitzi Villafuerte is a 77 y.o. female who presents for a transitional care management visit.    Patient was admitted at [facility]    Admission Date: 5/3/2023    Discharge Date: 5/4/2023    Within 48 business hours after discharge [facility] Nurse Line contacted her via telephone to coordinate her care and needs.      I reviewed and discussed the details of that call along with the discharge summary, hospital problems, inpatient lab results, inpatient diagnostic studies, and consultation reports with Mitzi.     Current outpatient and discharge medications have been reconciled for the patient.  Reviewed by: Darlene Osborn, CARMEN          5/4/2023     6:55 PM   Date of TCM Phone Call   [facility]   Date of Admission 5/3/2023   Date of Discharge 5/4/2023   Discharge Disposition Home or Self Care       Course During Hospital Stay:    The patient was actually seen in our office on 5/3/2023 for a transitional care management visit for a previous hospitalization in April in which she had been directed to the emergency department for evaluation of abnormal labs indicating severe anemia with hemoglobin of 4.5.  Patient had noted fatigue, shortness of breath, and weakness.  During this follow-up office visit on 5/3/2023 she was found to have a heart rate of 160 and was noted to be irregular on palpation.  EKG x2 revealed atrial fibrillation with rapid ventricular response.  The patient was given an extra dose of her Xanax which she uses for anxiety which did not help her symptoms.  She was sent for admission to the hospital.  Prior to Cardizem drip being given she did convert spontaneously back to normal sinus rhythm.  She was monitored overnight and remained in normal sinus rhythm.  She did have an echocardiogram obtained.  Given conversion to normal sinus rhythm and hemodynamic stability she was discharged home on 5/4.    HPI:  Patient presents for a  follow-up visit today with her daughter-in-law present.  She states she is feeling much better overall compared to when she went to the hospital.  Since being discharged, she has had no difficulty with chest pain or shortness of breath.  She denies palpitations.  She denies any dizziness or lightheadedness.  Blood pressure is 132/84, and patient states her blood pressure is always typically higher in the doctor's office.  She does monitor this at home.  She has had 1 previous instance of atrial fibrillation prior to recently that she is aware of.  She is scheduled to meet with electrophysiology in July, scheduled for cardiology follow-up later this afternoon.    The patient's anemia was found to be stable upon discharge from the hospital.  She does have a history of lymphoma and was followed by Dr. Govea many years ago, has not followed with oncology since 2015.  She reports having had a Cologuard test several years ago which she believes was normal.  She currently denies any black/tarry or bloody stools.  She has noted no blood in her urine.  She denies seeing any blood elsewhere.  She does use Aleve several times per week, not usually multiple times per day.  She does not take aspirin.     The following portions of the patient's history were reviewed and updated as appropriate: allergies, current medications, past family history, past medical history, past social history, past surgical history and problem list.    Objective     Past Medical History:   Diagnosis Date    Broken femur     Chronic combined systolic (congestive) and diastolic (congestive) heart failure     Fracture of hip     left, due to lymphoma, treated non-operatively    Hypertension     Lymphoma     Presence of automatic cardioverter/defibrillator (AICD)       Past Surgical History:   Procedure Laterality Date    CARDIAC ELECTROPHYSIOLOGY PROCEDURE N/A 3/2/2022    Procedure: ICD DC new;  Surgeon: Austin Gordillo MD;  Location: [facility]  CATH INVASIVE LOCATION;  Service: Cardiology;  Laterality: N/A;    CHOLECYSTECTOMY      HERNIA REPAIR      HIP TROCHANTERIC NAILING WITH INTRAMEDULLARY HIP SCREW Left 2/2/2017    Procedure: HIP TROCANTERIC NAILING LONG WITH INTRAMEDULLARY HIP SCREW WITH CAST APPLICATION TO LEFT HAND;  Surgeon: Beni Culp MD;  Location: [facility] OR;  Service:         Current Outpatient Medications:     acetaminophen (TYLENOL) 650 MG 8 hr tablet, Take 1 tablet by mouth Every 8 (Eight) Hours As Needed for Mild Pain., Disp: , Rfl:     busPIRone (BUSPAR) 5 MG tablet, Take 1 tablet by mouth 3 (Three) Times a Day As Needed (anxiety)., Disp: 90 tablet, Rfl: 0    cholecalciferol (VITAMIN D3) 25 MCG (1000 UT) tablet, Take 1 tablet by mouth Daily., Disp: 30 tablet, Rfl: 2    furosemide (LASIX) 20 MG tablet, Take 1 tablet by mouth Daily As Needed (for wt gain of 2 lbs from the day before)., Disp: 15 tablet, Rfl: 3    metoprolol succinate XL (TOPROL-XL) 50 MG 24 hr tablet, Take 1.5 tablets by mouth Daily., Disp: 30 tablet, Rfl: 11    oxyCODONE (ROXICODONE) 15 MG immediate release tablet, Take 1 tablet by mouth 4 (Four) Times a Day As Needed for Moderate Pain., Disp: , Rfl:     sacubitril-valsartan (ENTRESTO) 24-26 MG tablet, Take 1 tablet by mouth 2 (Two) Times a Day., Disp: 60 tablet, Rfl: 11    spironolactone (ALDACTONE) 25 MG tablet, Take 1 tablet by mouth Daily., Disp: 30 tablet, Rfl: 11    ivabradine HCl (CORLANOR) 5 MG tablet tablet, Take 1 tablet by mouth 2 (Two) Times a Day With Meals. (Patient not taking: Reported on 5/11/2023), Disp: 60 tablet, Rfl: 11     Vitals:    05/11/23 1122   BP: 132/84   Pulse: 93   Temp: 97.1 °F (36.2 °C)   SpO2: 99%         05/11/23  1122   Weight: 46.3 kg (102 lb)     BMI is within normal parameters. No other follow-up for BMI required.      Physical Exam  Vitals and nursing note reviewed.   Constitutional:       General: She is not in acute distress.     Appearance: She is not ill-appearing or  toxic-appearing.   HENT:      Head: Normocephalic and atraumatic.      Mouth/Throat:      Mouth: Mucous membranes are moist.      Pharynx: Oropharynx is clear.   Cardiovascular:      Rate and Rhythm: Normal rate and regular rhythm.      Pulses: Normal pulses.      Heart sounds: Murmur heard.   Pulmonary:      Effort: Pulmonary effort is normal.      Breath sounds: No wheezing, rhonchi or rales.   Abdominal:      General: Bowel sounds are normal. There is no distension.      Palpations: Abdomen is soft.      Tenderness: There is no abdominal tenderness.   Musculoskeletal:         General: No swelling or tenderness. Normal range of motion.      Cervical back: Normal range of motion and neck supple. No tenderness.   Skin:     General: Skin is warm and dry.      Coloration: Skin is pale.      Findings: No erythema or rash.   Neurological:      General: No focal deficit present.      Mental Status: She is alert and oriented to person, place, and time.   Psychiatric:         Mood and Affect: Mood normal.         Behavior: Behavior normal.         Thought Content: Thought content normal.         Judgment: Judgment normal.     Assessment & Plan   Diagnoses and all orders for this visit:    1. Iron deficiency anemia, unspecified iron deficiency anemia type (Primary)  -     CBC & Differential    2. Chronic systolic congestive heart failure    3. Atrial fibrillation with rapid ventricular response    4. History of lymphoma       Patient presents to the office today to follow-up on recent hospitalization from 5/3 through 5/4.  The patient had been seen in our office on 5/3 and was noted to have a heart rate of 160 upon initial evaluation.  EKG x2 confirmed atrial fibrillation with rapid ventricular response.  Before being treated with IV medication at the hospital, she converted spontaneously to normal sinus rhythm.  Rhythm sounds regular on auscultation today with heart rate of 93.  She states she has been feeling much better  since being discharged from the hospital.  She denies chest pain, shortness of breath, palpitations, dizziness/lightheadedness.  She will be following up with cardiology today and is scheduled to meet with electrophysiology in July.  During her last office visit with Dr. Gordillo in April he had added Corlanor to her regimen which I believe she is not taking.  Continue Toprol-XL.  Patient also with history of systolic heart failure with ejection fraction as low as 21 to 25% in 2022, status post AICD.  Most recent echocardiogram during hospitalization shows improvement of ejection fraction to 31 to 35%.  Patient appears euvolemic on exam today.  She is taking Lasix on an as-needed basis.    The patient was recently hospitalized for profound anemia with hemoglobin as low as 4.5.  She received 2 units of packed red blood cells during this hospitalization and her hemoglobin has trended upward and stabilized.  Her hemoglobin at time of discharge from the hospital was 8.7.  We will recheck a CBC today.  The patient also received 2 IV iron infusions during hospitalization, so would plan to recheck her iron panel at her next office visit in 2 months.  We did discuss use of an iron supplement containing vitamin C for better absorption, and she will obtain this over-the-counter.  Patient was provided with occult blood stool cards to return to the office.  She has completed Cologuard testing several years ago but no recent GI evaluation.  If occult blood stool is found to be negative, will plan to refer to hematology for further evaluation given her history of lymphoma.

## 2023-05-11 NOTE — PROGRESS NOTES
Subjective:     Encounter Date:05/11/2023      Patient ID: Mitzi Villafuerte is a 77 y.o. female     Chief Complaint:  History of Present Illness  Patient presents today for cardiology follow up after recent admissions. Patient was admitted 4/28-4/29 for abnormal outpatient labs with a finding of anemia with a hemoglobin of 4.2. She was given 2 units of packed red blood cells. She was euvolemic but was given IV diuretics between units. She was seen in her PCP office in follow up and complained of racing heart rate. She was found to be in A-fib RVR and was sent to the ER. She was admitted 5/3/23 -5/4/23. She spontaneously converted to NSR. She had her Metoprolol increased and was discharged home. She had an echo which showed an LVEF 31-35%, mild AI, mild MR and mild TR. Last echo in February 2022 Showed LVEF 21-25%, severe pulmonary hypertension, dilated CMO. She had an AICD placed 3/2/22. Patient was last seen by Dr. Gordillo 4/25/23. At that time Losartan was switched to Entresto and she was started on Corlanor. Patient did have SVT that was treated with an inappropriate AICD shock 2/14. Patient has Hypertension, Hyperlipidemia, chronic systolic congestive heart failure, paroxysmal SVT.     Of note patient was treated for lymphoma in 2012 with chemo and her LVEF dropped from 55 to 25% after treatments. However in 2013 her LVEF had normalized to 60%. In November 2021 she was hospitalized with acute systolic congestive heart failure, pleural effusion treated with thoracentesis and tachycardia. Of note she had COVID in August 2021 and felt like this was when her symptoms had started. She had her medicines optimized. 90 day follow up echo showed persistent low LVEF. She had an AICD placed in March of 2022.    She presents today. Overall feeling good. She notes she never started Corlanor due to cost. She is on Entresto and tolerating well. BMP stable. Blood Pressure stable. Has not needed any PRN Lasix. She notes overall  she is feeling better. No further heart racing. She was referred to Dr. Kingston by Dr. Sinclair and has an appointment in July to establish with him.     The following portions of the patient's history were reviewed and updated as appropriate: allergies, current medications, past medical history, past social history, past and problem list.    Allergies   Allergen Reactions   • Phenergan [Promethazine Hcl]    • Promethazine Unknown - High Severity       Current Outpatient Medications:   •  acetaminophen (TYLENOL) 650 MG 8 hr tablet, Take 1 tablet by mouth Every 8 (Eight) Hours As Needed for Mild Pain., Disp: , Rfl:   •  busPIRone (BUSPAR) 5 MG tablet, Take 1 tablet by mouth 3 (Three) Times a Day As Needed (anxiety)., Disp: 90 tablet, Rfl: 0  •  cholecalciferol (VITAMIN D3) 25 MCG (1000 UT) tablet, Take 1 tablet by mouth Daily., Disp: 30 tablet, Rfl: 2  •  furosemide (LASIX) 20 MG tablet, Take 1 tablet by mouth Daily As Needed (for wt gain of 2 lbs from the day before)., Disp: 15 tablet, Rfl: 3  •  metoprolol succinate XL (TOPROL-XL) 50 MG 24 hr tablet, Take 1.5 tablets by mouth Daily., Disp: 30 tablet, Rfl: 11  •  oxyCODONE (ROXICODONE) 15 MG immediate release tablet, Take 1 tablet by mouth 4 (Four) Times a Day As Needed for Moderate Pain., Disp: , Rfl:   •  sacubitril-valsartan (ENTRESTO) 24-26 MG tablet, Take 1 tablet by mouth 2 (Two) Times a Day., Disp: 60 tablet, Rfl: 11  •  spironolactone (ALDACTONE) 25 MG tablet, Take 1 tablet by mouth Daily., Disp: 30 tablet, Rfl: 11  •  ivabradine HCl (CORLANOR) 5 MG tablet tablet, Take 1 tablet by mouth 2 (Two) Times a Day With Meals. (Patient not taking: Reported on 5/11/2023), Disp: 60 tablet, Rfl: 11    Social History     Socioeconomic History   • Marital status: Single   Tobacco Use   • Smoking status: Former   • Smokeless tobacco: Never   • Tobacco comments:     quit 55 years ago   Vaping Use   • Vaping Use: Never used   Substance and Sexual Activity   • Alcohol use: No    • Drug use: No   • Sexual activity: Not Currently       Review of Systems   Constitutional: Positive for malaise/fatigue. Negative for chills, decreased appetite, fever, weight gain and weight loss.   HENT: Negative for nosebleeds.    Eyes: Negative for visual disturbance.   Cardiovascular: Negative for chest pain, dyspnea on exertion, leg swelling, near-syncope, orthopnea, palpitations, paroxysmal nocturnal dyspnea and syncope.   Respiratory: Positive for shortness of breath. Negative for cough, hemoptysis and snoring.    Endocrine: Negative for cold intolerance and heat intolerance.   Hematologic/Lymphatic: Negative for bleeding problem. Does not bruise/bleed easily.   Skin: Negative for rash.   Musculoskeletal: Negative for back pain and falls.   Gastrointestinal: Negative for abdominal pain, constipation, diarrhea, heartburn, melena, nausea and vomiting.   Genitourinary: Negative for hematuria.   Neurological: Negative for dizziness, headaches and light-headedness.   Psychiatric/Behavioral: Negative for altered mental status.   Allergic/Immunologic: Negative for persistent infections.              Objective:     Constitutional:       Appearance: Healthy appearance. Not in distress.   Eyes:      Pupils: Pupils are equal, round, and reactive to light.   HENT:      Head: Normocephalic and atraumatic.      Nose: Nose normal.    Mouth/Throat:      Dentition: Normal.   Pulmonary:      Effort: Pulmonary effort is normal.      Breath sounds: Normal breath sounds.   Chest:      Chest wall: Not tender to palpatation.   Cardiovascular:      PMI at left midclavicular line. Normal rate. Regular rhythm.      Murmurs: There is no murmur.   Pulses:     Intact distal pulses.   Edema:     Peripheral edema absent.   Abdominal:      General: Bowel sounds are normal.      Palpations: Abdomen is soft.   Musculoskeletal: Normal range of motion.      Cervical back: Normal range of motion. Skin:     General: Skin is warm and dry.       "Coloration: Skin is pale.   Neurological:      Mental Status: Alert, oriented to person, place, and time and oriented to person, place and time.   Psychiatric:         Attention and Perception: Attention normal.         Mood and Affect: Mood normal.             ECG 12 Lead    Date/Time: 5/11/2023 1:49 PM  Performed by: Romeo Shaffer APRN  Authorized by: Romeo Shaffer APRN   Comparison: compared with previous ECG from 11/22/2021  Similar to previous ECG  Comparison to previous ECG: Heart Rate has improved   Rhythm: sinus rhythm          /70 (BP Location: Right arm, Patient Position: Sitting)   Pulse 72   Resp 18   Ht 157.5 cm (62\")   Wt 46.7 kg (103 lb)   LMP  (LMP Unknown)   SpO2 98%   BMI 18.84 kg/m²   Lab Review:   I have reviewed   To recent Admissions to the ER.       Lab Results   Component Value Date    CHOL 118 11/25/2021    CHLPL 119 04/27/2023    TRIG 55 04/27/2023    HDL 43 04/27/2023    LDL 64 04/27/2023      Results for orders placed during the hospital encounter of 05/03/23    Adult Transthoracic Echo Complete W/ Cont if Necessary Per Protocol    Interpretation Summary  •  Left ventricular systolic function is severely decreased. Left ventricular ejection fraction appears to be 31 - 35%.  •  The left ventricular cavity is mildly dilated.  •  Normal right ventricular cavity size and systolic function noted.  •  Mild aortic valve regurgitation is present.  •  Mild mitral valve regurgitation is present.  •  Mild tricuspid valve regurgitation is present. Estimated right ventricular systolic pressure from tricuspid regurgitation is markedly elevated (>55 mmHg).     Echo February 15, 2022          Assessment:          Diagnosis Plan   1. Anemia, unspecified type        2. Chronic HFrEF (heart failure with reduced ejection fraction) (HCC)        3. AICD (automatic cardioverter/defibrillator) present        4. Paroxysmal SVT (supraventricular tachycardia)        5. Primary hypertension      "   6. Mixed hyperlipidemia        7. Atrial fibrillation with RVR               Plan:       1. Anemia- recent admission requiring transfusion. Work up ongoing by PCP. No clear cause. Had repeat CBC drawn today.    2. Chronic Systolic Congestive Heart Failure - appears euvolemic today. On PRN Lasix and has only needed one dose in last 90 days. Tolerating Entresto. Did not start Corlanor due to cost. Heart rate much improved today- will hold off for now. On Toprol and Aldactone. Start Jardiance. Low Salt Diet AICD in place.   3. AICD - placed in March of 2022. Did have an AICD shock for SVT earlier this year   4. Paroxysmal SVT - had an AICD shock for SVT earlier this year. On Toprol. Has been referred to Dr. Kingston by her PCP  5. Hypertension - blood pressure stable.   6. Mixed Hyperlipidemia - Lipids managed by PCP with good control  7. A-fib RVR - new diagnosis at PCP office. Notes she had a panic attack. She has not had any other episodes. Will monitor. [id number]. Not started on anticoagulation due to brief and acute anemia with hemoglobin of 4 requiring transfusion. Monitor for now      follow up in 2-3 weeks. Start Jardiance. BMP in 1-3 weeks after starting Jardiance.     I spent 45 minutes caring for Mitzi on this date of service. This time includes time spent by me in the following activities:preparing for the visit, reviewing tests, obtaining and/or reviewing a separately obtained history, performing a medically appropriate examination and/or evaluation , counseling and educating the patient/family/caregiver, ordering medications, tests, or procedures, referring and communicating with other health care professionals , documenting information in the medical record, independently interpreting results and communicating that information with the patient/family/caregiver and care coordination

## 2023-05-12 LAB
BASOPHILS # BLD AUTO: ABNORMAL 10*3/UL
DIFFERENTIAL COMMENT: ABNORMAL
EOSINOPHIL # BLD AUTO: ABNORMAL 10*3/UL
EOSINOPHIL NFR BLD AUTO: ABNORMAL %
HCT VFR BLD AUTO: 34.5 % (ref 34–46.6)
HGB BLD-MCNC: 10.2 G/DL (ref 12–15.9)
LYMPHOCYTES # BLD AUTO: ABNORMAL 10*3/UL
LYMPHOCYTES # BLD MANUAL: 1.85 10*3/MM3 (ref 0.7–3.1)
LYMPHOCYTES NFR BLD AUTO: ABNORMAL %
LYMPHOCYTES NFR BLD MANUAL: 22.4 % (ref 19.6–45.3)
MCH RBC QN AUTO: 24.4 PG (ref 26.6–33)
MCHC RBC AUTO-ENTMCNC: 29.6 G/DL (ref 31.5–35.7)
MCV RBC AUTO: 82.5 FL (ref 79–97)
MONOCYTES # BLD MANUAL: 1.01 10*3/MM3 (ref 0.1–0.9)
MONOCYTES NFR BLD AUTO: ABNORMAL %
MONOCYTES NFR BLD MANUAL: 12.2 % (ref 5–12)
NEUTROPHILS # BLD MANUAL: 5.39 10*3/MM3 (ref 1.7–7)
NEUTROPHILS NFR BLD AUTO: ABNORMAL %
NEUTROPHILS NFR BLD MANUAL: 65.3 % (ref 42.7–76)
PLATELET # BLD AUTO: 402 10*3/MM3 (ref 140–450)
PLATELET BLD QL SMEAR: ABNORMAL
RBC # BLD AUTO: 4.18 10*6/MM3 (ref 3.77–5.28)
RBC MORPH BLD: ABNORMAL
WBC # BLD AUTO: 8.26 10*3/MM3 (ref 3.4–10.8)

## 2023-05-17 NOTE — PROGRESS NOTES
Enter Query Response Below      Query Response: unable to determine Electronically signed by Rani Garza, 23, 8:01 AM CDT.               If applicable, please update the problem list.     Patient: Mitzi Villafuerte        : 1945  Account: [id number]           Admit Date: 2023        How to Respond to this query:       a. Click New Note     b. Answer query within the yellow box.                c. Update the Problem List, if applicable.      If you have any questions about this query contact me at: [email or web address]     Dr. Garza:    Patient admitted with atrial fibrillation with RVR on 5/3.  Medical history includes chronic combined systolic and diastolic CHF, dilated cardiomyopathy, and hypertension.  Patient spontaneously converted to sinus rhythm.  Monitored overnight.  Toprol XL, Losartan, and Entresto continued.      Can you clarify the suspected etiology of the patient's development of chronic CHF as-    Due to hypertension  Due to dilated cardiomyopathy  Due to atrial fibrillation  Due to all or combination of above (please specify)_________  Other ________  Unable to determine     By submitting this query, we are merely seeking further clarification of documentation to accurately reflect all conditions that you are monitoring, evaluating, treating or that extend the hospitalization or utilize additional resources of care. Please utilize your independent clinical judgment when addressing the question(s) above.     This query and your response, once completed, will be entered into the legal medical record.    Sincerely,  Dafne Mays RN, CCDS  Clinical Documentation Integrity Program

## 2023-05-24 ENCOUNTER — CLINICAL SUPPORT (OUTPATIENT)
Dept: INTERNAL MEDICINE | Facility: CLINIC | Age: 78
End: 2023-05-24
Payer: MEDICARE

## 2023-05-24 DIAGNOSIS — D50.9 IRON DEFICIENCY ANEMIA, UNSPECIFIED IRON DEFICIENCY ANEMIA TYPE: Primary | ICD-10-CM

## 2023-05-25 LAB
DEVELOPER EXPIRATION DATE: ABNORMAL
DEVELOPER LOT NUMBER: ABNORMAL
EXPIRATION DATE: ABNORMAL
FECAL OCCULT BLOOD SCREEN, POC: POSITIVE
Lab: ABNORMAL
NEGATIVE CONTROL: NEGATIVE
POSITIVE CONTROL: POSITIVE

## 2023-05-26 NOTE — PROGRESS NOTES
Since results were questionable, can we have patient submit 2 more cards? I didn't know if they could take all 3 cards home at once and just bring them in as they complete them or exactly how that works so forgive my ignorance! Now I will know for next time.

## 2023-06-01 ENCOUNTER — LAB (OUTPATIENT)
Dept: LAB | Facility: HOSPITAL | Age: 78
End: 2023-06-01
Payer: MEDICARE

## 2023-06-01 ENCOUNTER — OFFICE VISIT (OUTPATIENT)
Dept: CARDIOLOGY | Facility: CLINIC | Age: 78
End: 2023-06-01

## 2023-06-01 VITALS
WEIGHT: 99.6 LBS | HEART RATE: 64 BPM | DIASTOLIC BLOOD PRESSURE: 61 MMHG | SYSTOLIC BLOOD PRESSURE: 110 MMHG | HEIGHT: 62 IN | BODY MASS INDEX: 18.33 KG/M2

## 2023-06-01 DIAGNOSIS — E78.2 MIXED HYPERLIPIDEMIA: ICD-10-CM

## 2023-06-01 DIAGNOSIS — I48.0 PAROXYSMAL ATRIAL FIBRILLATION: ICD-10-CM

## 2023-06-01 DIAGNOSIS — I50.22 CHRONIC HFREF (HEART FAILURE WITH REDUCED EJECTION FRACTION): ICD-10-CM

## 2023-06-01 DIAGNOSIS — I10 PRIMARY HYPERTENSION: ICD-10-CM

## 2023-06-01 DIAGNOSIS — D64.9 ANEMIA, UNSPECIFIED TYPE: ICD-10-CM

## 2023-06-01 DIAGNOSIS — I50.22 CHRONIC SYSTOLIC CONGESTIVE HEART FAILURE: Primary | ICD-10-CM

## 2023-06-01 DIAGNOSIS — I47.1 PAROXYSMAL SVT (SUPRAVENTRICULAR TACHYCARDIA): ICD-10-CM

## 2023-06-01 DIAGNOSIS — Z95.810 AICD (AUTOMATIC CARDIOVERTER/DEFIBRILLATOR) PRESENT: ICD-10-CM

## 2023-06-01 LAB
ANION GAP SERPL CALCULATED.3IONS-SCNC: 8 MMOL/L (ref 5–15)
BUN SERPL-MCNC: 18 MG/DL (ref 8–23)
BUN/CREAT SERPL: 29.5 (ref 7–25)
CALCIUM SPEC-SCNC: 9.1 MG/DL (ref 8.6–10.5)
CHLORIDE SERPL-SCNC: 101 MMOL/L (ref 98–107)
CO2 SERPL-SCNC: 28 MMOL/L (ref 22–29)
CREAT SERPL-MCNC: 0.61 MG/DL (ref 0.57–1)
EGFRCR SERPLBLD CKD-EPI 2021: 92.2 ML/MIN/1.73
GLUCOSE SERPL-MCNC: 99 MG/DL (ref 65–99)
POTASSIUM SERPL-SCNC: 4.3 MMOL/L (ref 3.5–5.2)
SODIUM SERPL-SCNC: 137 MMOL/L (ref 136–145)

## 2023-06-01 PROCEDURE — 36415 COLL VENOUS BLD VENIPUNCTURE: CPT

## 2023-06-01 PROCEDURE — 80048 BASIC METABOLIC PNL TOTAL CA: CPT

## 2023-06-01 RX ORDER — SPIRONOLACTONE 25 MG/1
25 TABLET ORAL DAILY
Qty: 90 TABLET | Refills: 3 | Status: SHIPPED | OUTPATIENT
Start: 2023-06-01

## 2023-06-01 NOTE — PROGRESS NOTES
Subjective:     Encounter Date:06/01/2023      Patient ID: Mitzi Villafuerte is a 77 y.o. female     Chief Complaint:  History of Present Illness  Patient presents today for routine cardiology follow up. Patient is followed for SVT, systolic congestive heart failure, AICD, Hypertension, Hyperlipidemia. Of note patient was treated for lymphoma in 2012 with chemo and her LVEF dropped from 55 to 25% after treatments. However in 2013 her LVEF had normalized to 60%. In November 2021 she was hospitalized with acute systolic congestive heart failure, pleural effusion treated with thoracentesis and tachycardia. Of note she had COVID in August 2021 and felt like this was when her symptoms had started. She had her medicines optimized. 90 day follow up echo showed persistent low LVEF. She had an AICD placed in March of 2022. February 14th patient received multiple shocks for sustained SVT with rates 175-178. She notes she had a very bad day and was emotionally upset when this occurred.     Patient was admitted 4/28-4/29 for abnormal outpatient labs with a finding of anemia with a hemoglobin of 4.2. She was given 2 units of packed red blood cells. She was euvolemic but was given IV diuretics between units.  She went to her PCP office in 5/3 and was  told she needed work up for new anemia. She notes after this news and being told she needed testing she had a panic attack and her heart rate was elevated. She was sent to the ER for evaluation of Tachycardia and hemoglobin of 4.2. She denies any further heart racing. She was started on Jardiance at last OV and doing well. She was started back on Entresto in April but notes the cost is of concern.     The following portions of the patient's history were reviewed and updated as appropriate: allergies, current medications, past medical history, past social history, past and problem list.    Allergies   Allergen Reactions    Phenergan [Promethazine Hcl]     Promethazine Unknown - High  Severity       Current Outpatient Medications:     acetaminophen (TYLENOL) 650 MG 8 hr tablet, Take 1 tablet by mouth Every 8 (Eight) Hours As Needed for Mild Pain., Disp: , Rfl:     busPIRone (BUSPAR) 5 MG tablet, Take 1 tablet by mouth 3 (Three) Times a Day As Needed (anxiety)., Disp: 90 tablet, Rfl: 0    cholecalciferol (VITAMIN D3) 25 MCG (1000 UT) tablet, Take 1 tablet by mouth Daily., Disp: 30 tablet, Rfl: 2    empagliflozin (Jardiance) 10 MG tablet tablet, Take 1 tablet by mouth Daily., Disp: 30 tablet, Rfl: 11    furosemide (LASIX) 20 MG tablet, Take 1 tablet by mouth Daily As Needed (for wt gain of 2 lbs from the day before)., Disp: 15 tablet, Rfl: 3    metoprolol succinate XL (TOPROL-XL) 50 MG 24 hr tablet, Take 1.5 tablets by mouth Daily., Disp: 30 tablet, Rfl: 11    oxyCODONE (ROXICODONE) 15 MG immediate release tablet, Take 1 tablet by mouth 4 (Four) Times a Day As Needed for Moderate Pain., Disp: , Rfl:     sacubitril-valsartan (ENTRESTO) 24-26 MG tablet, Take 1 tablet by mouth 2 (Two) Times a Day., Disp: 60 tablet, Rfl: 11    spironolactone (ALDACTONE) 25 MG tablet, Take 1 tablet by mouth Daily., Disp: 90 tablet, Rfl: 3    Social History     Socioeconomic History    Marital status: Single   Tobacco Use    Smoking status: Former    Smokeless tobacco: Never    Tobacco comments:     quit 55 years ago   Vaping Use    Vaping Use: Never used   Substance and Sexual Activity    Alcohol use: No    Drug use: No    Sexual activity: Not Currently       Review of Systems   Constitutional: Positive for malaise/fatigue. Negative for chills, decreased appetite, fever, weight gain and weight loss.   HENT:  Negative for nosebleeds.    Eyes:  Negative for visual disturbance.   Cardiovascular:  Negative for chest pain, dyspnea on exertion, leg swelling, near-syncope, orthopnea, palpitations, paroxysmal nocturnal dyspnea and syncope.   Respiratory:  Negative for cough, hemoptysis, shortness of breath and snoring.   "  Endocrine: Negative for cold intolerance and heat intolerance.   Hematologic/Lymphatic: Negative for bleeding problem. Does not bruise/bleed easily.   Skin:  Negative for rash.   Musculoskeletal:  Negative for back pain and falls.   Gastrointestinal:  Negative for abdominal pain, constipation, diarrhea, heartburn, melena, nausea and vomiting.   Genitourinary:  Negative for hematuria.   Neurological:  Negative for dizziness, headaches and light-headedness.   Psychiatric/Behavioral:  Negative for altered mental status.    Allergic/Immunologic: Negative for persistent infections.            Objective:     Constitutional:       Appearance: Healthy appearance. Not in distress. Frail.   Eyes:      Pupils: Pupils are equal, round, and reactive to light.   HENT:      Head: Normocephalic and atraumatic.      Nose: Nose normal.    Mouth/Throat:      Dentition: Normal.   Pulmonary:      Effort: Pulmonary effort is normal.      Breath sounds: Normal breath sounds.   Chest:      Chest wall: Not tender to palpatation.   Cardiovascular:      PMI at left midclavicular line. Normal rate. Regular rhythm.      Murmurs: There is no murmur.   Pulses:     Intact distal pulses.   Edema:     Peripheral edema absent.   Abdominal:      General: Bowel sounds are normal.      Palpations: Abdomen is soft.   Musculoskeletal: Normal range of motion.      Cervical back: Normal range of motion. Skin:     General: Skin is warm and dry.   Neurological:      Mental Status: Alert, oriented to person, place, and time and oriented to person, place and time.   Psychiatric:         Attention and Perception: Attention normal.         Mood and Affect: Mood normal.           ECG 12 Lead    Date/Time: 6/1/2023 1:38 PM  Performed by: Romeo Shaffer APRN  Authorized by: Romeo Shaffer APRN   Comparison: compared with previous ECG from 5/11/2023  Similar to previous ECG  Rhythm: sinus rhythm      /61   Pulse 64   Ht 157.5 cm (62\")   Wt 45.2 kg (99 " lb 9.6 oz)   LMP  (LMP Unknown)   BMI 18.22 kg/m²   Lab Review:   I have reviewed       Lab Results   Component Value Date    CHOL 118 11/25/2021    CHLPL 119 04/27/2023    TRIG 55 04/27/2023    HDL 43 04/27/2023    LDL 64 04/27/2023      Results for orders placed during the hospital encounter of 05/03/23    Adult Transthoracic Echo Complete W/ Cont if Necessary Per Protocol    Interpretation Summary    Left ventricular systolic function is severely decreased. Left ventricular ejection fraction appears to be 31 - 35%.    The left ventricular cavity is mildly dilated.    Normal right ventricular cavity size and systolic function noted.    Mild aortic valve regurgitation is present.    Mild mitral valve regurgitation is present.    Mild tricuspid valve regurgitation is present. Estimated right ventricular systolic pressure from tricuspid regurgitation is markedly elevated (>55 mmHg).     Assessment:          Diagnosis Plan   1. Chronic systolic congestive heart failure        2. Anemia, unspecified type        3. AICD (automatic cardioverter/defibrillator) present        4. Paroxysmal SVT (supraventricular tachycardia)        5. Primary hypertension        6. Mixed hyperlipidemia        7. Paroxysmal atrial fibrillation               Plan:       Chronic Systolic Congestive Heart Failure- PRN Lasix. On Entresto, Toprol and Aldactone. Started on Jardiance at last OV. Did not have baseball. Was prescribed Corlanor for elevated heart rates but have improved with treatment of anemia and she never started due to cost. She needs assistance for Entresto and paperwork has been given today. BMP today.   Anemia- recent admission. Work up ongoing.. Referred to Hematology and is completing stool testing.   AICD- placed in 2022. AICD sock for SVT earlier this year  SVT- paroxysmal. On Toprol. Was referred to Dr. Kingston by her PCP  Hypertension- blood pressure stable, borderline low but stable.   Mixed Hyperlipidemia- LDL 64 on  recent labs. At goal.  A-fib RVR- 5/3 seen in PCP office for profound anemia and notes she had a panic attack in PCP office. She felt her heart racing. Diagnosed with A-fib but this is possibly SVT- which she has had before. [id number] is 5. Has not started anticoagulation due to brief and recent hemoglobin of 4 and required transfusion. Continue to monitor for any future A-fib.       BMP today. Follow up in July with Dr. Gordillo.

## 2023-06-16 DIAGNOSIS — I50.22 CHRONIC SYSTOLIC HEART FAILURE: ICD-10-CM

## 2023-06-16 RX ORDER — METOPROLOL SUCCINATE 50 MG/1
TABLET, EXTENDED RELEASE ORAL
Qty: 30 TABLET | Refills: 11 | Status: SHIPPED | OUTPATIENT
Start: 2023-06-16

## 2023-07-14 NOTE — TELEPHONE ENCOUNTER
Caller: Mitzi Villafuerte    Relationship to patient: Self    Best call back number: [phone]    Chief complaint: PATIENT WANTED TO RESCHEDULE AN APPOINTMENT SHE CANCELLED    Type of visit: INFUSION       If rescheduling, when is the original appointment: 7-6-23

## 2023-07-19 PROCEDURE — 93297 REM INTERROG DEV EVAL ICPMS: CPT | Performed by: INTERNAL MEDICINE

## 2023-07-19 PROCEDURE — G2066 INTER DEVC REMOTE 30D: HCPCS | Performed by: INTERNAL MEDICINE

## 2023-07-20 ENCOUNTER — LAB (OUTPATIENT)
Dept: LAB | Facility: HOSPITAL | Age: 78
End: 2023-07-20
Payer: MEDICARE

## 2023-07-20 DIAGNOSIS — D50.8 OTHER IRON DEFICIENCY ANEMIA: Primary | ICD-10-CM

## 2023-07-20 LAB
BASOPHILS # BLD AUTO: 0.05 10*3/MM3 (ref 0–0.2)
BASOPHILS NFR BLD AUTO: 0.4 % (ref 0–1.5)
DEPRECATED RDW RBC AUTO: 49.8 FL (ref 37–54)
EOSINOPHIL # BLD AUTO: 0.31 10*3/MM3 (ref 0–0.4)
EOSINOPHIL NFR BLD AUTO: 2.6 % (ref 0.3–6.2)
ERYTHROCYTE [DISTWIDTH] IN BLOOD BY AUTOMATED COUNT: 16.4 % (ref 12.3–15.4)
HCT VFR BLD AUTO: 31.1 % (ref 34–46.6)
HGB BLD-MCNC: 8.5 G/DL (ref 12–15.9)
HOLD SPECIMEN: NORMAL
IMM GRANULOCYTES # BLD AUTO: 0.06 10*3/MM3 (ref 0–0.05)
IMM GRANULOCYTES NFR BLD AUTO: 0.5 % (ref 0–0.5)
LYMPHOCYTES # BLD AUTO: 1.31 10*3/MM3 (ref 0.7–3.1)
LYMPHOCYTES NFR BLD AUTO: 10.9 % (ref 19.6–45.3)
MCH RBC QN AUTO: 23 PG (ref 26.6–33)
MCHC RBC AUTO-ENTMCNC: 27.3 G/DL (ref 31.5–35.7)
MCV RBC AUTO: 84.3 FL (ref 79–97)
MONOCYTES # BLD AUTO: 0.82 10*3/MM3 (ref 0.1–0.9)
MONOCYTES NFR BLD AUTO: 6.8 % (ref 5–12)
NEUTROPHILS NFR BLD AUTO: 78.8 % (ref 42.7–76)
NEUTROPHILS NFR BLD AUTO: 9.43 10*3/MM3 (ref 1.7–7)
NRBC BLD AUTO-RTO: 0 /100 WBC (ref 0–0.2)
PLATELET # BLD AUTO: 325 10*3/MM3 (ref 140–450)
PMV BLD AUTO: 8.7 FL (ref 6–12)
RBC # BLD AUTO: 3.69 10*6/MM3 (ref 3.77–5.28)
WBC NRBC COR # BLD: 11.98 10*3/MM3 (ref 3.4–10.8)

## 2023-07-20 PROCEDURE — 36415 COLL VENOUS BLD VENIPUNCTURE: CPT

## 2023-07-20 PROCEDURE — 85025 COMPLETE CBC W/AUTO DIFF WBC: CPT

## 2023-07-24 ENCOUNTER — OFFICE VISIT (OUTPATIENT)
Dept: CARDIOLOGY | Facility: CLINIC | Age: 78
End: 2023-07-24
Payer: MEDICARE

## 2023-07-24 VITALS
HEIGHT: 62 IN | WEIGHT: 99 LBS | DIASTOLIC BLOOD PRESSURE: 60 MMHG | BODY MASS INDEX: 18.22 KG/M2 | SYSTOLIC BLOOD PRESSURE: 110 MMHG | HEART RATE: 108 BPM

## 2023-07-24 DIAGNOSIS — I42.0 DILATED CARDIOMYOPATHY: ICD-10-CM

## 2023-07-24 DIAGNOSIS — I50.22 CHRONIC HFREF (HEART FAILURE WITH REDUCED EJECTION FRACTION): ICD-10-CM

## 2023-07-24 DIAGNOSIS — I10 PRIMARY HYPERTENSION: ICD-10-CM

## 2023-07-24 DIAGNOSIS — I50.22 CHRONIC SYSTOLIC HEART FAILURE: Primary | ICD-10-CM

## 2023-07-24 DIAGNOSIS — Z95.810 AICD (AUTOMATIC CARDIOVERTER/DEFIBRILLATOR) PRESENT: ICD-10-CM

## 2023-07-24 PROCEDURE — 99213 OFFICE O/P EST LOW 20 MIN: CPT | Performed by: INTERNAL MEDICINE

## 2023-07-24 PROCEDURE — 1160F RVW MEDS BY RX/DR IN RCRD: CPT | Performed by: INTERNAL MEDICINE

## 2023-07-24 PROCEDURE — 3078F DIAST BP <80 MM HG: CPT | Performed by: INTERNAL MEDICINE

## 2023-07-24 PROCEDURE — 1159F MED LIST DOCD IN RCRD: CPT | Performed by: INTERNAL MEDICINE

## 2023-07-24 PROCEDURE — 93000 ELECTROCARDIOGRAM COMPLETE: CPT | Performed by: INTERNAL MEDICINE

## 2023-07-24 PROCEDURE — 3074F SYST BP LT 130 MM HG: CPT | Performed by: INTERNAL MEDICINE

## 2023-07-24 NOTE — PROGRESS NOTES
"Subjective    Mitzi Villafuerte is a 77 y.o. female. Fu of chf    History of Present Illness     CHRONIC HFrEF:  Is active and on Entresto with improving LVEF per ECHO. Stable stamina and cp or unusual soa. Daily wts are stable and no need for xtra Lasix. Did not get the Corlanor filled 2/2 cost issues \"but I'm working on that\". EKG today is nsc x HR is faster. She thinks that she is on the the \"50mg dose of Entresto\" and she will call from home later to [facility]. Most recent labs are good.    DIL CMO:  Has and aicd good Heart Logic scores and ap/=2/0 and no tachy tx needed and at burden = 1%        The following portions of the patient's history were reviewed and updated as appropriate: allergies, current medications, past family history, past medical history, past social history, past surgical history and problem list.    Patient Active Problem List   Diagnosis    Closed displaced comminuted fracture of shaft of left femur    Closed displaced fracture of shaft of third metacarpal bone of left hand    B-cell lymphoma    Cardiomyopathy    Chronic low back pain with left-sided sciatica    Chronic pain disorder    Chronic pain due to neoplasm    Hip pain    Primary hypertension    Lung mass    Mixed hyperlipidemia    Primary insomnia    Vitamin D deficiency    Dilated cardiomyopathy    Pleural effusion    Atrial fibrillation with RVR    SOB (shortness of breath)    Chronic systolic heart failure    Tachycardia    Pedal edema    Chronic congestive heart failure    High risk medication use    AICD (automatic cardioverter/defibrillator) present    Paroxysmal SVT (supraventricular tachycardia)    Anemia, unspecified type    Acute cystitis without hematuria    Chronic HFrEF (heart failure with reduced ejection fraction)    Atrial fibrillation with rapid ventricular response       Allergies   Allergen Reactions    Phenergan [Promethazine Hcl]     Promethazine Unknown - High Severity       Family History   Problem Relation Age " of Onset    Heart failure Mother     Liver disease Father     Heart disease Sister        Social History     Socioeconomic History    Marital status: Single   Tobacco Use    Smoking status: Former    Smokeless tobacco: Never    Tobacco comments:     quit 55 years ago   Vaping Use    Vaping Use: Never used   Substance and Sexual Activity    Alcohol use: No    Drug use: No    Sexual activity: Not Currently         Current Outpatient Medications:     acetaminophen (TYLENOL) 650 MG 8 hr tablet, Take 1 tablet by mouth Every 8 (Eight) Hours As Needed for Mild Pain., Disp: , Rfl:     busPIRone (BUSPAR) 5 MG tablet, Take 1 tablet by mouth 3 (Three) Times a Day As Needed (anxiety)., Disp: 90 tablet, Rfl: 0    cholecalciferol (VITAMIN D3) 25 MCG (1000 UT) tablet, Take 1 tablet by mouth Daily., Disp: 30 tablet, Rfl: 2    empagliflozin (Jardiance) 10 MG tablet tablet, Take 1 tablet by mouth Daily., Disp: 30 tablet, Rfl: 11    furosemide (LASIX) 20 MG tablet, Take 1 tablet by mouth Daily As Needed (for wt gain of 2 lbs from the day before)., Disp: 15 tablet, Rfl: 3    metoprolol succinate XL (TOPROL-XL) 50 MG 24 hr tablet, TAKE 1 TABLET BY MOUTH DAILY, Disp: 30 tablet, Rfl: 11    oxyCODONE (ROXICODONE) 15 MG immediate release tablet, Take 1 tablet by mouth 4 (Four) Times a Day As Needed for Moderate Pain., Disp: , Rfl:     sacubitril-valsartan (ENTRESTO) 24-26 MG tablet, Take 1 tablet by mouth 2 (Two) Times a Day., Disp: 60 tablet, Rfl: 11    spironolactone (ALDACTONE) 25 MG tablet, Take 1 tablet by mouth Daily., Disp: 90 tablet, Rfl: 3    Past Surgical History:   Procedure Laterality Date    CARDIAC ELECTROPHYSIOLOGY PROCEDURE N/A 3/2/2022    Procedure: ICD DC new;  Surgeon: Austin Gordillo MD;  Location: [facility] CATH INVASIVE LOCATION;  Service: Cardiology;  Laterality: N/A;    CHOLECYSTECTOMY      HERNIA REPAIR      HIP TROCHANTERIC NAILING WITH INTRAMEDULLARY HIP SCREW Left 2/2/2017    Procedure: HIP TROCANTERIC  "NAILING LONG WITH INTRAMEDULLARY HIP SCREW WITH CAST APPLICATION TO LEFT HAND;  Surgeon: Beni Culp MD;  Location: [facility] OR;  Service:        Review of Systems   Constitutional:  Negative for activity change, appetite change, fatigue and unexpected weight change.   Cardiovascular:  Negative for chest pain, palpitations and leg swelling.   Gastrointestinal:  Negative for abdominal pain and blood in stool.   Genitourinary:  Negative for difficulty urinating and hematuria.   Musculoskeletal:  Positive for arthralgias.   Neurological:  Negative for syncope and light-headedness.     /60   Pulse 108   Ht 157.5 cm (62\")   Wt 44.9 kg (99 lb)   LMP  (LMP Unknown)   BMI 18.11 kg/m²   Procedures    Objective   Physical Exam  Constitutional:       Comments: Thin   Cardiovascular:      Rate and Rhythm: Regular rhythm. Tachycardia present.      Pulses: Normal pulses.      Heart sounds: Normal heart sounds. No murmur heard.    No friction rub. No gallop.   Pulmonary:      Effort: Pulmonary effort is normal.      Breath sounds: Normal breath sounds. No wheezing or rales.   Abdominal:      General: Bowel sounds are normal.      Tenderness: There is no abdominal tenderness.   Musculoskeletal:      Right lower leg: No edema.      Left lower leg: No edema.   Skin:     General: Skin is warm and dry.   Neurological:      General: No focal deficit present.      Mental Status: She is oriented to person, place, and time.   Psychiatric:         Mood and Affect: Mood normal.       Assessment & Plan   Diagnoses and all orders for this visit:    1. Chronic systolic heart failure (Primary)  Comments:  compensated - Corlanor would be helpful if cost issues can be resolved  Orders:  -     ECG 12 Lead    2. Dilated cardiomyopathy  Comments:  moderate and improving LVEF    3. Primary hypertension  Comments:  controlled    4. AICD (automatic cardioverter/defibrillator) present  Comments:  good function    5. Chronic HFrEF (heart " failure with reduced ejection fraction)  Comments:  compensated                 Return in about 3 months (around 10/24/2023).  Orders Placed This Encounter   Procedures    ECG 12 Lead     Order Specific Question:   Reason for Exam:     Answer:   [email or web address]     Order Specific Question:   Release to patient     Answer:   Routine Release

## 2023-07-28 ENCOUNTER — TELEPHONE (OUTPATIENT)
Dept: CARDIOLOGY | Facility: CLINIC | Age: 78
End: 2023-07-28
Payer: COMMERCIAL

## 2023-07-28 NOTE — TELEPHONE ENCOUNTER
I called Mrs Villafuerte to let her know Jardiance patient assistance program denied patient assistance until she attempts to qualify for Medicaid. If she is denied for that they program will reconsider.    Pt has been notified and will file for medicaid and let me know the outcome.

## 2023-08-03 ENCOUNTER — LAB (OUTPATIENT)
Dept: LAB | Facility: HOSPITAL | Age: 78
End: 2023-08-03
Payer: MEDICARE

## 2023-08-03 ENCOUNTER — INFUSION (OUTPATIENT)
Dept: ONCOLOGY | Facility: HOSPITAL | Age: 78
End: 2023-08-03
Payer: MEDICARE

## 2023-08-03 VITALS
HEIGHT: 62 IN | TEMPERATURE: 97.2 F | DIASTOLIC BLOOD PRESSURE: 40 MMHG | SYSTOLIC BLOOD PRESSURE: 101 MMHG | OXYGEN SATURATION: 100 % | BODY MASS INDEX: 17.74 KG/M2 | RESPIRATION RATE: 18 BRPM | WEIGHT: 96.4 LBS | HEART RATE: 74 BPM

## 2023-08-03 DIAGNOSIS — D50.8 OTHER IRON DEFICIENCY ANEMIA: ICD-10-CM

## 2023-08-03 DIAGNOSIS — D64.9 ANEMIA, UNSPECIFIED TYPE: Primary | ICD-10-CM

## 2023-08-03 LAB
BASOPHILS # BLD AUTO: 0.04 10*3/MM3 (ref 0–0.2)
BASOPHILS NFR BLD AUTO: 0.4 % (ref 0–1.5)
DEPRECATED RDW RBC AUTO: 54.2 FL (ref 37–54)
EOSINOPHIL # BLD AUTO: 0.26 10*3/MM3 (ref 0–0.4)
EOSINOPHIL NFR BLD AUTO: 2.6 % (ref 0.3–6.2)
ERYTHROCYTE [DISTWIDTH] IN BLOOD BY AUTOMATED COUNT: 17.4 % (ref 12.3–15.4)
HCT VFR BLD AUTO: 32.7 % (ref 34–46.6)
HGB BLD-MCNC: 8.8 G/DL (ref 12–15.9)
IMM GRANULOCYTES # BLD AUTO: 0.04 10*3/MM3 (ref 0–0.05)
IMM GRANULOCYTES NFR BLD AUTO: 0.4 % (ref 0–0.5)
LYMPHOCYTES # BLD AUTO: 1.1 10*3/MM3 (ref 0.7–3.1)
LYMPHOCYTES NFR BLD AUTO: 11.1 % (ref 19.6–45.3)
MCH RBC QN AUTO: 23 PG (ref 26.6–33)
MCHC RBC AUTO-ENTMCNC: 26.9 G/DL (ref 31.5–35.7)
MCV RBC AUTO: 85.6 FL (ref 79–97)
MONOCYTES # BLD AUTO: 0.72 10*3/MM3 (ref 0.1–0.9)
MONOCYTES NFR BLD AUTO: 7.2 % (ref 5–12)
NEUTROPHILS NFR BLD AUTO: 7.78 10*3/MM3 (ref 1.7–7)
NEUTROPHILS NFR BLD AUTO: 78.3 % (ref 42.7–76)
NRBC BLD AUTO-RTO: 0 /100 WBC (ref 0–0.2)
PLATELET # BLD AUTO: 474 10*3/MM3 (ref 140–450)
PMV BLD AUTO: 8.9 FL (ref 6–12)
RBC # BLD AUTO: 3.82 10*6/MM3 (ref 3.77–5.28)
WBC NRBC COR # BLD: 9.94 10*3/MM3 (ref 3.4–10.8)

## 2023-08-03 PROCEDURE — 96375 TX/PRO/DX INJ NEW DRUG ADDON: CPT

## 2023-08-03 PROCEDURE — 63710000001 ACETAMINOPHEN 325 MG TABLET: Performed by: NURSE PRACTITIONER

## 2023-08-03 PROCEDURE — 96374 THER/PROPH/DIAG INJ IV PUSH: CPT

## 2023-08-03 PROCEDURE — 96367 TX/PROPH/DG ADDL SEQ IV INF: CPT

## 2023-08-03 PROCEDURE — 25010000002 IRON SUCROSE PER 1 MG: Performed by: NURSE PRACTITIONER

## 2023-08-03 PROCEDURE — 85025 COMPLETE CBC W/AUTO DIFF WBC: CPT

## 2023-08-03 PROCEDURE — 36415 COLL VENOUS BLD VENIPUNCTURE: CPT

## 2023-08-03 PROCEDURE — A9270 NON-COVERED ITEM OR SERVICE: HCPCS | Performed by: NURSE PRACTITIONER

## 2023-08-03 RX ORDER — SODIUM CHLORIDE 9 MG/ML
250 INJECTION, SOLUTION INTRAVENOUS ONCE
Status: COMPLETED | OUTPATIENT
Start: 2023-08-03 | End: 2023-08-03

## 2023-08-03 RX ORDER — FAMOTIDINE 10 MG/ML
20 INJECTION, SOLUTION INTRAVENOUS AS NEEDED
Status: DISCONTINUED | OUTPATIENT
Start: 2023-08-03 | End: 2023-08-03 | Stop reason: HOSPADM

## 2023-08-03 RX ORDER — FAMOTIDINE 10 MG/ML
20 INJECTION, SOLUTION INTRAVENOUS ONCE
Status: COMPLETED | OUTPATIENT
Start: 2023-08-03 | End: 2023-08-03

## 2023-08-03 RX ORDER — DIPHENHYDRAMINE HYDROCHLORIDE 50 MG/ML
50 INJECTION INTRAMUSCULAR; INTRAVENOUS AS NEEDED
Status: DISCONTINUED | OUTPATIENT
Start: 2023-08-03 | End: 2023-08-03 | Stop reason: HOSPADM

## 2023-08-03 RX ORDER — ACETAMINOPHEN 325 MG/1
650 TABLET ORAL ONCE
Status: COMPLETED | OUTPATIENT
Start: 2023-08-03 | End: 2023-08-03

## 2023-08-03 RX ADMIN — ACETAMINOPHEN 650 MG: 325 TABLET ORAL at 11:09

## 2023-08-03 RX ADMIN — IRON SUCROSE 200 MG: 20 INJECTION, SOLUTION INTRAVENOUS at 11:11

## 2023-08-03 RX ADMIN — FAMOTIDINE 20 MG: 10 INJECTION INTRAVENOUS at 11:10

## 2023-08-03 RX ADMIN — SODIUM CHLORIDE 250 ML: 9 INJECTION, SOLUTION INTRAVENOUS at 11:09

## 2023-08-10 NOTE — PROGRESS NOTES
Called pts vitals after iron infusion complete to [facility].  S/w Brianda Forman LPN.  /49, , temp 99.2.  Potassium 3.2.  Per brianda she will call patient with instructions  for her low potassium.  Received a call back from Brianda regarding VS.  Per Amy give Tylenol and a bolus of  NS.  Patient and niece were not concerned with her vitals and state that the HR and bp were normal for her.  She states she has had a stomach virus for a couple of days.  Pt and niece left without the Tylenol and fluids, in stable condition.  MYAH Sanders RN

## 2023-08-10 NOTE — TELEPHONE ENCOUNTER
Pt  had iron infusion today.  At approx 1420 Brianda Forman LPN reported that patient's post vitals were Temp 99.2, , /49.  This is a significant change from pre vitals which were Temp 97.2, HR 87 and /65.  It is reported that patient is otherwise asymptomatic.  Advised to give Tylenol 650 mg PO and 500 cc NS bolus and monitor vitals.  When Nurse Dickson called back to infusion center the patient had been discharged.  She states she was told by Janell Sanders RN that change in vitals had been discussed with patient and her niece who was transporting her today.  They felt that this was the patient's normal and that she felt stable to leave.      I attempted to call patient to discuss how she felt and my concerns regarding her change in vitals. No answer.  Message left.      Amy Rothman, APRN  08/10/2023

## 2023-08-11 PROBLEM — I48.0 PAF (PAROXYSMAL ATRIAL FIBRILLATION): Status: ACTIVE | Noted: 2023-01-01

## 2023-08-11 PROBLEM — I63.9 STROKE: Status: ACTIVE | Noted: 2023-01-01

## 2023-08-11 PROBLEM — E43 SEVERE MALNUTRITION: Status: ACTIVE | Noted: 2023-01-01

## 2023-08-11 NOTE — PLAN OF CARE
Goal Outcome Evaluation:  Plan of Care Reviewed With: patient, caregiver        Progress: no change                SPEECH-LANGUAGE PATHOLOGY EVALUATION - SWALLOW  Subjective: The patient was seen on this date for a Clinical Swallow evaluation.  Patient was alert and cooperative.  Significant history: Presented with stroke-like symptoms. to include acute onset of left neglect, right gaze preference, left lower facial droop, left arm paralysis, and left leg weakness. Suspect right MCA stroke likely secondary to A-fib. She received TNK last night and has some improvement in function.   Objective: Textures given included thin liquid, nectar thick liquid, honey thick liquid, puree consistency, and regular consistency.  Assessment: Difficulties were noted with thin liquid, nectar thick liquid, and regular consistency.  Observations: Increased prep time with slightly prolonged mastication of regular solid; with increasing time patient completed with no concerns and no oral residue post swallow. Audible dyscoordination with nectar thick and thin liquid swallows with a delayed cough noted. Vocal quality remained clear throughout.     SLP Findings:  Patient presents with moderate oropharyngeal dysphagia, without esophageal component.   Recommendations: Diet Textures: honey thick liquid,  soft to chew with ground meats  food.  Medications should be taken whole or crushed with puree. May have water and ice between meals after oral care, under staff or family supervision and with the recommended strategies for safe swallowing.   Recommended Strategies: Upright for PO, small bites and sips, and supervision with all PO. Oral care before and after all meals and PRN. 1:1 supervision with all meals. Ensure proper alertness prior to attempting PO meal.   Other Recommended Evaluations: Speech-Language Evaluation  and VFSS with any increasing concerns with dysphagia at the bedside.   Dysphagia therapy is recommended. Rationale: See  above.

## 2023-08-11 NOTE — H&P
[facility] Medicine Services  HISTORY AND PHYSICAL    Date of Admission: 8/10/2023  Primary Care Physician: Urban Main MD    Subjective   Primary Historian: Patient    Chief Complaint: Mental status changes    History of Present Illness  77 year old female with PMH of CHF chronic systolic and diastolic, AICD, HTN, Afib, anemia, lymphoma in remission, that arrived to the ER with left sided weakness and has been evaluated by Dr Kelley and treated with TPA. Part of her blood work includes a troponin which is elevated and we are requested to admit.     Review of Systems   Otherwise complete ROS reviewed and negative except as mentioned in the HPI.    Past Medical History:   Past Medical History:   Diagnosis Date    Broken femur     Chronic combined systolic (congestive) and diastolic (congestive) heart failure     Fracture of hip     left, due to lymphoma, treated non-operatively    Hypertension     Lymphoma     Presence of automatic cardioverter/defibrillator (AICD)      Past Surgical History:  Past Surgical History:   Procedure Laterality Date    CARDIAC ELECTROPHYSIOLOGY PROCEDURE N/A 3/2/2022    Procedure: ICD DC new;  Surgeon: Austin Gordillo MD;  Location: [facility] CATH INVASIVE LOCATION;  Service: Cardiology;  Laterality: N/A;    CHOLECYSTECTOMY      HERNIA REPAIR      HIP TROCHANTERIC NAILING WITH INTRAMEDULLARY HIP SCREW Left 2/2/2017    Procedure: HIP TROCANTERIC NAILING LONG WITH INTRAMEDULLARY HIP SCREW WITH CAST APPLICATION TO LEFT HAND;  Surgeon: Beni Culp MD;  Location: [facility] OR;  Service:      Social History:  reports that she has quit smoking. She has never used smokeless tobacco. She reports that she does not drink alcohol and does not use drugs.    Family History: family history includes Heart disease in her sister; Heart failure in her mother; Liver disease in her father.       Allergies:  Allergies   Allergen Reactions    Phenergan  "[Promethazine Hcl]     Promethazine Unknown - High Severity       Medications:  Prior to Admission medications    Medication Sig Start Date End Date Taking? Authorizing Provider   acetaminophen (TYLENOL) 650 MG 8 hr tablet Take 1 tablet by mouth Every 8 (Eight) Hours As Needed for Mild Pain.    ProviderJamin MD   busPIRone (BUSPAR) 5 MG tablet Take 1 tablet by mouth 3 (Three) Times a Day As Needed (anxiety). 4/27/23   Urban Main MD   cholecalciferol (VITAMIN D3) 25 MCG (1000 UT) tablet Take 1 tablet by mouth Daily. 4/29/23   Bryan Torres MD   empagliflozin (Jardiance) 10 MG tablet tablet Take 1 tablet by mouth Daily. 5/11/23   Romeo Shaffer APRN   escitalopram (LEXAPRO) 10 MG tablet Take 1 tablet by mouth As Needed.    ProviderJamin MD   furosemide (LASIX) 20 MG tablet Take 1 tablet by mouth Daily As Needed (for wt gain of 2 lbs from the day before). 4/25/23   Austin Gordillo MD   metoprolol succinate XL (TOPROL-XL) 50 MG 24 hr tablet TAKE 1 TABLET BY MOUTH DAILY 6/16/23   Austin Gordillo MD   oxyCODONE (ROXICODONE) 15 MG immediate release tablet Take 1 tablet by mouth 4 (Four) Times a Day As Needed for Moderate Pain.    ProviderJamin MD   sacubitril-valsartan (ENTRESTO) 24-26 MG tablet Take 1 tablet by mouth 2 (Two) Times a Day. 4/25/23   Austin Gordillo MD   spironolactone (ALDACTONE) 25 MG tablet Take 1 tablet by mouth Daily. 6/1/23   Romeo Shaffer APRN     I have utilized all available immediate resources to obtain, update, or review the patient's current medications (including all prescriptions, over-the-counter products, herbals, cannabis/cannabidiol products, and vitamin/mineral/dietary (nutritional) supplements).    Objective     Vital Signs: /48   Pulse 70   Temp 98 øF (36.7 øC) (Oral)   Resp 20   Ht 157.5 cm (62\")   Wt 43.9 kg (96 lb 11.2 oz)   LMP  (LMP Unknown)   SpO2 99%   BMI 17.69 kg/mý   Physical Exam  Vitals " reviewed.   Constitutional:       General: She is not in acute distress.     Appearance: She is well-developed. She is not toxic-appearing.   HENT:      Head: Normocephalic and atraumatic.      Right Ear: External ear normal.      Left Ear: External ear normal.      Mouth/Throat:      Mouth: Mucous membranes are dry.      Pharynx: Oropharynx is clear.   Eyes:      General:         Right eye: No discharge.         Left eye: No discharge.      Extraocular Movements: Extraocular movements intact.      Conjunctiva/sclera: Conjunctivae normal.      Pupils: Pupils are equal, round, and reactive to light.   Neck:      Vascular: No JVD.   Cardiovascular:      Rate and Rhythm: Normal rate and regular rhythm.      Pulses: Normal pulses.      Heart sounds: Normal heart sounds. No murmur heard.    No friction rub. No gallop.   Pulmonary:      Effort: Pulmonary effort is normal. No respiratory distress.      Breath sounds: No stridor. No wheezing, rhonchi or rales.   Chest:      Chest wall: No tenderness.   Abdominal:      General: Bowel sounds are normal. There is no distension.      Palpations: Abdomen is soft.      Tenderness: There is no abdominal tenderness. There is no guarding or rebound.      Hernia: No hernia is present.   Musculoskeletal:         General: No swelling, tenderness or deformity. Normal range of motion.      Cervical back: Normal range of motion and neck supple. No rigidity or tenderness. No muscular tenderness.      Right lower leg: No edema.      Left lower leg: No edema.   Skin:     General: Skin is warm and dry.      Findings: No erythema or rash.   Neurological:      Mental Status: She is alert.      Motor: No abnormal muscle tone.      Comments: Awakens with stimulation but sleepy  Dysarthria, Left lower facial droop  LUE 0/5, LLL 2/5. RUE 5/5, RLE 5/5   Left sided numbness     Results Reviewed:  Lab Results (last 24 hours)       Procedure Component Value Units Date/Time    High Sensitivity Troponin  T 2Hr [[id number]]  (Abnormal) Collected: 08/11/23 0045    Specimen: Blood Updated: 08/11/23 0110     HS Troponin T 387 ng/L      Troponin T Delta 47 ng/L     Narrative:      High Sensitive Troponin T Reference Range:  <10.0 ng/L- Negative Female for AMI  <15.0 ng/L- Negative Male for AMI  >=10 - Abnormal Female indicating possible myocardial injury.  >=15 - Abnormal Male indicating possible myocardial injury.   Clinicians would have to utilize clinical acumen, EKG, Troponin, and serial changes to determine if it is an Acute Myocardial Infarction or myocardial injury due to an underlying chronic condition.         [place] Draw [[id number]] Collected: 08/10/23 2250    Specimen: Blood from Arm, Right Updated: 08/11/23 0001    Narrative:      The following orders were created for panel order [place] Draw.  Procedure                               Abnormality         Status                     ---------                               -----------         ------                     Green Top (Gel)[[id number]]                                  Final result               Lavender Top[[id number]]                                     Final result               Red Top[[id number]]                                          Final result               Light Blue Top[[id number]]                                   Final result                 Please view results for these tests on the individual orders.    Lavender Top [[id number]] Collected: 08/10/23 2250    Specimen: Blood from Arm, Right Updated: 08/11/23 0001     Extra Tube hold for add-on     Comment: Auto resulted       Red Top [[id number]] Collected: 08/10/23 2250    Specimen: Blood from Arm, Right Updated: 08/11/23 0001     Extra Tube Hold for add-ons.     Comment: Auto resulted.       Light Blue Top [[id number]] Collected: 08/10/23 2250    Specimen: Blood from Arm, Right Updated: 08/11/23 0001     Extra Tube Hold for add-ons.     Comment: Auto resulted       Green Top (Gel) [[id number]]  Collected: 08/10/23 2250    Specimen: Blood from Arm, Right Updated: 08/11/23 0001     Extra Tube Hold for add-ons.     Comment: Auto resulted.       Magnesium [[id number]]  (Normal) Collected: 08/10/23 2250    Specimen: Blood from Arm, Right Updated: 08/10/23 2347     Magnesium 1.9 mg/dL     Single High Sensitivity Troponin T [[id number]]  (Abnormal) Collected: 08/10/23 2250    Specimen: Blood from Arm, Right Updated: 08/10/23 2322     HS Troponin T 340 ng/L     Narrative:      High Sensitive Troponin T Reference Range:  <10.0 ng/L- Negative Female for AMI  <15.0 ng/L- Negative Male for AMI  >=10 - Abnormal Female indicating possible myocardial injury.  >=15 - Abnormal Male indicating possible myocardial injury.   Clinicians would have to utilize clinical acumen, EKG, Troponin, and serial changes to determine if it is an Acute Myocardial Infarction or myocardial injury due to an underlying chronic condition.         Comprehensive Metabolic Panel [[id number]]  (Abnormal) Collected: 08/10/23 2250    Specimen: Blood from Arm, Right Updated: 08/10/23 2318     Glucose 111 mg/dL      BUN 16 mg/dL      Creatinine 0.60 mg/dL      Sodium 139 mmol/L      Potassium 3.0 mmol/L      Chloride 102 mmol/L      CO2 26.0 mmol/L      Calcium 8.4 mg/dL      Total Protein 6.2 g/dL      Albumin 3.5 g/dL      ALT (SGPT) 12 U/L      AST (SGOT) 37 U/L      Alkaline Phosphatase 118 U/L      Total Bilirubin 0.3 mg/dL      Globulin 2.7 gm/dL      A/G Ratio 1.3 g/dL      BUN/Creatinine Ratio 26.7     Anion Gap 11.0 mmol/L      eGFR 92.6 mL/min/1.73     Narrative:      GFR Normal >60  Chronic Kidney Disease <60  Kidney Failure <15    The GFR formula is only valid for adults with stable renal function between ages 18 and 70.    Protime-INR [[id number]]  (Abnormal) Collected: 08/10/23 2250    Specimen: Blood from Arm, Right Updated: 08/10/23 2312     Protime 15.7 Seconds      INR 1.24    aPTT [[id number]]  (Normal) Collected: 08/10/23 2250     Specimen: Blood from Arm, Right Updated: 08/10/23 2312     PTT 29.1 seconds     CBC & Differential [[id number]]  (Abnormal) Collected: 08/10/23 2250    Specimen: Blood from Arm, Right Updated: 08/10/23 2300    Narrative:      The following orders were created for panel order CBC & Differential.  Procedure                               Abnormality         Status                     ---------                               -----------         ------                     CBC Auto Differential[[id number]]        Abnormal            Final result                 Please view results for these tests on the individual orders.    CBC Auto Differential [[id number]]  (Abnormal) Collected: 08/10/23 2250    Specimen: Blood from Arm, Right Updated: 08/10/23 2300     WBC 11.97 10*3/mm3      RBC 3.67 10*6/mm3      Hemoglobin 8.7 g/dL      Hematocrit 30.9 %      MCV 84.2 fL      MCH 23.7 pg      MCHC 28.2 g/dL      RDW 18.4 %      RDW-SD 56.4 fl      MPV 8.7 fL      Platelets 275 10*3/mm3      Neutrophil % 82.3 %      Lymphocyte % 7.9 %      Monocyte % 7.4 %      Eosinophil % 1.7 %      Basophil % 0.3 %      Immature Grans % 0.4 %      Neutrophils, Absolute 9.86 10*3/mm3      Lymphocytes, Absolute 0.94 10*3/mm3      Monocytes, Absolute 0.89 10*3/mm3      Eosinophils, Absolute 0.20 10*3/mm3      Basophils, Absolute 0.03 10*3/mm3      Immature Grans, Absolute 0.05 10*3/mm3      nRBC 0.0 /100 WBC     POC Glucose Once [[id number]]  (Abnormal) Collected: 08/10/23 2244    Specimen: Blood Updated: 08/10/23 2255     Glucose 169 mg/dL      Comment: : [id number] Antoni CisnerosMeter ID: [id number]             Imaging Results (Last 24 Hours)       Procedure Component Value Units Date/Time    XR Chest 1 View [[id number]] Resulted: 08/11/23 0018     Updated: 08/11/23 0018    CT CEREBRAL PERFUSION WITH & WITHOUT CONTRAST [[id number]] Resulted: 08/10/23 2252     Updated: 08/10/23 2308    CT Angiogram Head w AI Analysis of LVO [[id number]] Resulted:  08/10/23 2251     Updated: 08/10/23 2302    CT Angiogram Neck [[id number]] Resulted: 08/10/23 2251     Updated: 08/10/23 2302    CT Head Without Contrast Stroke Protocol [[id number]] Resulted: 08/10/23 2249     Updated: 08/10/23 2254          I have personally reviewed and interpreted the radiology studies and ECG obtained at time of admission.     Assessment / Plan   Assessment:   Active Hospital Problems    Diagnosis     **Stroke     PAF (paroxysmal atrial fibrillation)     AICD (automatic cardioverter/defibrillator) present     Cardiomyopathy     Primary hypertension     Chronic pain disorder     Chronic pain due to neoplasm      Treatment Plan  The patient will be admitted to my service here at [facility].   Admit to critical care  Vitals and neuro check per protocol  Post TPA stroke care per neurology  Elevated troponin consistent with stroke  Home medications on hold   Replace electrolytes    DVT prophylaxis > Patient anticoagulated    Medical Decision Making  Number and Complexity of problems: 4 complex medical problems  Differential Diagnosis: None    Conditions and Status        Condition is unchanged.     MDM Data  External documents reviewed: [email or web address] EHR  Cardiac tracing (EKG, telemetry) interpretation: NSR  Radiology interpretation: See above  Labs reviewed: See above  Any tests that were considered but not ordered: none     Decision rules/scores evaluated (example [id number], [place], etc): N/A     Discussed with: Patient     Care Planning  Shared decision making: Patient  Code status and discussions: Full code    Disposition  Social Determinants of Health that impact treatment or disposition: None  Estimated length of stay is over 2 midnights.     I confirmed that the patient's advanced care plan is present, code status is documented, and a surrogate decision maker is listed in the patient's medical record.     The patient's surrogate decision maker is see records.     The patient was seen and  examined by me on 8/11/2023 at 0216.    Electronically signed by Elio Greer MD, 08/11/23, 02:16 CDT.

## 2023-08-11 NOTE — PLAN OF CARE
Goal Outcome Evaluation:              Outcome Evaluation: NIH 11 on transfer into the unit. Oriented x4. Left-sided weakness improving. Potassium replaced. VSS.

## 2023-08-11 NOTE — PROGRESS NOTES
Malnutrition Severity Assessment    Patient Name:  Mitzi Villafuerte  YOB: 1945  MRN: [id number]  Admit Date:  8/10/2023    Patient meets criteria for : Severe Malnutrition    See details below.    Malnutrition Severity Assessment  Malnutrition Type: Chronic Disease - Related Malnutrition  Malnutrition Type (last 8 hours)       Malnutrition Severity Assessment       Row Name 08/11/23 0822       Malnutrition Severity Assessment    Malnutrition Type Chronic Disease - Related Malnutrition      Row Name 08/11/23 0822       Unintentional Weight Loss     Unintentional Weight Loss Findings Severe    Unintentional Weight Loss  Weight loss greater than 7.5% in three months      Row Name 08/11/23 0822       Muscle Loss    Loss of Muscle Mass Findings Moderate    Sabianist Region Moderate - slight depression      Row Name 08/11/23 0822       Fat Loss    Subcutaneous Fat Loss Findings Moderate    Orbital Region  Moderate -  somewhat hollowness, slightly dark circles      Row Name 08/11/23 0822       Criteria Met (Must meet criteria for severity in at least 2 of these categories: M Wasting, Fat Loss, Fluid, Secondary Signs, Wt. Status, Intake)    Patient meets criteria for  Severe Malnutrition                    Electronically signed by:  Michoacano Ramos RD  08/11/23 08:31 CDT

## 2023-08-11 NOTE — THERAPY EVALUATION
Acute Care - Speech Language Pathology   Swallow Initial Evaluation [facility]     Patient Name: Mitzi Villafuerte  : 1945  MRN: [id number]  Today's Date: 2023               Admit Date: 8/10/2023    SPEECH-LANGUAGE PATHOLOGY EVALUATION - SWALLOW  Subjective: The patient was seen on this date for a Clinical Swallow evaluation.  Patient was alert and cooperative.  Significant history: Presented with stroke-like symptoms. to include acute onset of left neglect, right gaze preference, left lower facial droop, left arm paralysis, and left leg weakness. Suspect right MCA stroke likely secondary to A-fib. She received TNK last night and has some improvement in function.   Objective: Textures given included thin liquid, nectar thick liquid, honey thick liquid, puree consistency, and regular consistency.  Assessment: Difficulties were noted with thin liquid, nectar thick liquid, and regular consistency.  Observations: Increased prep time with slightly prolonged mastication of regular solid; with increasing time patient completed with no concerns and no oral residue post swallow. Audible dyscoordination with nectar thick and thin liquid swallows with a delayed cough noted. Vocal quality remained clear throughout.     SLP Findings:  Patient presents with moderate oropharyngeal dysphagia, without esophageal component.   Recommendations: Diet Textures: honey thick liquid, soft to chew with ground meats food.  Medications should be taken whole or crushed with puree. May have water and ice between meals after oral care, under staff or family supervision and with the recommended strategies for safe swallowing.   Recommended Strategies: Upright for PO, small bites and sips, and supervision with all PO. Oral care before and after all meals and PRN. 1:1 supervision with all meals. Ensure proper alertness prior to attempting PO meal.   Other Recommended Evaluations: Speech-Language Evaluation  and VFSS with any increasing  concerns with dysphagia at the bedside.   Dysphagia therapy is recommended. Rationale: See above.    Milagro VINNY Maynard, MS CCC-SLP 8/11/2023 08:42 CDT    Visit Dx:     ICD-10-CM ICD-9-CM   1. Cerebrovascular accident (CVA), unspecified mechanism  I63.9 434.91   2. Oropharyngeal dysphagia  R13.12 787.22     Patient Active Problem List   Diagnosis    Closed displaced comminuted fracture of shaft of left femur    Closed displaced fracture of shaft of third metacarpal bone of left hand    B-cell lymphoma    Cardiomyopathy    Chronic low back pain with left-sided sciatica    Chronic pain disorder    Chronic pain due to neoplasm    Hip pain    Primary hypertension    Lung mass    Mixed hyperlipidemia    Primary insomnia    Vitamin D deficiency    Dilated cardiomyopathy    Pleural effusion    Atrial fibrillation with RVR    SOB (shortness of breath)    Chronic systolic heart failure    Tachycardia    Pedal edema    Chronic congestive heart failure    High risk medication use    AICD (automatic cardioverter/defibrillator) present    Paroxysmal SVT (supraventricular tachycardia)    Anemia, unspecified type    Acute cystitis without hematuria    Chronic HFrEF (heart failure with reduced ejection fraction)    Atrial fibrillation with rapid ventricular response    Stroke    PAF (paroxysmal atrial fibrillation)     Past Medical History:   Diagnosis Date    Broken femur     Chronic combined systolic (congestive) and diastolic (congestive) heart failure     Fracture of hip     left, due to lymphoma, treated non-operatively    Hypertension     Lymphoma     Presence of automatic cardioverter/defibrillator (AICD)      Past Surgical History:   Procedure Laterality Date    CARDIAC ELECTROPHYSIOLOGY PROCEDURE N/A 3/2/2022    Procedure: ICD DC new;  Surgeon: Austin Gordillo MD;  Location:  [facility];  Service: Cardiology;  Laterality: N/A;    CHOLECYSTECTOMY      HERNIA REPAIR      HIP TROCHANTERIC NAILING  WITH INTRAMEDULLARY HIP SCREW Left 2/2/2017    Procedure: HIP TROCANTERIC NAILING LONG WITH INTRAMEDULLARY HIP SCREW WITH CAST APPLICATION TO LEFT HAND;  Surgeon: Beni Culp MD;  Location: [facility] OR;  Service:        SLP Recommendation and Plan  SLP Swallowing Diagnosis: mild-moderate, oral dysphagia, suspected pharyngeal dysphagia (08/11/23 0720)  SLP Diet Recommendation: soft to chew textures, ground, honey thick liquids, water between meals after oral care, with supervision, ice chips between meals after oral care, with supervision (08/11/23 0720)  Recommended Precautions and Strategies: upright posture during/after eating, small bites of food and sips of liquid, general aspiration precautions, fatigue precautions, 1:1 supervision, assist with feeding (08/11/23 0720)  SLP Rec. for Method of Medication Administration: meds whole, with puree, as tolerated (08/11/23 0720)     Monitor for Signs of Aspiration: yes, notify SLP if any concerns (08/11/23 0720)  Recommended Diagnostics: VFSS (MBS), SLE/Cog/Motor Speech Evaluation (with any increasing concerns) (08/11/23 0720)  Swallow Criteria for Skilled Therapeutic Interventions Met: demonstrates skilled criteria (08/11/23 0720)  Anticipated Discharge Disposition (SLP): unknown (08/11/23 0720)  Rehab Potential/Prognosis, Swallowing: good, to achieve stated therapy goals (08/11/23 0720)  Therapy Frequency (Swallow): at least, 3 days per week (08/11/23 0720)  Predicted Duration Therapy Intervention (Days): until discharge (08/11/23 0720)  Oral Care Recommendations: Oral Care before breakfast, after meals and PRN, Toothbrush (08/11/23 0720)                                      Oral Care Recommendations: Oral Care before breakfast, after meals and PRN, Toothbrush (08/11/23 0720)    Plan of Care Reviewed With: patient, caregiver  Progress: no change      SWALLOW EVALUATION (last 72 hours)       SLP Adult Swallow Evaluation       Row Name 08/11/23 0720                    Rehab Evaluation    Document Type evaluation  -MG        Subjective Information no complaints  -MG        Patient Observations cooperative;agree to therapy;lethargic  -MG        Patient/Family/Caregiver Comments/Observations No family present.  -MG        Patient Effort good  -MG        Symptoms Noted During/After Treatment fatigue  -MG           General Information    Patient Profile Reviewed yes  -MG        Pertinent History Of Current Problem Presented with stroke-like symptoms. to include acute onset of left neglect, right gaze preference, left lower facial droop, left arm paralysis, and left leg weakness. Suspect right MCA stroke likely secondary to A-fib. She received TNK last night and has some improvement in function.  -MG        Current Method of Nutrition NPO  -MG        Precautions/Limitations, Vision difficult to assess  patient alert but eyes remained closed throughout session  -MG        Precautions/Limitations, Hearing WFL;for purposes of eval  -MG        Prior Level of Function-Communication WFL  -MG        Prior Level of Function-Swallowing no diet consistency restrictions  -MG        Plans/Goals Discussed with patient  -MG        Barriers to Rehab none identified  -MG        Patient's Goals for Discharge patient did not state  -MG           Pain    Additional Documentation Pain Scale: FACES Pre/Post-Treatment (Group)  -MG           Pain Scale: FACES Pre/Post-Treatment    Pain: FACES Scale, Pretreatment 0-->no hurt  -MG        Posttreatment Pain Rating 0-->no hurt  -MG           Oral Motor Structure and Function    Dentition Assessment upper dentures/partial in place  has lower partial but not in place and not available  -MG        Secretion Management WNL/WFL  -MG        Mucosal Quality moist, healthy  -MG        Volitional Swallow delayed  -MG        Volitional Cough WFL  -MG           Oral Musculature and Cranial Nerve Assessment    Oral Motor General Assessment oral labial or buccal  impairment  -MG        Oral Labial or Buccal Impairment, Detail, Cranial Nerve VII (Facial): CN7: Motor Impairment;left labial droop  -MG           General Eating/Swallowing Observations    Respiratory Support Currently in Use room air  -MG        Eating/Swallowing Skills fed by SLP  -MG        Positioning During Eating upright in bed  -MG        Utensils Used spoon;straw  -MG        Consistencies Trialed pureed;honey-thick liquids;nectar/syrup-thick liquids;thin liquids;regular textures  -MG           Clinical Swallow Eval    Oral Prep Phase impaired  -MG        Oral Transit impaired  -MG        Oral Residue WFL  -MG        Pharyngeal Phase suspected pharyngeal impairment  -MG        Esophageal Phase unremarkable  -MG        Clinical Swallow Evaluation Summary See note.  -MG           Oral Prep Concerns    Oral Prep Concerns prolonged mastication  -MG        Prolonged Mastication regular consistencies  -MG           Oral Transit Concerns    Oral Transit Concerns increased oral transit time  -MG        Increased Oral Transit Time thin;nectar  -MG           Pharyngeal Phase Concerns    Pharyngeal Phase Concerns cough  -MG        Cough thin;nectar  -MG           SLP Evaluation Clinical Impression    SLP Swallowing Diagnosis mild-moderate;oral dysphagia;suspected pharyngeal dysphagia  -MG        Functional Impact risk of aspiration/pneumonia;risk of malnutrition;risk of dehydration  -MG        Rehab Potential/Prognosis, Swallowing good, to achieve stated therapy goals  -MG        Swallow Criteria for Skilled Therapeutic Interventions Met demonstrates skilled criteria  -MG           Recommendations    Therapy Frequency (Swallow) at least;3 days per week  -MG        Predicted Duration Therapy Intervention (Days) until discharge  -MG        SLP Diet Recommendation soft to chew textures;ground;honey thick liquids;water between meals after oral care, with supervision;ice chips between meals after oral care, with supervision   -MG        Recommended Diagnostics VFSS (MBS);SLE/Cog/Motor Speech Evaluation  with any increasing concerns  -MG        Recommended Precautions and Strategies upright posture during/after eating;small bites of food and sips of liquid;general aspiration precautions;fatigue precautions;1:1 supervision;assist with feeding  -MG        Oral Care Recommendations Oral Care before breakfast, after meals and PRN;Toothbrush  -MG        SLP Rec. for Method of Medication Administration meds whole;with puree;as tolerated  -MG        Monitor for Signs of Aspiration yes;notify SLP if any concerns  -MG        Anticipated Discharge Disposition (SLP) unknown  -MG           Swallow Goals (SLP)    Swallow LTGs Swallow Long Term Goal (free text)  -MG        Swallow STGs diet tolerance goal selection (SLP);labial strengthening goal selection (SLP);pharyngeal strengthening exercise goal selection (SLP)  -MG        Diet Tolerance Goal Selection (SLP) Patient will tolerate trials of  -MG        Labial Strengthening Goal Selection (SLP) labial strengthening, SLP goal 1  -MG        Pharyngeal Strengthening Exercise Goal Selection (SLP) pharyngeal strengthening exercise, SLP goal 1  -MG           (LTG) Swallow    (LTG) Swallow Patient will tolerate least restrictive diet without s/s of aspiration.  -MG        [place] (Swallow Long Term Goal) independently (over 90% accuracy)  -MG        Time Frame (Swallow Long Term Goal) by discharge  -MG        Barriers (Swallow Long Term Goal) none  -MG        Progress/Outcomes (Swallow Long Term Goal) new goal  -MG           (STG) Patient will tolerate trials of    Consistencies Trialed (Tolerate trials) soft to chew (ground) textures;honey/ moderately thick liquids;nectar/ mildly thick liquids;thin liquids;regular textures  -MG        Desired Outcome (Tolerate trials) without signs/symptoms of aspiration;without signs of distress;with adequate oral prep/transit/clearance  -MG        [place]  (Tolerate trials) independently (over 90% accuracy)  -MG        Time Frame (Tolerate trials) by discharge  -MG        Progress/Outcomes (Tolerate trials) new goal  -MG           (STG) Labial Strengthening Goal 1 (SLP)    Activity (Labial Strengthening Goal 1, SLP) increase labial tone  -MG        Increase Labial Tone labial resistance exercises;swallow trials  -MG        [place]/Accuracy (Labial Strengthening Goal 1, SLP) independently (over 90% accuracy)  -MG        Time Frame (Labial Strengthening Goal 1, SLP) by discharge  -MG        Barriers (Labial Strengthening Goal 1, SLP) none  -MG        Progress/Outcomes (Labial Strengthening Goal 1, SLP) new goal  -MG           (STG) Pharyngeal Strengthening Exercise Goal 1 (SLP)    Activity (Pharyngeal Strengthening Goal 1, SLP) increase timing  -MG        Increase Timing gustatory stimulation (sour/cold)  -MG        [place]/Accuracy (Pharyngeal Strengthening Goal 1, SLP) independently (over 90% accuracy)  -MG        Time Frame (Pharyngeal Strengthening Goal 1, SLP) by discharge  -MG        Barriers (Pharyngeal Strengthening Goal 1, SLP) none  -MG        Progress/Outcomes (Pharyngeal Strengthening Goal 1, SLP) new goal  -MG                  User Key  (r) = Recorded By, (t) = Taken By, (c) = Cosigned By      Initials Name Effective Dates    MG Milagro Maynard, MS [facility]-SLP 07/11/23 -                     EDUCATION  The patient has been educated in the following areas:   Dysphagia (Swallowing Impairment) Oral Care/Hydration Modified Diet Instruction.        SLP GOALS       Row Name 08/11/23 0720             (LTG) Swallow    (LTG) Swallow Patient will tolerate least restrictive diet without s/s of aspiration.  -MG      [place] (Swallow Long Term Goal) independently (over 90% accuracy)  -MG      Time Frame (Swallow Long Term Goal) by discharge  -MG      Barriers (Swallow Long Term Goal) none  -MG      Progress/Outcomes (Swallow Long Term Goal) new goal  -MG          (STG) Patient will tolerate trials of    Consistencies Trialed (Tolerate trials) soft to chew (ground) textures;honey/ moderately thick liquids;nectar/ mildly thick liquids;thin liquids;regular textures  -MG      Desired Outcome (Tolerate trials) without signs/symptoms of aspiration;without signs of distress;with adequate oral prep/transit/clearance  -MG      [place] (Tolerate trials) independently (over 90% accuracy)  -MG      Time Frame (Tolerate trials) by discharge  -MG      Progress/Outcomes (Tolerate trials) new goal  -MG         (STG) Labial Strengthening Goal 1 (SLP)    Activity (Labial Strengthening Goal 1, SLP) increase labial tone  -MG      Increase Labial Tone labial resistance exercises;swallow trials  -MG      [place]/Accuracy (Labial Strengthening Goal 1, SLP) independently (over 90% accuracy)  -MG      Time Frame (Labial Strengthening Goal 1, SLP) by discharge  -MG      Barriers (Labial Strengthening Goal 1, SLP) none  -MG      Progress/Outcomes (Labial Strengthening Goal 1, SLP) new goal  -MG         (STG) Pharyngeal Strengthening Exercise Goal 1 (SLP)    Activity (Pharyngeal Strengthening Goal 1, SLP) increase timing  -MG      Increase Timing gustatory stimulation (sour/cold)  -MG      [place]/Accuracy (Pharyngeal Strengthening Goal 1, SLP) independently (over 90% accuracy)  -MG      Time Frame (Pharyngeal Strengthening Goal 1, SLP) by discharge  -MG      Barriers (Pharyngeal Strengthening Goal 1, SLP) none  -MG      Progress/Outcomes (Pharyngeal Strengthening Goal 1, SLP) new goal  -MG                User Key  (r) = Recorded By, (t) = Taken By, (c) = Cosigned By      Initials Name Provider Type    MG Milagro Maynard MS CCC-SLP Speech and Language Pathologist                       Time Calculation:    Time Calculation- SLP       Row Name 08/11/23 0841             Time Calculation- SLP    SLP Start Time 0720  -MG      SLP Stop Time 0831  -MG      SLP Time Calculation (min)  71 min  -MG      SLP Received On 08/11/23  -MG      SLP Goal Re-Cert Due Date 08/21/23  -MG         Untimed Charges    SLP Eval/Re-eval  ST Eval Oral Pharyng Swallow - 16986  -MG      [id number] Eval Oral Pharyng Swallow Minutes 71  -MG         Total Minutes    Untimed Charges Total Minutes 71  -MG       Total Minutes 71  -MG                User Key  (r) = Recorded By, (t) = Taken By, (c) = Cosigned By      Initials Name Provider Type    MG Milagro Maynard, MS CCC-SLP Speech and Language Pathologist                    Therapy Charges for Today       Code Description Service Date Service Provider Modifiers Qty    [id number] HC ST EVAL ORAL PHARYNG SWALLOW 5 8/11/2023 Milagro Maynard, MS CCC-SLP GN 1                 Milagro Maynard MS CCC-SLP  8/11/2023

## 2023-08-11 NOTE — PROGRESS NOTES
Neurology note:    Radiology over-read the CTA from last night and disagreed with the stat rad read.  They believe that there is a M2 occlusion on the right.  When I review the scan again, there are not obvious proximal MCA occlusions.  I do agree that there may be a branch occlusion after the trifurcation.  The patient has improved overnight post TNK and now has improved strength in her arm and leg.  Her dysarthria has improved as well.  Given these findings, I do not believe she is a good candidate for interventional stent placement.    Electronically signed by Tripp Kelley MD, 08/11/23, 7:49 AM CDT.
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
## 2023-08-11 NOTE — ED PROVIDER NOTES
Subjective   History of Present Illness  Patient presents due to concern for stroke.  She was last seen normal at 9 PM per EMS report.  Her family found her confused and weak about 30 minutes prior to arrival.  No history of stroke.  No history diabetes paravertebrals or injuries.  No history of brain surgeries.  Does not take any blood thinners.  The history otherwise limited with the patient's mental status and the fact that EMS has been information.  EMS reports left-sided weakness.    Review of Systems   Unable to perform ROS: Acuity of condition     Past Medical History:   Diagnosis Date    Broken femur     Chronic combined systolic (congestive) and diastolic (congestive) heart failure     Fracture of hip     left, due to lymphoma, treated non-operatively    Hypertension     Lymphoma     Presence of automatic cardioverter/defibrillator (AICD)        Allergies   Allergen Reactions    Phenergan [Promethazine Hcl]     Promethazine Unknown - High Severity       Past Surgical History:   Procedure Laterality Date    CARDIAC ELECTROPHYSIOLOGY PROCEDURE N/A 3/2/2022    Procedure: ICD DC new;  Surgeon: Austin Gordillo MD;  Location:  [facility];  Service: Cardiology;  Laterality: N/A;    CHOLECYSTECTOMY      HERNIA REPAIR      HIP TROCHANTERIC NAILING WITH INTRAMEDULLARY HIP SCREW Left 2/2/2017    Procedure: HIP TROCANTERIC NAILING LONG WITH INTRAMEDULLARY HIP SCREW WITH CAST APPLICATION TO LEFT HAND;  Surgeon: Beni Culp MD;  Location:  [facility] OR;  Service:        Family History   Problem Relation Age of Onset    Heart failure Mother     Liver disease Father     Heart disease Sister        Social History     Socioeconomic History    Marital status: Single   Tobacco Use    Smoking status: Former    Smokeless tobacco: Never    Tobacco comments:     quit 55 years ago   Vaping Use    Vaping Use: Never used   Substance and Sexual Activity    Alcohol use: No    Drug use: No    Sexual  activity: Not Currently           Objective   Physical Exam  Vitals reviewed.   Constitutional:       General: She is not in acute distress.  HENT:      Head: Normocephalic and atraumatic.   Eyes:      Extraocular Movements: Extraocular movements intact.      Conjunctiva/sclera: Conjunctivae normal.   Cardiovascular:      Pulses: Normal pulses.      Heart sounds: Normal heart sounds.   Pulmonary:      Effort: Pulmonary effort is normal. No respiratory distress.      Breath sounds: No wheezing.   Abdominal:      General: Abdomen is flat. There is no distension.      Tenderness: There is no abdominal tenderness. There is no guarding.   Musculoskeletal:         General: No swelling or tenderness.      Cervical back: Normal range of motion and neck supple.      Right lower leg: No edema.      Left lower leg: No edema.   Skin:     General: Skin is warm and dry.   Neurological:      Mental Status: She is alert.      Comments: Right upper extremity: 5/5 strength with handgrip and flexion/extension of shoulders, elbows.   Light touch sensation intact and equal when compared to the left upper extremity.    Left upper extremity: no effort against gravity, limb hits bed  Pt cannot feel light touch sensation on the left    Right lower extremity: effort against gravity but limb hits bed  Light touch sensation intact when compared to the left lower extremity.    Left lower extremity: no effort against gravity  Light touch sensation reported intact and equal when compared to the right lower extremity.    Light sensation intact in bilateral face. Cdoesn't comply with FNF. Some inattenteiveness no hemineglect.    Psychiatric:         Behavior: Behavior normal.         Thought Content: Thought content normal.       Procedures           ED Course  ED Course as of 08/11/23 1827   Thu Aug 10, 2023   2255 NIHSS 12 [AS]   2304 STAT rad states no ICH; has not read vessels yet [AS]   2329 Tn elevated; ECG with lateral TWI and inferior TWI  no ST deviation and pt has no CP. Tn 340; likely a cerebral Tn elevation, but I will reassess her CP. She is getting lytics per the RN.  [AS]   Fri Aug 11, 2023   0721 Radiology just called regarding discrepancy on the patient CT angio which revealed M2 occlusion on the right side Dr. Benjamin the attending neurologist for this patient was informed. [TS]      ED Course User Index  [AS] Sven Nunez MD  [TS] Ron Ortiz MD                                           Medical Decision Making  Problems Addressed:  Cerebrovascular accident (CVA), unspecified mechanism: complicated acute illness or injury    Amount and/or Complexity of Data Reviewed  Labs: ordered.  Radiology: ordered.  ECG/medicine tests: ordered.    Risk  Prescription drug management.  Decision regarding hospitalization.      Mitzi Villafuerte is a 77 y.o. female with PMH above who presents to the Emergency Department with strokelike sx. Differential diagnosis includes ICH, intracranial mass, edema.  No fever or infectious symptoms to suggest SBI.  She has focal neurologic deficits which is less indicative of another process for her altered mental status.  See ED course and pertinent workup; she had a perfusion deficit on her perfusion scan so Dr. Kelley elected to give her TNK after discussion with her.  Troponin was elevated however this is likely cerebral in etiology as her EKG shows inferior and lateral to inversions without any signs of a focal single lesion. CXR without focal emergent process on my review. I did discuss her case with Dr. Gomez who endorses that there would be no emergent cardiac management tonight given that she does not have ST elevation and has already received lytics and aspirin.  Dr. Kelley recommended hospitalist admission due to the multimodal problems so she was admitted to Dr. Greer for further management.       Final diagnosis: stroke    All questions answered. Patient/family was understanding and in  agreement with today's assessment and plan. The patient was monitored during their stay in the ED and dispositioned without acute event.    Electronically signed by:  Sven Nunez MD 8/11/2023 18:27 CDT      Note: Dragon medical dictation software was used in the creation of this note.      Final diagnoses:   Cerebrovascular accident (CVA), unspecified mechanism       ED Disposition  ED Disposition       ED Disposition   Decision to Admit    Condition   --    Comment   Level of Care: Critical Care [6]   Diagnosis: Stroke [071803]   Admitting Physician: VALENTIN TATE [[id number]]   Attending Physician: VALENTIN TATE [[id number]]   Certification: I Certify That Inpatient Hospital Services Are Medically Necessary For Greater Than 2 Midnights                 No follow-up provider specified.       Medication List        ASK your doctor about these medications      metoprolol succinate XL 50 MG 24 hr tablet  Commonly known as: TOPROL-XL  Ask about: Which instructions should I use?                 Sven Nunez MD  08/11/23 0226       Sven Nunez MD  08/11/23 1828

## 2023-08-11 NOTE — PLAN OF CARE
Goal Outcome Evaluation:              Outcome Evaluation: NTN consult. Pt screen reduced oral intake over the last month. Visited pt at bedside. Unable to assess as pt fell asleep during consult. Pt is currently NPOx2. Per weight report, weight has decreased from 106 lbs. to 96.58 lbs. since 5/11/23 (8.9% weight change). Weight changes significant. Unable to perform NFPE at this time. Indicators of malnutrition observed. Pt has severe malnutrition in the context of chronic illness. View progress note for details. When diet advanced, recommend adding Boost Plus TID to help meet pt's estimated calorie-protein needs for weight maintenance/gain and cardiac modifier to diet order. SLP is following. Will continue to follow/monitor intake when diet advanced.

## 2023-08-11 NOTE — ED PROVIDER NOTES
Subjective   History of Present Illness    Review of Systems    Past Medical History:   Diagnosis Date    Broken femur     Chronic combined systolic (congestive) and diastolic (congestive) heart failure     Fracture of hip     left, due to lymphoma, treated non-operatively    Hypertension     Lymphoma     Presence of automatic cardioverter/defibrillator (AICD)        Allergies   Allergen Reactions    Phenergan [Promethazine Hcl]     Promethazine Unknown - High Severity       Past Surgical History:   Procedure Laterality Date    CARDIAC ELECTROPHYSIOLOGY PROCEDURE N/A 3/2/2022    Procedure: ICD DC new;  Surgeon: Austin Gordillo MD;  Location:  [facility];  Service: Cardiology;  Laterality: N/A;    CHOLECYSTECTOMY      HERNIA REPAIR      HIP TROCHANTERIC NAILING WITH INTRAMEDULLARY HIP SCREW Left 2/2/2017    Procedure: HIP TROCANTERIC NAILING LONG WITH INTRAMEDULLARY HIP SCREW WITH CAST APPLICATION TO LEFT HAND;  Surgeon: Beni Culp MD;  Location:  [facility] OR;  Service:        Family History   Problem Relation Age of Onset    Heart failure Mother     Liver disease Father     Heart disease Sister        Social History     Socioeconomic History    Marital status: Single   Tobacco Use    Smoking status: Former    Smokeless tobacco: Never    Tobacco comments:     quit 55 years ago   Vaping Use    Vaping Use: Never used   Substance and Sexual Activity    Alcohol use: No    Drug use: No    Sexual activity: Not Currently           Objective   Physical Exam    Procedures           ED Course  ED Course as of 08/11/23 0724   Thu Aug 10, 2023   2255 NIHSS 12 [AS]   2304 STAT rad states no ICH; has not read vessels yet [AS]   2329 Tn elevated; ECG with lateral TWI and inferior TWI no ST deviation and pt has no CP. Tn 340; likely a cerebral Tn elevation, but I will reassess her CP. She is getting lytics per the RN.  [AS]   Fri Aug 11, 2023   0721 Radiology just called regarding discrepancy on the  patient CT angio which revealed M2 occlusion on the right side Dr. Benjamin the attending neurologist for this patient was informed. [TS]      ED Course User Index  [AS] Sven Nunez MD  [TS] Ron Ortiz MD                                           Medical Decision Making  Problems Addressed:  Cerebrovascular accident (CVA), unspecified mechanism: complicated acute illness or injury    Amount and/or Complexity of Data Reviewed  Labs: ordered.  Radiology: ordered.  ECG/medicine tests: ordered.    Risk  Prescription drug management.  Decision regarding hospitalization.        Final diagnoses:   Cerebrovascular accident (CVA), unspecified mechanism       ED Disposition  ED Disposition       ED Disposition   Decision to Admit    Condition   --    Comment   Level of Care: Critical Care [6]   Diagnosis: Stroke [221814]   Admitting Physician: VALENTIN TATE [[id number]]   Attending Physician: VALENTIN TATE [[id number]]   Certification: I Certify That Inpatient Hospital Services Are Medically Necessary For Greater Than 2 Midnights                 No follow-up provider specified.       Medication List      No changes were made to your prescriptions during this visit.            Ron Ortiz MD  08/11/23 0713

## 2023-08-11 NOTE — CASE MANAGEMENT/SOCIAL WORK
Continued Stay Note  [facility]     Patient Name: Mitzi Villafuerte  MRN: [id number]  Today's Date: 8/11/2023    Admit Date: 8/10/2023        Discharge Plan       Row Name 08/11/23 1511       Plan    Plan Comments Unable to screen patient.  Staff was working with patient.  Will screen at another time.                   Discharge Codes    No documentation.                       FAHAD May

## 2023-08-11 NOTE — PROGRESS NOTES
Neurology Progress Note      Patient:Mitzi Villafuerte   MRN: [id number]  : 1945    Reason for Consult: L sided weakness  Length of Stay:  0   Subjective:   Patient remains in the ICU. Very lethargic, will not open eyes but intermittently follow commands. Patient noted to have blood per rectum. Son bedside, all questions answered.    Current Medications:  Current Facility-Administered Medications   Medication Dose Route Frequency Provider Last Rate Last Admin    acetaminophen (TYLENOL) tablet 650 mg  650 mg Oral Q6H PRN Babar Villafuerte MD   650 mg at 23 1342    aspirin tablet 325 mg  325 mg Oral Daily Tripp Kelley MD        Or    aspirin suppository 300 mg  300 mg Rectal Daily Tripp Kelley MD        atorvastatin (LIPITOR) tablet 80 mg  80 mg Oral Nightly Tripp Kelley MD        LORazepam (ATIVAN) injection 1 mg  1 mg Intravenous Once PRN Babar Villafuerte MD        niCARdipine (CARDENE) 25 mg in 250 mL NS infusion  5-15 mg/hr Intravenous Titrated Tripp Kelley MD   Held at 23 0700     Physical Examination:   Vital Signs:  Vitals:    23 1200 23 1400 23 1500 23 1600   BP: 123/49 107/75 121/57 132/67   BP Location: Left arm Left arm Left arm    Patient Position: Lying Lying Lying    Pulse: 96 80 95 (!) 121   Resp:   23    Temp: 99 øF (37.2 øC)      TempSrc: Oral      SpO2: 98% 99% 100% 100%   Weight:       Height:           General Examination:  Constitutional:  Well-developed/Well norished.  Eyes:   No scleral icterus.  HENT:   Normocephalic/atraumatic.   Neck/Back:  Supple w/ full range of motion.  Cardiovascular:  Tachycardic, regular rhythm.   Respiratory:   No stridor, no respiratory distress.  Gastrointestinal:  Soft. No distension or tenderness.  Musculoskeletal:  No edema or tenderness.    Skin:    Warm and dry. No rash noted.   Psychiatric:   Insight adequate, mood calm.     Neurological Examination:  Mental status:   Lethargic.   Intermittently follows simple commands .  Language/speech: Mildly dysarthric. Comprehension intact.   Cranial nerves:  Will not open eyes .      R gaze preference, but can cross midline .      Facial symmetry.     (+) Gag/Cough.  Motor system:  Muscle tone and appearance are normal. No contractures.     RUE: Antigravity w/ drift.     LUE: No effort against gravity.     RLE: Antigravity w/o drift.     LLE: Some movement against gravity.  Sensory:   Withdraws to pain in all extremities.  Coordination:   No tremor or abnormal movements.  Gait:    Unsafe to ambulate  Recent Diagnostics:   Laboratory Results:   - Reviewed in EMR    Imaging Results:  Imaging reviewed personally in EMR/RAPID AI.    CT BRAIN - no acute intracranial abnormalities.  CTA HEAD/NECK- possible distal RM2 LVO  MRI BRAIN - Pending    Results for orders placed during the hospital encounter of 08/10/23    Adult Transthoracic Echo Complete W/ Cont if Necessary Per Protocol (With Agitated Saline)    Interpretation Summary    Left ventricular systolic function is moderately decreased. Estimated left ventricular EF = 40%    The following left ventricular wall segments are hypokinetic: basal anterolateral, mid anterolateral, apical lateral, basal inferolateral, mid inferolateral, apical septal, basal inferoseptal, mid inferoseptal, apex hypokinetic, mid anteroseptal, basal inferior and basal inferoseptal. The following left ventricular wall segments are akinetic: apical inferior and mid inferior.    Left ventricular diastolic function is consistent with (grade Ia w/high LAP) impaired relaxation.    Estimated right ventricular systolic pressure from tricuspid regurgitation is normal (<35 mmHg).    Normal size and function right ventricle.    No significant (greater than mild) valvular pathology.    Saline test results are negative.    Compared to prior exam from 5/4/2023, LV systolic function has improved slightly.    Last nurse assessment:  Interval:   (shift change)  1a. Level of Consciousness: 1-->Not alert, but arousable by minor stimulation to obey, answer, or respond  1b. LOC Questions: 0-->Answers both questions correctly  1c. LOC Commands: 0-->Performs both tasks correctly  2. Best Gaze: 2-->Forced deviation, or total gaze paresis not overcome by the oculocephalic maneuver  3. Visual: 1-->Partial hemianopia  4. Facial Palsy: 2-->Partial paralysis (total or near-total paralysis of lower face)  5a. Motor Arm, Left: 0-->No drift, limb holds 90 (or 45) degrees for full 10 secs  5b. Motor Arm, Right: 0-->No drift, limb holds 90 (or 45) degrees for full 10 secs  6a. Motor Leg, Left: 2-->Some effort against gravity, leg falls to bed by 5 secs, but has some effort against gravity  6b. Motor Leg, Right: 0-->No drift, leg holds 30 degree position for full 5 secs  7. Limb Ataxia: 1-->Present in one limb  8. Sensory: 1-->Mild-to-moderate sensory loss, patient feels pinprick is less sharp or is dull on the affected side, or there is a loss of superficial pain with pinprick, but patient is aware of being touched  9. Best Language: 0-->No aphasia, normal  10. Dysarthria: 1-->Mild-to-moderate dysarthria, patient slurs at least some words and, at worst, can be understood with some difficulty  11. Extinction and Inattention (formerly Neglect): 1-->Visual, tactile, auditory, spatial, or personal inattention or extinction to bilateral simultaneous stimulation in one of the sensory modalities  Total (NIH Stroke Scale): 12  Assessment & Plan:   Patient is a 77 y.o. woman with multiple chronic medical problems including HTN, HLD, Afib not on AC, cardiomyopathy, CHFrEF s/p AICD, chronic back pain and malignant lymphoma who presented w/ acute onset L sided weakness and was treated with TNK. Stroke etiology likely cardioembolic given patient is not anticoagulated and has Afib. LDL 64, HbA1c 4.5. Repeat H&H 8.2 and 29.3. Hb is trending down.    Impression:  1) Acute ischemic stroke  s/p TNK  2) HTN  3) HLD  4) Afib, not on AC  5) Blood per rectum  6) Chronic pain      PLAN:   - Post TNK protocol for 24hrs:  - No Aspirin or Plavix  - No Lovenox or Anticoagulation   - No NSAIDs  - Maintain BP strictly below 180/105 mm of Hg, recommend a range of  120-140 SBP, avoid hypotension.  - CT Brain w/o contrast between 22-26 hours post tNK bolus to rule out hemorrhagic transformation.   - Repeat H&H in AM  - Recommend GI consult for new blood per rectum  - MRI brain, to assess for degree of ischemia, pending  - Continue taking atorvastatin 20mg for cholesterol reduction with a LDL goal of < 70.  - Hold antiplatelet and AC in setting of possible GI bleed    Ofelia Benjamin MD  08/11/23  16:46 CDT

## 2023-08-12 NOTE — PROGRESS NOTES
[facility] Medicine Services  INPATIENT PROGRESS NOTE    Patient Name: Mitzi Villafuerte  Date of Admission: 8/10/2023  Today's Date: 08/12/23  Length of Stay: 1  Primary Care Physician: Urban Main MD    Subjective   Chief Complaint: follow up stroke  HPI   Patient still not terribly responsive  Had a neuro change and has been less responsive than before  Continues to have rectal bleeding    Neurology rechecked a Troponin and it is now over 1100.      WBC count is elevated.  Not sure this represents infection.  Some of this is likely a stress response.  I would also question if some of this represents hemoconcentration.           Review of Systems   Unable to perform ROS: Mental status change        All pertinent negatives and positives are as above. All other systems have been reviewed and are negative unless otherwise stated.     Objective    Temp:  [98.9 øF (37.2 øC)-99 øF (37.2 øC)] 98.9 øF (37.2 øC)  Heart Rate:  [] 103  Resp:  [22-26] 26  BP: ()/(49-89) 123/63  Physical Exam  Constitutional:       Appearance: Normal appearance. She is well-developed.   HENT:      Head: Normocephalic and atraumatic.      Right Ear: Tympanic membrane, ear canal and external ear normal.      Left Ear: Tympanic membrane, ear canal and external ear normal.      Nose: Nose normal.      Mouth/Throat:      Mouth: Mucous membranes are moist.      Pharynx: Oropharynx is clear.   Eyes:      Extraocular Movements: Extraocular movements intact.      Conjunctiva/sclera: Conjunctivae normal.      Pupils: Pupils are equal, round, and reactive to light.   Cardiovascular:      Rate and Rhythm: Normal rate and regular rhythm.      Heart sounds: Normal heart sounds.   Pulmonary:      Effort: Pulmonary effort is normal.      Breath sounds: Normal breath sounds.   Abdominal:      General: Bowel sounds are normal. There is no distension.      Palpations: Abdomen is soft.      Tenderness: There  is no abdominal tenderness.   Musculoskeletal:         General: Normal range of motion.      Cervical back: Normal range of motion and neck supple.   Skin:     General: Skin is warm and dry.   Neurological:      Mental Status: She is lethargic.           Results Review:  I have reviewed the labs, radiology results, and diagnostic studies.    Laboratory Data:   Results from last 7 days   Lab Units 08/12/23  0932 08/12/23  0530 08/11/23  1812 08/10/23  2250 08/10/23  1229   WBC 10*3/mm3 21.59*  --   --  11.97* 13.82*   HEMOGLOBIN g/dL 8.0* 8.1* 8.2* 8.7* 9.2*   HEMATOCRIT % 27.0* 28.7* 29.3* 30.9* 31.8*   PLATELETS 10*3/mm3 286  --   --  275 321        Results from last 7 days   Lab Units 08/12/23  0932 08/10/23  2250 08/10/23  1229   SODIUM mmol/L 139 139 136   POTASSIUM mmol/L 3.6 3.0* 3.2*   CHLORIDE mmol/L 104 102 98   CO2 mmol/L 18.0* 26.0 27.0   BUN mg/dL 15 16 17   CREATININE mg/dL 0.45* 0.60 0.62   CALCIUM mg/dL 8.3* 8.4* 9.1   BILIRUBIN mg/dL 0.4 0.3 0.3   ALK PHOS U/L 130* 118* 130*   ALT (SGPT) U/L 20 12 11   AST (SGOT) U/L 116* 37* 28   GLUCOSE mg/dL 146* 111* 137*       Culture Data:   No results found for: BLOODCX, URINECX, WOUNDCX, MRSACX, RESPCX, STOOLCX    Radiology Data:   Imaging Results (Last 24 Hours)       Procedure Component Value Units Date/Time    XR Chest 1 View [[id number]] Resulted: 08/12/23 1122     Updated: 08/12/23 1122    CT Head Without Contrast [[id number]] Collected: 08/12/23 0836     Updated: 08/12/23 0844    Narrative:      EXAM/TECHNIQUE: CT HEAD WO CONTRAST-     INDICATION: Neuro decline; I63.9-Cerebral infarction, unspecified;  R13.12-Dysphagia, oropharyngeal phase     COMPARISON: Prior day head CT     DLP: 532 mGy cm. Automated exposure control was also utilized to  decrease patient radiation dose.     FINDINGS:     Continued progression of low-density change in the RIGHT MCA frontal  lobe distribution with gray-white differentiation loss, compatible with  continued evolution of  acute infarct. Mild surrounding edema is present.  No significant midline shift. No acute intracranial hemorrhage. Lateral  ventricles are nondilated. Basilar cisterns are patent. No acute orbital  finding. Mastoid air cells are clear. Visualized paranasal sinuses are  clear. No acute osseous finding.       Impression:         Continued evolution of RIGHT MCA acute infarct with continued  progression of hypodense parenchymal change in the RIGHT anterior  frontal lobe and slight increase in surrounding edema. No midline shift.  No evidence of intracranial hemorrhage.  This report was finalized on 08/12/2023 08:41 by Dr. Collin Gordillo MD.    CT Head Without Contrast [[id number]] Collected: 08/12/23 0505     Updated: 08/12/23 0511    Narrative:      EXAM/TECHNIQUE: CT HEAD WO CONTRAST-     INDICATION: Stroke, follow up     COMPARISON: 08/10/2023     DLP: 683 mGy cm. Automated exposure control was also utilized to  decrease patient radiation dose.     FINDINGS:     New areas of hypoattenuation with gray-white differentiation loss in the  RIGHT anterior frontal lobe, correlating with areas of ischemia  identified on prior brain CT perfusion scan, compatible with acute  infarct. No left-sided gray-white differentiation loss.     No acute intracranial hemorrhage. No midline shift or significant mass  effect. Lateral ventricles are nondilated. Basilar cisterns are patent.  No acute orbital finding. Mastoid air cells and visualized portion of  the paranasal sinuses are clear. No acute osseous finding.       Impression:         1.  Evolving acute RIGHT MCA infarct, correlating with area of concern  on prior CT brain perfusion.  2.  No acute intracranial hemorrhage.     These findings are in agreement with the critical and emergent findings  from the [facility] preliminary report.  This report was finalized on 08/12/2023 05:08 by Dr. Collin Gordillo MD.            I have reviewed the patient's current medications.      Assessment/Plan   Assessment  Active Hospital Problems    Diagnosis     **Stroke     PAF (paroxysmal atrial fibrillation)     Severe malnutrition     AICD (automatic cardioverter/defibrillator) present     Cardiomyopathy     Primary hypertension     Chronic pain disorder     Chronic pain due to neoplasm        Treatment Plan  1.  Stroke s/p TNK  -Statin  -No ASA/AC given bleeding  -Neuro following    2.  GI Bleeding  -GI consulted  -Protonix  -Transfuse as indicated    3.  Type II troponin elevation due to nonischemic myocardial injury  Clinically not sure how significant this is.  While it's elevated, she hadn't previously complained of chest pain.  She has been tachycardic and hypotensive at times.  She has GI Bleeding.  She multiple causes for a troponin elevation that aren't ACS.  Recent echo (yesterday) actually shows some improvement in her EF.  Doubt this represents a cardiac event.  Furthermore, she is not a candidate for cardiac catheterization at this time given her rectal bleeding.    -Neurology has placed Cardiology consultation      4.  HTN  -monitor    5.  A-fib  -monitor  -would not aggressively treat tachycardia as this is likely a response to her other issues    6.  Chronic Systolic CHF  -monitor for volume overload    7.  Leukocytosis  -CXR  -UA      Medical Decision Making  Number and Complexity of problems: 7 problems, high complexity--acute Stroke represents threat to function    Risk:  Moderate rx drug management    Differential Diagnosis: Stroke,     Conditions and Status        Condition is worsening.     MDM Data  1:  Tests/Documents/Independent Historians:  A:  Prior external notes from unique source reviewed:  B.  Review of the Results of Each Unique Test: (3) CBC, CMP, Troponin  C.  Assessment requiring an independent Historian:     2.  Independent interpretation of Tests:   A.  Radiology Interpretation: (1) CT head:  No ICH, Evolution of Right MCA stroke  B:  EKG/Telemetry  Interpretation: (1) Sinus Tach    3:  Discussion of management or Test interpretation      Care Planning  Shared decision making: family agreeable to treatment plan  Code status and discussions:     Disposition  Social Determinants of Health that impact treatment or disposition: n/a  I expect the patient to be discharged to rehab  in ? days.     Electronically signed by Babar Villafuerte MD, 08/12/23, 11:23 CDT.

## 2023-08-12 NOTE — CONSULTS
[facility] CONSULT NOTE    Referring Provider: Dr. Babar Villafuerte    Reason for Consultation: Elevated HS troponin    Chief complaint:   Chief Complaint   Patient presents with    Stroke         History of present illness:   Ms. Mitzi Villafuerte is a 77 y.o. female with PMHx HFrEF, dilated CMO - S/P AICD, HTN who presented to the ER with acute onset of left neglect, left facial droop, and left-sided weakness.     Work-up in the ER, her CT head was unremarkable. She was seen by Dr. Kelley, neurology, who recommended TNK. Patient and son agreed, therefore, TNK was given.     HS tropins were elevated at 340 with repeat 387, delta 47. Patient denied any chest pain or shortness of breath.     This morning, patient with decline in mental status; repeat CT head demonstrated evolving right MCA acute infarct with continued progression. Neurology ordered repeat HS troponin, which resulted 1174. Therefore, cardiology consult.     History  Past Medical History:   Diagnosis Date    Broken femur     Chronic combined systolic (congestive) and diastolic (congestive) heart failure     Fracture of hip     left, due to lymphoma, treated non-operatively    Hypertension     Lymphoma     Presence of automatic cardioverter/defibrillator (AICD)    ,   Past Surgical History:   Procedure Laterality Date    CARDIAC ELECTROPHYSIOLOGY PROCEDURE N/A 3/2/2022    Procedure: ICD DC new;  Surgeon: Austin Gordillo MD;  Location: [facility] CATH INVASIVE LOCATION;  Service: Cardiology;  Laterality: N/A;    CHOLECYSTECTOMY      HERNIA REPAIR      HIP TROCHANTERIC NAILING WITH INTRAMEDULLARY HIP SCREW Left 2/2/2017    Procedure: HIP TROCANTERIC NAILING LONG WITH INTRAMEDULLARY HIP SCREW WITH CAST APPLICATION TO LEFT HAND;  Surgeon: Beni Culp MD;  Location: [facility] OR;  Service:    ,   Family History   Problem Relation Age of Onset    Heart failure Mother     Liver disease Father     Heart disease Sister    ,   Social  "History     Tobacco Use    Smoking status: Former    Smokeless tobacco: Never    Tobacco comments:     quit 55 years ago   Vaping Use    Vaping Use: Never used   Substance Use Topics    Alcohol use: No    Drug use: No   ,     Medications  Current Facility-Administered Medications   Medication Dose Route Frequency Provider Last Rate Last Admin    acetaminophen (TYLENOL) tablet 650 mg  650 mg Oral Q6H PRN Babar Villafuerte MD   650 mg at 08/11/23 1342    atorvastatin (LIPITOR) tablet 40 mg  40 mg Oral Nightly Ofelia Benjamin MD   40 mg at 08/11/23 2007    [START ON 8/13/2023] Chlorhexidine Gluconate Cloth 2 % pads 1 application   1 application  Topical Q24H Babar Villafuerte MD        LORazepam (ATIVAN) injection 1 mg  1 mg Intravenous Once PRN Babar Villafuerte MD        mupirocin (BACTROBAN) 2 % nasal ointment 1 application   1 application  Each Nare BID Babar Villafuerte MD   1 application  at 08/12/23 1116    niCARdipine (CARDENE) 25 mg in 250 mL NS infusion  5-15 mg/hr Intravenous Titrated Tripp Kelley MD   Held at 08/11/23 0700    ondansetron (ZOFRAN) injection 4 mg  4 mg Intravenous Q6H PRN Babar Villafuerte MD   4 mg at 08/12/23 0716    pantoprazole (PROTONIX) injection 40 mg  40 mg Intravenous Q12H Babar Villafuerte MD   40 mg at 08/12/23 0854    piperacillin-tazobactam (ZOSYN) 3.375 g in iso-osmotic dextrose 50 ml (premix)  3.375 g Intravenous Q8H Ofelia Benjamin MD        sodium chloride 0.9 % infusion  75 mL/hr Intravenous Continuous Babar Villafuerte MD 75 mL/hr at 08/12/23 1112 75 mL/hr at 08/12/23 1112       Allergies:  Phenergan [promethazine hcl] and Promethazine    REVIEW OF SYSTEMS:  Unable to obtain as patient is non-responsive.         Objective     Physical Exam:    /76   Pulse 103   Temp 98.9 øF (37.2 øC) (Oral)   Resp 26   Ht 157.5 cm (62\")   Wt 41.4 kg (91 lb 4.8 oz)   LMP  (LMP Unknown)   SpO2 95%   BMI 16.70 kg/mý        General " Appearance:    Non-responsive, no acute distress   HEENT:    Normocephalic. Atraumatic. Right pupil pinpoint, very sluggish to react. Left pupil - 2mm, reactive.   Lungs:     Clear to auscultation, respirations regular, even and unlabored    Heart:    Regular rhythm and normal rate, normal S1 and S2, no murmur, no gallop, no rub, no click   Abdomen:     Normal bowel sounds, no masses, no organomegaly, soft, non-tender, non-distended, no guarding, no rebound tenderness   Extremities:   No edema, no cyanosis, no redness   Pulses:   Pulses palpable and equal bilaterally   Skin:   No bleeding, bruising or rash   Lymph nodes:   No palpable adenopathy   Neurologic:   No spontaneous movement. Right upper extremity with external posturing to painful stimuli. No response of LUE. Toes fan out with stroking of plantar surface of feet.              Results Review:   I reviewed the patient's new clinical results.    Lab Results (last 72 hours)       Procedure Component Value Units Date/Time    Urinalysis With Culture If Indicated - Straight Cath [[id number]]  (Abnormal) Collected: 08/12/23 1217    Specimen: Urine from Straight Cath Updated: 08/12/23 1238     Color, UA Yellow     Appearance, UA Turbid     pH, UA <=5.0     Specific Gravity, UA >1.030     Glucose, UA >=1000 mg/dL (3+)     Ketones, UA 40 mg/dL (2+)     Bilirubin, UA Negative     Blood, UA Moderate (2+)     Protein, UA 30 mg/dL (1+)     Leuk Esterase, UA Negative     Nitrite, UA Negative     Urobilinogen, UA 0.2 E.U./dL    Narrative:      In absence of clinical symptoms, the presence of pyuria, bacteria, and/or nitrites on the urinalysis result does not correlate with infection.    Urinalysis, Microscopic Only - Straight Cath [[id number]]  (Abnormal) Collected: 08/12/23 1217    Specimen: Urine from Straight Cath Updated: 08/12/23 1238     RBC, UA 13-20 /HPF      WBC, UA 0-2 /HPF      Comment: Urine culture not indicated.        Bacteria, UA 4+ /HPF      Squamous  "Epithelial Cells, UA 0-2 /HPF      Hyaline Casts, UA 0-2 /LPF      Granular Casts, UA 0-2 /LPF      Methodology Manual Light Microscopy    Procalcitonin [[id number]]  (Normal) Collected: 08/12/23 0932    Specimen: Blood Updated: 08/12/23 1151     Procalcitonin 0.17 ng/mL     Narrative:      As a Marker for Sepsis (Non-Neonates):    1. <0.5 ng/mL represents a low risk of severe sepsis and/or septic shock.  2. >2 ng/mL represents a high risk of severe sepsis and/or septic shock.    As a Marker for Lower Respiratory Tract Infections that require antibiotic therapy:    PCT on Admission    Antibiotic Therapy       6-12 Hrs later    >0.5                Strongly Recommended  >0.25 - <0.5        Recommended   0.1 - 0.25          Discouraged              Remeasure/reassess PCT  <0.1                Strongly Discouraged     Remeasure/reassess PCT    As 28 day mortality risk marker: \"Change in Procalcitonin Result\" (>80% or <=80%) if Day 0 (or Day 1) and Day 4 values are available. Refer to [email or web address]    Change in PCT <=80%  A decrease of PCT levels below or equal to 80% defines a positive change in PCT test result representing a higher risk for 28-day all-cause mortality of patients diagnosed with severe sepsis for septic shock.    Change in PCT >80%  A decrease of PCT levels of more than 80% defines a negative change in PCT result representing a lower risk for 28-day all-cause mortality of patients diagnosed with severe sepsis or septic shock.       High Sensitivity Troponin T [[id number]]  (Abnormal) Collected: 08/12/23 0932    Specimen: Blood Updated: 08/12/23 1018     HS Troponin T 1,174 ng/L     Narrative:      High Sensitive Troponin T Reference Range:  <10.0 ng/L- Negative Female for AMI  <15.0 ng/L- Negative Male for AMI  >=10 - Abnormal Female indicating possible myocardial injury.  >=15 - Abnormal Male indicating possible myocardial injury.   Clinicians would have to utilize clinical acumen, " EKG, Troponin, and serial changes to determine if it is an Acute Myocardial Infarction or myocardial injury due to an underlying chronic condition.         Comprehensive Metabolic Panel [[id number]]  (Abnormal) Collected: 08/12/23 0932    Specimen: Blood Updated: 08/12/23 1016     Glucose 146 mg/dL      BUN 15 mg/dL      Creatinine 0.45 mg/dL      Sodium 139 mmol/L      Potassium 3.6 mmol/L      Chloride 104 mmol/L      CO2 18.0 mmol/L      Calcium 8.3 mg/dL      Total Protein 5.8 g/dL      Albumin 3.1 g/dL      ALT (SGPT) 20 U/L      AST (SGOT) 116 U/L      Alkaline Phosphatase 130 U/L      Total Bilirubin 0.4 mg/dL      Globulin 2.7 gm/dL      A/G Ratio 1.1 g/dL      BUN/Creatinine Ratio 33.3     Anion Gap 17.0 mmol/L      eGFR 99.2 mL/min/1.73     Narrative:      GFR Normal >60  Chronic Kidney Disease <60  Kidney Failure <15    The GFR formula is only valid for adults with stable renal function between ages 18 and 70.    CBC & Differential [[id number]]  (Abnormal) Collected: 08/12/23 0932    Specimen: Blood Updated: 08/12/23 0951    Narrative:      The following orders were created for panel order CBC & Differential.  Procedure                               Abnormality         Status                     ---------                               -----------         ------                     CBC Auto Differential[[id number]]        Abnormal            Final result                 Please view results for these tests on the individual orders.    CBC Auto Differential [[id number]]  (Abnormal) Collected: 08/12/23 0932    Specimen: Blood Updated: 08/12/23 0951     WBC 21.59 10*3/mm3      RBC 3.31 10*6/mm3      Hemoglobin 8.0 g/dL      Hematocrit 27.0 %      MCV 81.6 fL      MCH 24.2 pg      MCHC 29.6 g/dL      RDW 19.2 %      RDW-SD 57.3 fl      MPV 9.1 fL      Platelets 286 10*3/mm3      Neutrophil % 94.2 %      Lymphocyte % 1.9 %      Monocyte % 2.9 %      Eosinophil % 0.0 %      Basophil % 0.1 %      Neutrophils, Absolute  20.31 10*3/mm3      Lymphocytes, Absolute 0.42 10*3/mm3      Monocytes, Absolute 0.63 10*3/mm3      Eosinophils, Absolute 0.00 10*3/mm3      Basophils, Absolute 0.03 10*3/mm3     Blood Culture - Blood, Arm, Right [[id number]] Collected: 08/12/23 0932    Specimen: Blood from Arm, Right Updated: 08/12/23 0945    Blood Gas, Arterial - [[id number]]  (Abnormal) Collected: 08/12/23 0921    Specimen: Arterial Blood Updated: 08/12/23 0924     Site Left Brachial     Parth's Test Positive     pH, Arterial 7.476 pH units      Comment: 83 Value above reference range        pCO2, Arterial 29.9 mm Hg      Comment: 84 Value below reference range        pO2, Arterial 98.5 mm Hg      HCO3, Arterial 22.1 mmol/L      Base Excess, Arterial -1.1 mmol/L      Comment: 84 Value below reference range        O2 Saturation, Arterial 97.5 %      Temperature 37.0 C      Barometric Pressure for Blood Gas 748 mmHg      Modality Room Air     Ventilator Mode NA     Collected by [id number]     Comment: Meter: [id number]     :  [id number]        pCO2, Temperature Corrected 29.9 mm Hg      pH, Temp Corrected 7.476 pH Units      pO2, Temperature Corrected 98.5 mm Hg     Lipid Panel [[id number]] Collected: 08/12/23 0530    Specimen: Blood Updated: 08/12/23 0555     Total Cholesterol 124 mg/dL      Triglycerides 97 mg/dL      HDL Cholesterol 41 mg/dL      LDL Cholesterol  65 mg/dL      VLDL Cholesterol 18 mg/dL      LDL/HDL Ratio 1.55    Narrative:      Cholesterol Reference Ranges  (U.S. Department of Health and Human Services ATP III Classifications)    Desirable          <200 mg/dL  Borderline High    200-239 mg/dL  High Risk          >240 mg/dL      Triglyceride Reference Ranges  (U.S. Department of Health and Human Services ATP III Classifications)    Normal           <150 mg/dL  Borderline High  150-199 mg/dL  High             200-499 mg/dL  Very High        >500 mg/dL    HDL Reference Ranges  (U.S. Department of Health and Human Services ATP  III Classifications)    Low     <40 mg/dl (major risk factor for CHD)  High    >60 mg/dl ('negative' risk factor for CHD)        LDL Reference Ranges  (U.S. Department of Health and Human Services ATP III Classifications)    Optimal          <100 mg/dL  Near Optimal     100-129 mg/dL  Borderline High  130-159 mg/dL  High             160-189 mg/dL  Very High        >189 mg/dL    Hemoglobin & Hematocrit, Blood [[id number]]  (Abnormal) Collected: 08/12/23 0530    Specimen: Blood Updated: 08/12/23 0541     Hemoglobin 8.1 g/dL      Hematocrit 28.7 %     Hemoglobin & Hematocrit, Blood [[id number]]  (Abnormal) Collected: 08/11/23 1812    Specimen: Blood Updated: 08/11/23 1822     Hemoglobin 8.2 g/dL      Hematocrit 29.3 %     Hemoglobin A1c [[id number]]  (Abnormal) Collected: 08/10/23 2250    Specimen: Blood from Arm, Right Updated: 08/11/23 1519     Hemoglobin A1C 4.50 %     Narrative:      Hemoglobin A1C Ranges:    Increased Risk for Diabetes  5.7% to 6.4%  Diabetes                     >= 6.5%  Diabetic Goal                < 7.0%    Occult Blood X 1, Stool - Stool, Per Rectum [[id number]]  (Abnormal) Collected: 08/11/23 1350    Specimen: Stool from Per Rectum Updated: 08/11/23 1412     Fecal Occult Blood Positive    POC Glucose Once [[id number]]  (Normal) Collected: 08/11/23 0530    Specimen: Blood Updated: 08/11/23 0541     Glucose 101 mg/dL      Comment: : [id number] Vivek García ID: [id number]       High Sensitivity Troponin T 2Hr [[id number]]  (Abnormal) Collected: 08/11/23 0045    Specimen: Blood Updated: 08/11/23 0110     HS Troponin T 387 ng/L      Troponin T Delta 47 ng/L     Narrative:      High Sensitive Troponin T Reference Range:  <10.0 ng/L- Negative Female for AMI  <15.0 ng/L- Negative Male for AMI  >=10 - Abnormal Female indicating possible myocardial injury.  >=15 - Abnormal Male indicating possible myocardial injury.   Clinicians would have to utilize clinical acumen, EKG, Troponin, and serial  changes to determine if it is an Acute Myocardial Infarction or myocardial injury due to an underlying chronic condition.         [place] Draw [[id number]] Collected: 08/10/23 2250    Specimen: Blood from Arm, Right Updated: 08/11/23 0001    Narrative:      The following orders were created for panel order [place] Draw.  Procedure                               Abnormality         Status                     ---------                               -----------         ------                     Green Top (Gel)[[id number]]                                  Final result               Lavender Top[[id number]]                                     Final result               Red Top[[id number]]                                          Final result               Light Blue Top[[id number]]                                   Final result                 Please view results for these tests on the individual orders.    Lavender Top [[id number]] Collected: 08/10/23 2250    Specimen: Blood from Arm, Right Updated: 08/11/23 0001     Extra Tube hold for add-on     Comment: Auto resulted       Red Top [[id number]] Collected: 08/10/23 2250    Specimen: Blood from Arm, Right Updated: 08/11/23 0001     Extra Tube Hold for add-ons.     Comment: Auto resulted.       Light Blue Top [[id number]] Collected: 08/10/23 2250    Specimen: Blood from Arm, Right Updated: 08/11/23 0001     Extra Tube Hold for add-ons.     Comment: Auto resulted       Green Top (Gel) [[id number]] Collected: 08/10/23 2250    Specimen: Blood from Arm, Right Updated: 08/11/23 0001     Extra Tube Hold for add-ons.     Comment: Auto resulted.       Magnesium [[id number]]  (Normal) Collected: 08/10/23 2250    Specimen: Blood from Arm, Right Updated: 08/10/23 2347     Magnesium 1.9 mg/dL     Single High Sensitivity Troponin T [[id number]]  (Abnormal) Collected: 08/10/23 2250    Specimen: Blood from Arm, Right Updated: 08/10/23 2322     HS Troponin T 340 ng/L     Narrative:      High  Sensitive Troponin T Reference Range:  <10.0 ng/L- Negative Female for AMI  <15.0 ng/L- Negative Male for AMI  >=10 - Abnormal Female indicating possible myocardial injury.  >=15 - Abnormal Male indicating possible myocardial injury.   Clinicians would have to utilize clinical acumen, EKG, Troponin, and serial changes to determine if it is an Acute Myocardial Infarction or myocardial injury due to an underlying chronic condition.         Comprehensive Metabolic Panel [[id number]]  (Abnormal) Collected: 08/10/23 2250    Specimen: Blood from Arm, Right Updated: 08/10/23 2318     Glucose 111 mg/dL      BUN 16 mg/dL      Creatinine 0.60 mg/dL      Sodium 139 mmol/L      Potassium 3.0 mmol/L      Chloride 102 mmol/L      CO2 26.0 mmol/L      Calcium 8.4 mg/dL      Total Protein 6.2 g/dL      Albumin 3.5 g/dL      ALT (SGPT) 12 U/L      AST (SGOT) 37 U/L      Alkaline Phosphatase 118 U/L      Total Bilirubin 0.3 mg/dL      Globulin 2.7 gm/dL      A/G Ratio 1.3 g/dL      BUN/Creatinine Ratio 26.7     Anion Gap 11.0 mmol/L      eGFR 92.6 mL/min/1.73     Narrative:      GFR Normal >60  Chronic Kidney Disease <60  Kidney Failure <15    The GFR formula is only valid for adults with stable renal function between ages 18 and 70.    Protime-INR [[id number]]  (Abnormal) Collected: 08/10/23 2250    Specimen: Blood from Arm, Right Updated: 08/10/23 2312     Protime 15.7 Seconds      INR 1.24    aPTT [[id number]]  (Normal) Collected: 08/10/23 2250    Specimen: Blood from Arm, Right Updated: 08/10/23 2312     PTT 29.1 seconds     CBC & Differential [[id number]]  (Abnormal) Collected: 08/10/23 2250    Specimen: Blood from Arm, Right Updated: 08/10/23 2300    Narrative:      The following orders were created for panel order CBC & Differential.  Procedure                               Abnormality         Status                     ---------                               -----------         ------                     CBC Auto  Differential[[id number]]        Abnormal            Final result                 Please view results for these tests on the individual orders.    CBC Auto Differential [[id number]]  (Abnormal) Collected: 08/10/23 2250    Specimen: Blood from Arm, Right Updated: 08/10/23 2300     WBC 11.97 10*3/mm3      RBC 3.67 10*6/mm3      Hemoglobin 8.7 g/dL      Hematocrit 30.9 %      MCV 84.2 fL      MCH 23.7 pg      MCHC 28.2 g/dL      RDW 18.4 %      RDW-SD 56.4 fl      MPV 8.7 fL      Platelets 275 10*3/mm3      Neutrophil % 82.3 %      Lymphocyte % 7.9 %      Monocyte % 7.4 %      Eosinophil % 1.7 %      Basophil % 0.3 %      Immature Grans % 0.4 %      Neutrophils, Absolute 9.86 10*3/mm3      Lymphocytes, Absolute 0.94 10*3/mm3      Monocytes, Absolute 0.89 10*3/mm3      Eosinophils, Absolute 0.20 10*3/mm3      Basophils, Absolute 0.03 10*3/mm3      Immature Grans, Absolute 0.05 10*3/mm3      nRBC 0.0 /100 WBC     POC Glucose Once [[id number]]  (Abnormal) Collected: 08/10/23 2244    Specimen: Blood Updated: 08/10/23 2255     Glucose 169 mg/dL      Comment: : [id number] Antoni HopkinsMeter ID: [id number]               Imaging Results (Last 72 Hours)       Procedure Component Value Units Date/Time    XR Chest 1 View [[id number]] Collected: 08/12/23 1128     Updated: 08/12/23 1131    Narrative:      EXAM/TECHNIQUE: XR CHEST 1 VW-     INDICATION: aspiration; I63.9-Cerebral infarction, unspecified;  R13.12-Dysphagia, oropharyngeal phase     COMPARISON: 08/10/2023     FINDINGS:     The cardiac silhouette is normal in size. Stable position of LEFT chest  wall 2-lead cardiac pacing/ICD device. No pleural effusion,  pneumothorax, or focal consolidation. No acute osseous finding.       Impression:         No acute findings.  This report was finalized on 08/12/2023 11:28 by Dr. Collin Gordillo MD.    CT Head Without Contrast [[id number]] Collected: 08/12/23 0836     Updated: 08/12/23 0844    Narrative:      EXAM/TECHNIQUE: CT HEAD WO  CONTRAST-     INDICATION: Neuro decline; I63.9-Cerebral infarction, unspecified;  R13.12-Dysphagia, oropharyngeal phase     COMPARISON: Prior day head CT     DLP: 532 mGy cm. Automated exposure control was also utilized to  decrease patient radiation dose.     FINDINGS:     Continued progression of low-density change in the RIGHT MCA frontal  lobe distribution with gray-white differentiation loss, compatible with  continued evolution of acute infarct. Mild surrounding edema is present.  No significant midline shift. No acute intracranial hemorrhage. Lateral  ventricles are nondilated. Basilar cisterns are patent. No acute orbital  finding. Mastoid air cells are clear. Visualized paranasal sinuses are  clear. No acute osseous finding.       Impression:         Continued evolution of RIGHT MCA acute infarct with continued  progression of hypodense parenchymal change in the RIGHT anterior  frontal lobe and slight increase in surrounding edema. No midline shift.  No evidence of intracranial hemorrhage.  This report was finalized on 08/12/2023 08:41 by Dr. Collin Gordillo MD.    CT Head Without Contrast [[id number]] Collected: 08/12/23 0505     Updated: 08/12/23 0511    Narrative:      EXAM/TECHNIQUE: CT HEAD WO CONTRAST-     INDICATION: Stroke, follow up     COMPARISON: 08/10/2023     DLP: 683 mGy cm. Automated exposure control was also utilized to  decrease patient radiation dose.     FINDINGS:     New areas of hypoattenuation with gray-white differentiation loss in the  RIGHT anterior frontal lobe, correlating with areas of ischemia  identified on prior brain CT perfusion scan, compatible with acute  infarct. No left-sided gray-white differentiation loss.     No acute intracranial hemorrhage. No midline shift or significant mass  effect. Lateral ventricles are nondilated. Basilar cisterns are patent.  No acute orbital finding. Mastoid air cells and visualized portion of  the paranasal sinuses are clear. No acute osseous  finding.       Impression:         1.  Evolving acute RIGHT MCA infarct, correlating with area of concern  on prior CT brain perfusion.  2.  No acute intracranial hemorrhage.     These findings are in agreement with the critical and emergent findings  from the [facility] preliminary report.  This report was finalized on 08/12/2023 05:08 by Dr. Collin Gordillo MD.    CT Angiogram Neck [[id number]] Collected: 08/11/23 0600     Updated: 08/11/23 0717    Narrative:      EXAMINATION: CT ANGIOGRAM NECK-      8/10/2023 10:50 PM CDT     HISTORY: Neuro deficit, acute stroke suspected     In order to have a CT radiation dose as low as reasonably achievable  Automated Exposure Control was utilized for adjustment of the mA and/or  KV according to patient size.     DLP in mGycm= 315     The CT angiography of the neck is performed after intravenous contrast  enhancement.     The images are acquired in axial plane and subsequent 2-D reconstruction  in coronal and sagittal planes and 3-D maximum intensity projection  reconstruction.     There is no previous similar study for comparison. Correlation is made  with CT scan of the head obtained earlier today.     Atheromatous changes of the limited visualized thoracic aorta and aortic  arch is seen. No aneurysmal dilatation or stenosis.     There is normal origin course and caliber of the right brachiocephalic,  left common carotid and left subclavian arteries.     The right brachiocephalic artery divides into normal-appearing  subclavian and right common carotid artery.     Both common carotid arteries are normal in course and caliber in the  neck.     Small calcific plaques are seen in the distal right common carotid  artery. No stenosis.     Both common carotid artery divides into normal-appearing intrarenal  external carotid arteries. There is a small calcific plaque in the  proximal right internal carotid artery without stenosis.     Both internal carotid arteries moderately tortuous  and a medialized into  the posterior pharyngeal wall. There is subsequent direct and laterally  and up to the previous of the skull. Normal size internal carotid  arteries are seen at the skull base and in the carotid canals  bilaterally. The intracranial courses reviewed and discussed separately.     Relatively small right and a dominant left vertebral artery arises from  the subclavian arteries. There is subsequent normal course of both  vertebral arteries throughout the neck. A small right and a dominant  left vertebral artery enter the foramen magnum. The right vertebral  artery is significantly attenuated after the origin of the right PICA.  Subsequently both joint to make a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

## 2023-08-12 NOTE — CONSULTS
[facility]  Initial Inpatient Consult Note    Referring Provider: Hospitalist physician    Reason for Consultation: GI bleed    Subjective     History of present illness:    77 y.o. female history of MCA stroke that appears to be evolving she has chronic congestive heart failure hypertension and a defibrillator/cardioverter the patient had some bleeding since with streaks of blood on brown stool and is status post TNK therapy  The patient's mental status has worsened today she is somnolent and barely replies to pain full stimuli      Past Medical History:  Past Medical History:   Diagnosis Date    Broken femur     Chronic combined systolic (congestive) and diastolic (congestive) heart failure     Fracture of hip     left, due to lymphoma, treated non-operatively    Hypertension     Lymphoma     Presence of automatic cardioverter/defibrillator (AICD)      Past Surgical History:  Past Surgical History:   Procedure Laterality Date    CARDIAC ELECTROPHYSIOLOGY PROCEDURE N/A 3/2/2022    Procedure: ICD DC new;  Surgeon: Austin Gordillo MD;  Location:  [facility] CATH INVASIVE LOCATION;  Service: Cardiology;  Laterality: N/A;    CHOLECYSTECTOMY      HERNIA REPAIR      HIP TROCHANTERIC NAILING WITH INTRAMEDULLARY HIP SCREW Left 2/2/2017    Procedure: HIP TROCANTERIC NAILING LONG WITH INTRAMEDULLARY HIP SCREW WITH CAST APPLICATION TO LEFT HAND;  Surgeon: Beni Culp MD;  Location:  [facility] OR;  Service:       Social History:   Social History     Tobacco Use    Smoking status: Former    Smokeless tobacco: Never    Tobacco comments:     quit 55 years ago   Substance Use Topics    Alcohol use: No      Family History:  Family History   Problem Relation Age of Onset    Heart failure Mother     Liver disease Father     Heart disease Sister        Home Meds:  Medications Prior to Admission   Medication Sig Dispense Refill Last Dose    acetaminophen (TYLENOL) 650 MG 8 hr tablet Take 1 tablet by  mouth Every 8 (Eight) Hours As Needed for Mild Pain.   Past Month    busPIRone (BUSPAR) 5 MG tablet Take 1 tablet by mouth 3 (Three) Times a Day As Needed (anxiety). 90 tablet 0 Past Week    cholecalciferol (VITAMIN D3) 25 MCG (1000 UT) tablet Take 1 tablet by mouth Daily. 30 tablet 2 8/10/2023    furosemide (LASIX) 20 MG tablet Take 1 tablet by mouth Daily As Needed (for wt gain of 2 lbs from the day before). 15 tablet 3 Past Month    metoprolol succinate XL (TOPROL-XL) 50 MG 24 hr tablet Take 1 tablet by mouth Daily.   8/10/2023    oxyCODONE (ROXICODONE) 15 MG immediate release tablet Take 1 tablet by mouth Every 6 (Six) Hours.   8/10/2023    oxyCODONE ER (oxyCONTIN) 15 MG tablet extended-release 12 hour Take 1 tablet by mouth Every 12 (Twelve) Hours.   8/10/2023    sacubitril-valsartan (ENTRESTO) 24-26 MG tablet Take 1 tablet by mouth 2 (Two) Times a Day. 60 tablet 11 8/10/2023    spironolactone (ALDACTONE) 25 MG tablet Take 1 tablet by mouth Daily. 90 tablet 3 8/10/2023    empagliflozin (Jardiance) 10 MG tablet tablet Take 1 tablet by mouth Daily. 30 tablet 11 Unknown    naloxone (NARCAN) 4 MG/0.1ML nasal spray 1 spray into the nostril(s) as directed by provider As Needed (opioid overdose).   Unknown     Current Meds:   atorvastatin, 40 mg, Oral, Nightly  Chlorhexidine Gluconate Cloth, 1 application , Topical, Once  [START ON 8/13/2023] Chlorhexidine Gluconate Cloth, 1 application , Topical, Q24H  mupirocin, 1 application , Each Nare, BID  pantoprazole, 40 mg, Intravenous, Q12H  piperacillin-tazobactam, 3.375 g, Intravenous, Once  piperacillin-tazobactam, 3.375 g, Intravenous, Q8H      Allergies:  Allergies   Allergen Reactions    Phenergan [Promethazine Hcl]     Promethazine Unknown - High Severity     Review of Systems  Pertinent items are noted in HPI, all other systems reviewed and negative         Vital Signs  Temp:  [98.9 øF (37.2 øC)-99 øF (37.2 øC)] 98.9 øF (37.2 øC)  Heart Rate:  [] 103  Resp:   [22-26] 26  BP: ()/(49-89) 123/63  Physical Exam:  General Appearance:    somnolent in no acute distress   Head:    Normocephalic,   Eyes:          conjunctivae and sclerae normal, no   icterus   Throat:   no thrush, oral mucosa moist   Neck:   Supple, no adenopathy   Lungs:     Clear to auscultation bilaterally    Heart:  Distant heart sounds   Chest Wall:    No abnormalities observed   Abdomen:     Soft, nondistended, nontender; normal bowel sounds   Extremities:   no edema, no redness   Skin:   No bruising or rash       Results Review:  []  Laboratory   [x]  Radiology  []  Pathology      I reviewed the patient's new clinical results.    Results from last 7 days   Lab Units 08/12/23  0932 08/12/23  0530 08/11/23  1812 08/10/23  2250 08/10/23  1229   WBC 10*3/mm3 21.59*  --   --  11.97* 13.82*   HEMOGLOBIN g/dL 8.0* 8.1* 8.2* 8.7* 9.2*   HEMATOCRIT % 27.0* 28.7* 29.3* 30.9* 31.8*   PLATELETS 10*3/mm3 286  --   --  275 321     Results from last 7 days   Lab Units 08/12/23  0932 08/10/23  2250 08/10/23  1229   SODIUM mmol/L 139 139 136   POTASSIUM mmol/L 3.6 3.0* 3.2*   CHLORIDE mmol/L 104 102 98   CO2 mmol/L 18.0* 26.0 27.0   BUN mg/dL 15 16 17   CREATININE mg/dL 0.45* 0.60 0.62   CALCIUM mg/dL 8.3* 8.4* 9.1   BILIRUBIN mg/dL 0.4 0.3 0.3   ALK PHOS U/L 130* 118* 130*   ALT (SGPT) U/L 20 12 11   AST (SGOT) U/L 116* 37* 28   GLUCOSE mg/dL 146* 111* 137*     Results from last 7 days   Lab Units 08/10/23  2250   INR  1.24*     No results found for: LIPASE    Radiology:  CT Head Without Contrast   Final Result       Continued evolution of RIGHT MCA acute infarct with continued   progression of hypodense parenchymal change in the RIGHT anterior   frontal lobe and slight increase in surrounding edema. No midline shift.   No evidence of intracranial hemorrhage.   This report was finalized on 08/12/2023 08:41 by Dr. Collin Gordillo MD.      CT Head Without Contrast   Final Result       1.  Evolving acute RIGHT MCA  infarct, correlating with area of concern   on prior CT brain perfusion.   2.  No acute intracranial hemorrhage.       These findings are in agreement with the critical and emergent findings   from the [facility] preliminary report.   This report was finalized on 08/12/2023 05:08 by Dr. Collin Gordillo MD.      XR Chest 1 View   Final Result   1. No active cardiopulmonary disease.   This report was finalized on 08/11/2023 06:17 by Dr. Talita Matt MD.      CT CEREBRAL PERFUSION WITH & WITHOUT CONTRAST   Final Result   Impression:   1. Abnormal perfusion exam with acute ischemia as described above.       These findings are in agreement with the critical and emergent findings   from the [facility] preliminary report.   This report was finalized on 08/11/2023 06:55 by  Freddie Ko DO.      CT Angiogram Head w AI Analysis of LVO   Final Result       Right M2 occlusion.       These findings do not agree with the [facility] report and have been   appropriately communicated.       These findings were communicated with Dr. Borges on 8/11/2023 at 6:49 AM.   This report was finalized on 08/11/2023 06:50 by  Freddie Ko DO.      CT Angiogram Neck   Final Result   1. No hemodynamically significant carotid or vertebral artery stenosis   as detailed above. The CT angiography of the head is reported   separately.   2. NASCET criteria was utilized for estimation of carotid arterial   stenosis.       The above study was initially reviewed and reported by [facility]. I do not   find any discrepancies.                               This report was finalized on 08/11/2023 07:14 by Dr. Talita Matt MD.      CT Head Without Contrast Stroke Protocol   Final Result   1. No acute intracranial abnormality.       The above study was initially reviewed and reported by [facility]. I do not   find any discrepancies.                                       This report was finalized on 08/11/2023 05:29 by Dr. Talita Matt MD.      MRI Brain Without  Contrast    (Results Pending)   XR Chest 1 View    (Results Pending)        Assessment & Plan     Active Hospital Problems    Diagnosis     **Stroke     PAF (paroxysmal atrial fibrillation)     Severe malnutrition     AICD (automatic cardioverter/defibrillator) present     Cardiomyopathy     Primary hypertension     Chronic pain disorder     Chronic pain due to neoplasm         Plan:  Regarding bleeding treat as needed  PPI as prophylaxis  Transfuse if needed  No other treatment plan from gastroenterology at this time      I discussed the patients findings and my recommendations with nursing staff.    Electronically signed by Dada Greene MD, 08/12/23, 10:59 AM CDT.

## 2023-08-12 NOTE — PROGRESS NOTES
Neurology Progress Note      Patient:Mitzi Villafuerte   MRN: [id number]  : 1945    Reason for Consult: L sided weakness  Length of Stay:  1   Subjective:   Patient remains in the ICU. Acute mental status changed noted this morning by RN. Patient is less responsive, difficult to arouse, only responding verbally to pain, not following commands. She also had an episode of emesis and is now tachycardic, tachypneic and her Hb is trending down as she continues to have bloody bowel movements. Repeat CTH w/ increased edema, but no midline shift or hemorrhagic transformation.    Current Medications:  Current Facility-Administered Medications   Medication Dose Route Frequency Provider Last Rate Last Admin    acetaminophen (TYLENOL) tablet 650 mg  650 mg Oral Q6H PRN Babar Villafuerte MD   650 mg at 23 1342    atorvastatin (LIPITOR) tablet 40 mg  40 mg Oral Nightly Ofelia Benjamin MD   40 mg at 23    LORazepam (ATIVAN) injection 1 mg  1 mg Intravenous Once PRN Babar Villafuerte MD        niCARdipine (CARDENE) 25 mg in 250 mL NS infusion  5-15 mg/hr Intravenous Titrated Tripp Kelley MD   Held at 23 0700    ondansetron (ZOFRAN) injection 4 mg  4 mg Intravenous Q6H PRN Babar Villafuerte MD   4 mg at 23 0716    pantoprazole (PROTONIX) injection 40 mg  40 mg Intravenous Q12H Babar Villafuerte MD   40 mg at 23 0854     Physical Examination:   Vital Signs:  Vitals:    23 0600 23 0615 23 0630 23 0645   BP: 109/61 111/62 120/64 123/63   Pulse: 81 82 99 103   Resp:       Temp:       TempSrc:       SpO2: 99% 100% 100% 99%   Weight:       Height:           General Examination:  Constitutional:  Well-developed/Well norished.  Eyes:   No scleral icterus.  HENT:   Normocephalic/atraumatic.   Neck/Back:  Supple w/ full range of motion.  Cardiovascular:  Tachycardic, regular rhythm.   Respiratory:   No stridor, no respiratory  distress.  Gastrointestinal:  Soft. No distension or tenderness.  Musculoskeletal:  No edema or tenderness.    Skin:    Warm and dry. No rash noted.   Psychiatric:   Insight inadequate, mood calm.     Neurological Examination:  Mental status:   Stuporous. Does not follow commands.  Language/speech: Mildly dysarthric. Comprehension intact.   Cranial nerves:  Will not open eyes .      Grimaces to pain.     (+) Gag/Cough.  Motor system:  Muscle tone and appearance are normal. No contractures.     RUE: No effort against gravity.     LUE: No effort against gravity.     RLE: Some movement against gravity.     LLE: Some movement against gravity.  Sensory:   Withdraws to pain in R LE only.  Coordination:   No tremor or abnormal movements.  Gait:    Unsafe to ambulate  Recent Diagnostics:   Laboratory Results:   - Reviewed in EMR    Imaging Results:  Imaging reviewed personally in EMR/RAPID AI.    CT BRAIN - no acute intracranial abnormalities.  CTA HEAD/NECK- possible distal RM2 LVO  Repeat CTH - increased edema, but no midline shift or hemorrhagic transformation.  MRI BRAIN - Pending    Results for orders placed during the hospital encounter of 08/10/23    Adult Transthoracic Echo Complete W/ Cont if Necessary Per Protocol (With Agitated Saline)    Interpretation Summary    Left ventricular systolic function is moderately decreased. Estimated left ventricular EF = 40%    The following left ventricular wall segments are hypokinetic: basal anterolateral, mid anterolateral, apical lateral, basal inferolateral, mid inferolateral, apical septal, basal inferoseptal, mid inferoseptal, apex hypokinetic, mid anteroseptal, basal inferior and basal inferoseptal. The following left ventricular wall segments are akinetic: apical inferior and mid inferior.    Left ventricular diastolic function is consistent with (grade Ia w/high LAP) impaired relaxation.    Estimated right ventricular systolic pressure from tricuspid regurgitation is  normal (<35 mmHg).    Normal size and function right ventricle.    No significant (greater than mild) valvular pathology.    Saline test results are negative.    Compared to prior exam from 5/4/2023, LV systolic function has improved slightly.    Last nurse assessment:  Interval:  (shift change)  1a. Level of Consciousness: 1-->Not alert, but arousable by minor stimulation to obey, answer, or respond  1b. LOC Questions: 0-->Answers both questions correctly  1c. LOC Commands: 0-->Performs both tasks correctly  2. Best Gaze: 2-->Forced deviation, or total gaze paresis not overcome by the oculocephalic maneuver  3. Visual: 1-->Partial hemianopia  4. Facial Palsy: 2-->Partial paralysis (total or near-total paralysis of lower face)  5a. Motor Arm, Left: 3-->No effort against gravity, limb falls  5b. Motor Arm, Right: 2-->Some effort against gravity, limb cannot get to or maintain (if cued) 90 (or 45) degrees, drifts down to bed, but has some effort against gravity  6a. Motor Leg, Left: 2-->Some effort against gravity, leg falls to bed by 5 secs, but has some effort against gravity  6b. Motor Leg, Right: 0-->No drift, leg holds 30 degree position for full 5 secs  7. Limb Ataxia: 1-->Present in one limb  8. Sensory: 1-->Mild-to-moderate sensory loss, patient feels pinprick is less sharp or is dull on the affected side, or there is a loss of superficial pain with pinprick, but patient is aware of being touched  9. Best Language: 0-->No aphasia, normal  10. Dysarthria: 0-->Normal  11. Extinction and Inattention (formerly Neglect): 1-->Visual, tactile, auditory, spatial, or personal inattention or extinction to bilateral simultaneous stimulation in one of the sensory modalities  Total (NIH Stroke Scale): 16  Assessment & Plan:   Patient is a 77 y.o. woman with multiple chronic medical problems including HTN, HLD, Afib not on AC, cardiomyopathy, CHFrEF s/p AICD, chronic back pain and malignant lymphoma who presented w/ acute  onset L sided weakness and was treated with TNK. Stroke etiology likely cardioembolic given patient is not anticoagulated and has Afib. LDL 64, HbA1c 4.5. Hb is trending down. Repeat CTH w/ increased edema, but no midline shift or hemorrhagic transformation.    Impression:  1) Acute ischemic stroke s/p TNK  2) HTN  3) HLD  4) Afib, not on AC  5) Blood per rectum  6) Chronic pain    PLAN:   - Labs to include: CBC, CMP, ABG, Blood cultures, troponin  - CXR to assess for aspiration pneumonia  - Recommend GI consult for new blood per rectum  - MRI brain, to assess for degree of ischemia, pending  - Continue taking atorvastatin 20mg for cholesterol reduction with a LDL goal of < 70.  - Hold antiplatelet and AC in setting of possible GI bleed    Ofelia Benjamin MD  08/12/23  09:05 CDT

## 2023-08-12 NOTE — PLAN OF CARE
Goal Outcome Evaluation:   Pt lethargic and disoriented to time. Pt follows commands when repeatedly asked. Pt showed agitation because of repeated neuro questions. CT of the head was done around 2330. Pt had 3 bloody BM. Adequate UOP. Moves RUE and RLE. Pt was able to wiggle toes on left foot, but was not able to move the whole leg. Pt was not able to move left arm. At beginning of shift, pt moved fingers slightly, but did not the rest of night. Pt took pills whole with applesauce. Turned q2h. Safety maintained.
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
## 2023-08-12 NOTE — CASE MANAGEMENT/SOCIAL WORK
Continued Stay Note   Min     Patient Name: Mitzi Villafuerte  MRN: [id number]  Today's Date: 8/12/2023    Admit Date: 8/10/2023    Plan: Unclear   Discharge Plan       Row Name 08/12/23 1248       Plan    Plan Unclear    Plan Comments Called and left a message for patient's son to discuss discharge planning. Patient still critical in ICU so not able to work with therapy yet. SS will follow for progress / plan of care and will speak to patient's family re dc needs when appropriate.             Brooke Molina MSW

## 2023-08-13 NOTE — NURSING NOTE
New ST elevation noted on telemetry monitor. Received an order for an EKG. At 2355, Reported EKG reading to Dr. Dawkins. After discussion about patient's status, no orders received.

## 2023-08-13 NOTE — PROCEDURES
"Insert Central Line At Bedside    Date/Time: 8/13/2023 1:52 PM  Performed by: Urban France DO  Authorized by: Urban France DO   Consent: Verbal consent obtained.  Risks and benefits discussed: Nursing and Dr. Villafuerte spoke to family.   Required items: required blood products, implants, devices, and special equipment available  Patient identity confirmed: arm band  Time out: Immediately prior to procedure a \"time out\" was called to verify the correct patient, procedure, equipment, support staff and site/side marked as required.  Indications: vascular access  Preparation: skin prepped with ChloraPrep  Location details: right femoral  Site selection rationale: Dr. Lal had tried her IJ and Dr. Ortiz tried her left femoral.   Patient position: flat  Catheter type: triple lumen  Catheter size: 7 Fr  Pre-procedure: landmarks identified  Ultrasound guidance: yes (But not helpful at all so I used landmarks. )  Sterile ultrasound techniques: sterile gel and sterile probe covers were used  Number of attempts: 2  Successful placement: yes  Post-procedure: line sutured and dressing applied  Assessment: blood return through all ports and free fluid flow  Patient tolerance: patient tolerated the procedure well with no immediate complications      Dr. Villafuerte had requested a central line be placed after the patient required intubation.  She is in shock and basically had no good, reliable peripheral access.  Dr. Lal was unable to place an internal jugular line.  He met resistance with the guidewire probably due to the fact that she has an AICD in place.  Dr. Ortiz came up from the ER and tried to place a left femoral CVC.  He met the same problem due to calcifications.  Thankfully, I was able to place a right femoral CVC.    She is requiring pressors.  I did try briefly to place a right femoral arterial line.  I was only able to get a flash for a moment, but nothing reliable that would allow for cannulation with a " sav.      Electronically signed by Urban France DO, 08/13/23, 1:56 PM CDT.

## 2023-08-13 NOTE — PROGRESS NOTES
[facility] -  Progress Note     LOS: 2 days   Patient Care Team:  Urban Main MD as PCP - General (Internal Medicine)  Romeo Shaffer APRN as Nurse Practitioner (Cardiology)  Austin Gordillo MD as Cardiologist (Cardiology)    Chief Complaint: Follow-up stroke, tachycardia, elevated troponin    Subjective     Interval History: Patient has remained unresponsive overnight.  I was called around midnight with a rhythm change when she became more tachycardic, with twelve-lead ECGs and demonstrating diffuse ST elevation in the inferior and precordial leads.  As stated yesterday, she is not a candidate for invasive therapy.  She has been persistently tachycardic ever since, though with no ventricular arrhythmias.  Her respiratory rate has steadily climbed though her oxygenation has not changed.    Review of Systems:  Review of Systems   Unable to perform ROS: Patient unresponsive     Vital Sign Min/Max for last 24 hours  Temp  Min: 97.6 øF (36.4 øC)  Max: 98.6 øF (37 øC)   BP  Min: 82/44  Max: 128/68   Pulse  Min: 103  Max: 148   No data recorded   SpO2  Min: 95 %  Max: 100 %   No data recorded   Weight  Min: 42.9 kg (94 lb 9.6 oz)  Max: 42.9 kg (94 lb 9.6 oz)         08/13/23  0500   Weight: 42.9 kg (94 lb 9.6 oz)       Physical Exam:   Vitals and nursing note reviewed.   Constitutional:       General: Not in acute distress.     Appearance: Acutely ill-appearing.   Neck:      Vascular: No JVD or JVR. JVD normal.   Pulmonary:      Effort: Increased respiratory effort.      Breath sounds: Normal breath sounds.   Cardiovascular:      Tachycardia present. Regular rhythm.      Murmurs: There is no murmur.      No gallop.  No rub.   Pulses:     Intact distal pulses.   Edema:     Peripheral edema absent.   Skin:     General: Skin is warm and dry.   Neurological:      Comments: +unresponsive       Medication Review: yes  Current Facility-Administered Medications   Medication Dose Route  Frequency Provider Last Rate Last Admin    acetaminophen (TYLENOL) tablet 650 mg  650 mg Oral Q6H PRN Babar Villafuerte MD   650 mg at 08/11/23 1342    atorvastatin (LIPITOR) tablet 40 mg  40 mg Oral Nightly Ofelia Benjamin MD   40 mg at 08/11/23 2007    Chlorhexidine Gluconate Cloth 2 % pads 1 application   1 application  Topical Q24H Babar Villafuerte MD   1 application  at 08/13/23 0322    LORazepam (ATIVAN) injection 1 mg  1 mg Intravenous Once PRN Babar Villafuerte MD        mupirocin (BACTROBAN) 2 % nasal ointment 1 application   1 application  Each Nare BID Babar Villafuerte MD   1 application  at 08/12/23 2202    niCARdipine (CARDENE) 25 mg in 250 mL NS infusion  5-15 mg/hr Intravenous Titrated Tripp Kelley MD   Held at 08/11/23 0700    ondansetron (ZOFRAN) injection 4 mg  4 mg Intravenous Q6H PRN Babar Villafuerte MD   4 mg at 08/12/23 0716    pantoprazole (PROTONIX) injection 40 mg  40 mg Intravenous Q12H Babar Villafuerte MD   40 mg at 08/12/23 2202    piperacillin-tazobactam (ZOSYN) 3.375 g in iso-osmotic dextrose 50 ml (premix)  3.375 g Intravenous Q8H Ofelia Benjamin MD   3.375 g at 08/13/23 0618    sodium chloride 0.9 % infusion  75 mL/hr Intravenous Continuous Babar Villafuerte MD 75 mL/hr at 08/13/23 0617 75 mL/hr at 08/13/23 0617         Results Review:   I have reviewed all laboratory data, with pertinent findings: k 3.3, co2 17, cr 1.11 (0.45 yesterday).  Hgb 8.8, wbc up to 27.7      I have reviewed telemetry, which shows persistent tachycardia with a steady rate of 140-150 bpm.  Careful exam of twelve-lead ECGs (as described below), suggest this is most likely an atypical atrial flutter.    I have visualized all the electrocardiograms performed, with my interpretations to follow:     -8/10/2023 2325: Sinus rhythm, rate of 76 bpm (with upright, broad P waves in the inferior leads and inverted P wave in V1, which is consistent with sinus  "origin    -8/12/2023 at 2338: Sinus P waves (upright) in the inferior leads are not present, but there are very small upright \"P waves\" in V1, suggesting an atypical atrial flutter with 2:1 AV conduction.  The ST segment/baseline in the inferior leads is undulating throughout the tracing, giving the appearance of ST elevation in II, III, and aVF, as well as V4 through V6.  This does not have the same morphologic consistency as seen with the acute current of injury, nor is there any reciprocal depression.      -Repeat ECG performed today at 903: unchanged from previous -- shows atypical atrial flutter with 2:1 AV conduction, rate 142 bpm, undulating baseline giving appearance of ST elevation (with upsloping ST segment) in the inferior leads and V4 through V6    Chest x-ray from this morning reviewed, my impression: Hyperexpanded lung fields, normal-sized heart, dual-chamber ICD in appropriate position, no evidence of pulmonary edema    Defibrillator interrogations reviewed:        Assessment & Plan       Stroke    Cardiomyopathy    Chronic pain disorder    Chronic pain due to neoplasm    Primary hypertension    AICD (automatic cardioverter/defibrillator) present    PAF (paroxysmal atrial fibrillation)    Severe malnutrition  Recommendations and plans:  Patient remains unresponsive, and has become progressively tachycardic and tachypneic.  However, neither of these appears to be a result of heart failure as she is maintaining normal oxygen saturation without supplemental oxygen, chest x-ray is clear this morning (which matches her exam, with clear breath sounds).  I suspect the abnormalities are neurogenic, whether all attributed to the stroke and/or secondary process such as metabolic.    Her elevated troponin at this point would be classified as acute myocardial injury, not reflective of acute coronary syndrome.      She has longstanding known nonischemic cardiomyopathy, and questionable history of SVT.  The only " previously documented atrial fibrillation was in turn felt like, by her primary cardiologist, to represent atrial tachycardia/SVT.  I reviewed defibrillator interrogations, as noted above.  Since February of this year (5+ month span, she spent a total of 29 minutes in any form of atrial tachycardia, with 19 of these episodes lasting less than 1 minute and the other 9 lasting just over 1 minute.  These short-lived episodes would be much more consistent with atrial tachycardia/SVT than atrial fibrillation).    Currently, she appears to be in atypical atrial flutter, so I have ordered IV amiodarone infusion (bolus plus infusion).    Given ECG changes overnight - which did not appear to have a current of injury or reciprocal ST depression typically noted with an acute occlusion of a coronary artery producing ST elevation - I repeated an echocardiogram today.  When compared to imaging from 8/11/2023 (2 days ago), there are subtle wall motion abnormalities (including increased contractility of the base, and worsened contractility to the point of akinesis at the apex), that are consistent with a stress-induced (Takotsubo) cardiomyopathy on top of the patient with a pre-existing nonischemic cardiomyopathy.  This certainly would fit the picture of a patient with a known nonischemic cardiomyopathy suffering from a massive stroke.    The decline in her ejection fraction as a result is likely contributing to some hypoperfusion of other organs including kidneys (which are showing acute kidney injury today) and perhaps also lactic acidosis (though with evidence of multiple embolic events to the brain, she also could have embolized to the intestines)    Cardiac catheterization would be of no benefit, and certainly could cause harm with need for anticoagulation and antiplatelet therapy since she still has active ongoing GI bleeding    Anion gap metabolic acidosis: Repeat ABG and lactate  -After doing so, it appears the patient has  a primary respiratory alkalosis that is acute, with secondary (AG+ [lactate]) metabolic acidosis and additional metabolic alkalosis.    Hypokalemia: Electrolyte replacement protocol ordered    Discussed with Jeyson Benjamin and Christo.  Patient's prognosis appears grim.  I certainly would agree with transitioning to comfort measures should the patient's family choose to do so.  Her current cardiac issues all seem secondary to stress-induced reactions to massive stroke.    Critical care time, 55 minutes    Dajuan Dawkins MD  08/13/23  09:03 CDT

## 2023-08-13 NOTE — PROCEDURES
Intubation    Date/Time: 8/13/2023 6:13 PM  Performed by: Babar Villafuerte MD  Authorized by: Babar Villafuerte MD   Consent: Verbal consent obtained.  Risks and benefits: risks, benefits and alternatives were discussed  Consent given by: power of   Patient identity confirmed: arm band  Indications: respiratory distress, respiratory failure and airway protection  Patient status: unconscious  Preoxygenation: none  Laryngoscope size: Mac 3  Tube size: 7.0 mm  Tube type: cuffed  Number of attempts: 1  Cricoid pressure: no  Cords visualized: yes  Post-procedure assessment: chest rise and CO2 detector  Breath sounds: equal  Cuff inflated: yes  ETT to lip: 20 cm  Tube secured with: ETT keyes  Chest x-ray interpreted by me and radiologist.  Chest x-ray findings: endotracheal tube in appropriate position  Patient tolerance: patient tolerated the procedure well with no immediate complications

## 2023-08-13 NOTE — CONSULTS
[facility] PULMONARY AND CRITICAL CARE CONSULT - [facility]    Mitzi Villafuerte   MR# [id number]  Acct# [id number]  8/13/2023   12:56 CDT    Referring Provider: Babar Villafuerte MD    Chief Complaint: Mechanically ventilated    HPI: We are consulted by Babar Villafuerte MD to see this 77 y.o. female currently receiving mechanical ventilation for acute respiratory failure and unresponsiveness following ischemic right MCA stroke treated with thrombolytics complicated by gi bleeding.  She was intubated today and remains hypotensive, undergoing central line placement.  She is known to have cardiomyopathy with variable lvef, hx AICD placement.  Pt is tachypneic, unable to provide history.     Past Medical History   has a past medical history of Broken femur, Chronic combined systolic (congestive) and diastolic (congestive) heart failure, Fracture of hip, Hypertension, Lymphoma, and Presence of automatic cardioverter/defibrillator (AICD). Follicular lymphoma tx [id number]   has a past surgical history that includes Cholecystectomy; Hernia repair; Hip Trochanteric Nailing (Left, 2/2/2017); and Cardiac electrophysiology procedure (N/A, 3/2/2022).  Allergies   Allergen Reactions    Phenergan [Promethazine Hcl]     Promethazine Unknown - High Severity     Medications  atorvastatin, 40 mg, Oral, Nightly  chlorhexidine, 15 mL, Mouth/Throat, Q12H  Chlorhexidine Gluconate Cloth, 1 application , Topical, Q24H  midazolam, , ,   midazolam, 1 mg, Intravenous, Once  mupirocin, 1 application , Each Nare, BID  pantoprazole, 40 mg, Intravenous, Q12H  piperacillin-tazobactam, 3.375 g, Intravenous, Q8H  potassium chloride, 10 mEq, Intravenous, Q1H      amiodarone, 1 mg/min, Last Rate: 1 mg/min (08/13/23 1129)   Followed by  amiodarone, 0.5 mg/min  niCARdipine, 5-15 mg/hr, Last Rate: Stopped (08/11/23 0700)  sodium chloride, 125 mL/hr, Last Rate: 125 mL/hr (08/13/23 1041)      Social History   reports that she has quit  smoking. She has never used smokeless tobacco. She reports that she does not drink alcohol and does not use drugs.  Family History  family history includes Heart disease in her sister; Heart failure in her mother; Liver disease in her father.  Physical Exam:  Temp:  [97.6 øF (36.4 øC)-98.6 øF (37 øC)] 98.6 øF (37 øC)  Heart Rate:  [104-148] 129  BP: ()/(53-74) 93/59  FiO2 (%):  [100 %] 100 %  Intake/Output Summary (Last 24 hours) at 8/13/2023 1256  Last data filed at 8/13/2023 0618  Gross per 24 hour   Intake 961.56 ml   Output --   Net 961.56 ml         08/12/23  0200 08/13/23  0500   Weight: 41.4 kg (91 lb 4.8 oz) 42.9 kg (94 lb 9.6 oz)     SpO2 Percentage    08/13/23 0400 08/13/23 0500 08/13/23 0600   SpO2: 95% 95% 96%     Body mass index is 17.3 kg/mý.  Vent Settings        Resp Rate (Set): 18     FiO2 (%): 100 %  PEEP/CPAP (cm H2O): 5 cm H20  Minute Ventilation (L/min) (Obs): 17.3 L/min  Resp Rate (Observed) Vent: 42     I:E Ratio (Obs): 1.2:1  PIP Observed (cm H2O): 24 cm H2O         Physical Exam  Constitutional:       General: She is sleeping. She is not in acute distress.     Appearance: She is well-developed. She is ill-appearing. She is not toxic-appearing.      Interventions: She is intubated.   HENT:      Head: Atraumatic.   Eyes:      General: No scleral icterus.  Cardiovascular:      Rate and Rhythm: Normal rate and regular rhythm.      Heart sounds: S1 normal and S2 normal.   Pulmonary:      Effort: Pulmonary effort is normal. She is intubated.      Breath sounds: Normal breath sounds. No wheezing.   Abdominal:      General: There is no distension.   Musculoskeletal:         General: No deformity.      Cervical back: Neck supple.      Right lower leg: No edema.      Left lower leg: No edema.   Skin:     Coloration: Skin is not pale.      Findings: No rash.     Result Review  Results from last 7 days   Lab Units 08/13/23  0747 08/12/23  1836 08/12/23  0932 08/11/23  1812 08/10/23  2250   WBC  10*3/mm3 27.74*  --  21.59*  --  11.97*   HEMOGLOBIN g/dL 8.8* 8.1* 8.0*   < > 8.7*   PLATELETS 10*3/mm3 158  --  286  --  275    < > = values in this interval not displayed.     Results from last 7 days   Lab Units 08/13/23  0747 08/12/23  0932 08/10/23  2250   SODIUM mmol/L 142 139 139   POTASSIUM mmol/L 3.3* 3.6 3.0*   CO2 mmol/L 17.0* 18.0* 26.0   BUN mg/dL 25* 15 16   CREATININE mg/dL 1.11* 0.45* 0.60   MAGNESIUM mg/dL  --   --  1.9   GLUCOSE mg/dL 190* 146* 111*     Results from last 7 days   Lab Units 08/13/23  1023 08/12/23  1859 08/12/23  0921   PH, ARTERIAL pH units 7.537* 7.484* 7.476*   PCO2, ARTERIAL mm Hg 16.7* 27.6* 29.9*   PO2 ART mm Hg 92.8 98.2 98.5   FIO2 % 21  --   --      Microbiology Results (last 10 days)       Procedure Component Value - Date/Time    Blood Culture - Blood, Arm, Right [[id number]]  (Normal) Collected: 08/12/23 0932    Lab Status: Preliminary result Specimen: Blood from Arm, Right Updated: 08/13/23 1002     Blood Culture No growth at 24 hours          Lab Results   Component Value Date    PROBNP 425.9 01/10/2023     Recent radiology:   Imaging Results (Last 72 Hours)       Procedure Component Value Units Date/Time    XR Chest 1 View [[id number]] Collected: 08/13/23 0750     Updated: 08/13/23 0755    Narrative:      EXAM/TECHNIQUE: XR CHEST 1 VW-     INDICATION: increased respiratory effort; I63.9-Cerebral infarction,  unspecified; R13.12-Dysphagia, oropharyngeal phase     COMPARISON: 08/12/2023     FINDINGS:     Cardiac silhouette is within normal limits and stable. LEFT chest wall  cardiac pacing/ICD device is stable in position. No pleural effusion or  pneumothorax. No focal area of consolidation.       Impression:         No acute finding or significant change.  This report was finalized on 08/13/2023 07:52 by Dr. Collin Gordillo MD.    MRI Brain Without Contrast [[id number]] Collected: 08/12/23 1543     Updated: 08/12/23 1601    Narrative:      EXAMINATION:  MRI BRAIN WO  CONTRAST-  8/12/2023 2:39 PM CDT     HISTORY: Neuro deficit, acute, stroke suspected; I63.9-Cerebral  infarction, unspecified; R13.12-Dysphagia, oropharyngeal phase. Acute  right MCA infarct.     TECHNIQUE: Multiplanar imaging was performed in a high field magnet.     COMPARISON: CT 08/12/2023 at 8:24 AM.     FINDINGS: On the diffusion sequence, there are acute infarcts in the  cerebellar hemispheres bilaterally. This is much greater on the right  side. On the right side, it is predominantly in the PICA distribution.  There are bilateral occipital pole acute infarcts. There is a fairly  large right MCA distribution infarct in the right hemisphere involving  the insular cortex and right frontal lobe predominantly. There is acute  infarct involving the right basal ganglia anteriorly and the right  caudate nucleus. There are patchy peripheral acute infarcts in the  bilateral frontal-parietal convexities all of these areas of infarct  demonstrate diffusion restriction on the ADC map images as well as areas  of T1 and T2 signal abnormality. There is mild sulcal effacement in the  right frontal lobe. There is no significant shifting of midline  structures.          Impression:      Bilateral hemispheric and bilateral cerebellar acute  infarcts involving the carotid and vertebrobasilar circulations. The  largest infarct is in the right frontal lobe where there is sulcal  effacement. There is no significant shifting of midline structures.        I called the patient's nurse in the intensive care unit at 3:58 PM on  08/12/2023. I asked that she inform the neurologist on-call about the  MRI findings.                       This report was finalized on 08/12/2023 15:58 by Dr. Artie Almonte MD.    XR Chest 1 View [[id number]] Collected: 08/12/23 1128     Updated: 08/12/23 1131    Narrative:      EXAM/TECHNIQUE: XR CHEST 1 VW-     INDICATION: aspiration; I63.9-Cerebral infarction, unspecified;  R13.12-Dysphagia, oropharyngeal  phase     COMPARISON: 08/10/2023     FINDINGS:     The cardiac silhouette is normal in size. Stable position of LEFT chest  wall 2-lead cardiac pacing/ICD device. No pleural effusion,  pneumothorax, or focal consolidation. No acute osseous finding.       Impression:         No acute findings.  This report was finalized on 08/12/2023 11:28 by Dr. Collin Gordillo MD.    CT Head Without Contrast [[id number]] Collected: 08/12/23 0836     Updated: 08/12/23 0844    Narrative:      EXAM/TECHNIQUE: CT HEAD WO CONTRAST-     INDICATION: Neuro decline; I63.9-Cerebral infarction, unspecified;  R13.12-Dysphagia, oropharyngeal phase     COMPARISON: Prior day head CT     DLP: 532 mGy cm. Automated exposure control was also utilized to  decrease patient radiation dose.     FINDINGS:     Continued progression of low-density change in the RIGHT MCA frontal  lobe distribution with gray-white differentiation loss, compatible with  continued evolution of acute infarct. Mild surrounding edema is present.  No significant midline shift. No acute intracranial hemorrhage. Lateral  ventricles are nondilated. Basilar cisterns are patent. No acute orbital  finding. Mastoid air cells are clear. Visualized paranasal sinuses are  clear. No acute osseous finding.       Impression:         Continued evolution of RIGHT MCA acute infarct with continued  progression of hypodense parenchymal change in the RIGHT anterior  frontal lobe and slight increase in surrounding edema. No midline shift.  No evidence of intracranial hemorrhage.  This report was finalized on 08/12/2023 08:41 by Dr. Collin Gordillo MD.    CT Head Without Contrast [[id number]] Collected: 08/12/23 0505     Updated: 08/12/23 0511    Narrative:      EXAM/TECHNIQUE: CT HEAD WO CONTRAST-     INDICATION: Stroke, follow up     COMPARISON: 08/10/2023     DLP: 683 mGy cm. Automated exposure control was also utilized to  decrease patient radiation dose.     FINDINGS:     New areas of  hypoattenuation with gray-white differentiation loss in the  RIGHT anterior frontal lobe, correlating with areas of ischemia  identified on prior brain CT perfusion scan, compatible with acute  infarct. No left-sided gray-white differentiation loss.     No acute intracranial hemorrhage. No midline shift or significant mass  effect. Lateral ventricles are nondilated. Basilar cisterns are patent.  No acute orbital finding. Mastoid air cells and visualized portion of  the paranasal sinuses are clear. No acute osseous finding.       Impression:         1.  Evolving acute RIGHT MCA infarct, correlating with area of concern  on prior CT brain perfusion.  2.  No acute intracranial hemorrhage.     These findings are in agreement with the critical and emergent findings  from the [facility] preliminary report.  This report was finalized on 08/12/2023 05:08 by Dr. Collin Gordillo MD.    CT Angiogram Neck [[id number]] Collected: 08/11/23 0600     Updated: 08/11/23 0717    Narrative:      EXAMINATION: CT ANGIOGRAM NECK-      8/10/2023 10:50 PM CDT     HISTORY: Neuro deficit, acute stroke suspected     In order to have a CT radiation dose as low as reasonably achievable  Automated Exposure Control was utilized for adjustment of the mA and/or  KV according to patient size.     DLP in mGycm= 315     The CT angiography of the neck is performed after intravenous contrast  enhancement.     The images are acquired in axial plane and subsequent 2-D reconstruction  in coronal and sagittal planes and 3-D maximum intensity projection  reconstruction.     There is no previous similar study for comparison. Correlation is made  with CT scan of the head obtained earlier today.     Atheromatous changes of the limited visualized thoracic aorta and aortic  arch is seen. No aneurysmal dilatation or stenosis.     There is normal origin course and caliber of the right brachiocephalic,  left common carotid and left subclavian arteries.     The right  brachiocephalic artery divides into normal-appearing  subclavian and right common carotid artery.     Both common carotid arteries are normal in course and caliber in the  neck.     Small calcific plaques are seen in the distal right common carotid  artery. No stenosis.     Both common carotid artery divides into normal-appearing intrarenal  external carotid arteries. There is a small calcific plaque in the  proximal right internal carotid artery without stenosis.     Both internal carotid arteries moderately tortuous and a medialized into  the posterior pharyngeal wall. There is subsequent direct and laterally  and up to the previous of the skull. Normal size internal carotid  arteries are seen at the skull base and in the carotid canals  bilaterally. The intracranial courses reviewed and discussed separately.     Relatively small right and a dominant left vertebral artery arises from  the subclavian arteries. There is subsequent normal course of both  vertebral arteries throughout the neck. A small right and a dominant  left vertebral artery enter the foramen magnum. The right vertebral  artery is significantly attenuated after the origin of the right PICA.  Subsequently both joint to make a normal-sized basilar artery. The  subsequent intracranial courses reviewed and reported with CT  angiography of the head.     The soft tissues of the neck are unremarkable without evidence of mass  or lymphadenopathy.     The limited included in the lungs are unremarkable.       Impression:      1. No hemodynamically significant carotid or vertebral artery stenosis  as detailed above. The CT angiography of the head is reported  separately.  2. NASCET criteria was utilized for estimation of carotid arterial  stenosis.     The above study was initially reviewed and reported by [facility]. I do not  find any discrepancies.                       This report was finalized on 08/11/2023 07:14 by Dr. Talita Matt MD.    CT CEREBRAL  PERFUSION WITH & WITHOUT CONTRAST [[id number]] Collected: 08/11/23 0651     Updated: 08/11/23 0658    Narrative:      CT CEREBRAL PERFUSION W WO CONTRAST- 8/10/2023 10:52 PM CDT     HISTORY: Neuro deficit, acute, stroke suspected     Technique:      1. Perfusion CT is performed to acquire images tracking the temporal  course of iodinated contrast material passing through the cerebral  circulation. Perfusion parameters, such as cerebral blood flow (CBF),  cerebral blood volume (CBV), mean transit time (MTT), etc. are  calculated by [employer] with additional provided perfusion maps and  estimated stroke volumes.   2. Automated exposure control (AEC) protocols are utilized on the  scanner to ensure dose lowered technique.      Comparison: Noncontrast CT brain and brain angiogram dated 8/10/2023  10:52 PM CDT     CT dose: DLP  1632  mGycm     Findings:     Abnormal perfusion exam, with size and distribution of the perfusion  abnormality described below.     Distribution: Perfusion abnormality involves the Right MCA vascular  distribution.     Tmax >6.0 seconds volume: 70 ml     CBF < 30% volume: 36 ml     Mismatch volume: 34 ml      Mismatch ratio: 1.9       Impression:      Impression:  1. Abnormal perfusion exam with acute ischemia as described above.     These findings are in agreement with the critical and emergent findings  from the [facility] preliminary report.  This report was finalized on 08/11/2023 06:55 by  Freddie Ko DO.    CT Angiogram Head w AI Analysis of LVO [[id number]] Collected: 08/11/23 0634     Updated: 08/11/23 0654    Narrative:      EXAM: CT ANGIOGRAM HEAD W AI ANALYSIS OF LVO- - 8/10/2023 10:50 PM CDT     HISTORY: Neuro deficit, acute stroke suspected       COMPARISON: Same day CT head. CT head 2/2/2017..      DOSE LENGTH PRODUCT: 315 mGy cm. Automated exposure control was also  utilized to decrease patient radiation dose.     TECHNIQUE: CTA head performed with intravenous contrast.  3D  postprocessing, including MIPs, performed and images saved to PACS. AI  ANALYSIS OF LVO WAS UTILIZED.  Grading of carotid stenosis performed  with NASCET criteria.     FINDINGS:      Right M2 occlusion (series 4 image 54).     Distal ICAs are patent and without flow-limiting stenosis. . The  bilateral anterior and left middle cerebral arteries are patent and  without flow-limiting stenosis. Intracranial vertebral arteries and  basilar artery are normal in caliber. Posterior cerebral arteries are  patent and normal in caliber. No visualized aneurysm or vascular  malformation.      No intracranial hemorrhage or mass effect. Orbital contents are  unremarkable. . .        Impression:         Right M2 occlusion.     These findings do not agree with the [facility] report and have been  appropriately communicated.     These findings were communicated with Dr. Borges on 8/11/2023 at 6:49 AM.  This report was finalized on 08/11/2023 06:50 by  Freddie Ko DO.    XR Chest 1 View [[id number]] Collected: 08/11/23 0616     Updated: 08/11/23 0620    Narrative:      EXAMINATION: XR CHEST 1 VW-     8/11/2023 12:15 AM CDT     HISTORY: Acute Stroke Protocol (Onset < 12 hrs)     A frontal projection of the chest is compared with the previous study  dated 03/02/2022.     The lungs are moderately expanded.     There is persistent mild elevation of the right diaphragm. No change.     Ill-defined opacity in the lower lungs bilaterally probably represent  scarring and areas of discoid atelectasis.     There is no pleural effusion, pulmonary congestion or pneumothorax.     There is moderate cardiomegaly. The atheromatous change of thoracic  aorta noted. A dual-chamber cardiac pacer defibrillator is in place. No  change.     There is no acute bony abnormality. There is moderate diffuse  osteopenia. The deformity of the left distal clavicle represent a  previous surgery/procedure.       Impression:      1. No active cardiopulmonary  disease.  This report was finalized on 08/11/2023 06:17 by Dr. Talita Matt MD.    CT Head Without Contrast Stroke Protocol [[id number]] Collected: 08/11/23 0523     Updated: 08/11/23 0532    Narrative:      EXAMINATION: CT HEAD WO CONTRAST STROKE PROTOCOL-      8/10/2023 10:49 PM CDT     HISTORY: Neuro deficit, acute, stroke suspected     In order to have a CT radiation dose as low as reasonably achievable  Automated Exposure Control was utilized for adjustment of the mA and/or  KV according to patient size.     DLP in mGycm= 595     The CT scan of the head is performed without intravenous contrast  enhancement.     Images are acquired in axial plane and subsequent reconstruction in  coronal and sagittal planes.     Comparison is made with the previous study dated 02/02/2017.     There is no evidence of a mass. There is no midline shift.     There is no evidence of intracranial hemorrhage or hematoma.     Moderately prominent ventricles, basal cisterns and cortical sulci are  similar to the previous study and represent a chronic volume loss.     A small hyperdensity located posteriorly in the anterior horn of the  left lateral ventricle is similar to the previous study and may  represent a vascular calcification or dystrophic calcification. No  change.     Scattered areas of chronic white matter ischemia bilaterally persist.  The gray-white matter differentiation is maintained.     A partially empty sella turcica seen. No widening or bony erosion.     The images reviewed in bone window show no evidence of skull fracture.  Limited visualized paranasal sinuses and mastoid air cells are clear.       Impression:      1. No acute intracranial abnormality.     The above study was initially reviewed and reported by [facility]. I do not  find any discrepancies.                             This report was finalized on 08/11/2023 05:29 by Dr. Talita Matt MD.          Personal review of imaging : CXR shows ett, aicd ok, no  infiltrates.    Other test results (not lab or imaging): Results for orders placed during the hospital encounter of 08/10/23    Adult Transthoracic Echo Limited W/ Cont if Necessary Per Protocol    Interpretation Summary    Left ventricular systolic function is moderately decreased. Left ventricular ejection fraction appears to be 21 - 25%.    The following left ventricular wall segments are hypokinetic: mid anterior, mid anterolateral, mid inferolateral, mid inferior, basal inferoseptal, mid inferoseptal and basal inferoseptal. The following left ventricular wall segments are akinetic: apical anterior, apical lateral, apical inferior, apical septal and apex.    No evidence of left ventricular mural thrombus.    When carefully compared to images from 8/11/2023, there are subtle differences in wall motion with improved contractility is basal segments, and significant worsening of contractility near the apex.  This would be consistent with Takotsubo cardiomyopathy in the setting of a patient with a previously diagnosed cardiomyopathy of a different etiology.     Independent review of ekg: inferior, lateral st depression    Problem List as identified by Epic (may contain historical, inactive problems)    Stroke    Cardiomyopathy    Chronic pain disorder    Chronic pain due to neoplasm    Primary hypertension    AICD (automatic cardioverter/defibrillator) present    PAF (paroxysmal atrial fibrillation)    Severe malnutrition    Pulmonary Assessment:  SEVERE ACUTE RESPIRATORY FAILURE REQUIRING MECHANICAL VENTILATION.  This is a threat to life or pulmonary function, high risk of morbidity from additional diagnostic testing or treatment, due to acute CHF in setting of stroke, s/p thrombolytics and subsequent bleeding  Respiratory alkalosis, spontaneous  Lactic acidosis due to shock  Elevated liver enzymes due to shock  Stroke  Gi bleed  Takotsubo cardiomyopathy  Hx aicd  Leukocytosis  Anemia, due to acute blood  loss  leukocytosis    Recommend/plan:   Ventilator order set, including intravenous narcotics, benzodiazepines (that is controlled drugs) for sedation and ventilator tolerance  Chest X-ray in the morning tomorrow  Arterial blood gas analysis in the morning tomorrow  Additional plan:  Vent adjustments to optimize Vt and flow to promote synchrony and avoid barotrauma  Discussed with Medicine  Monitor hgb, bp.  Judicious fluid resuscitation in setting of low EF  Discussed with Medicine  Discussed with Cardiology    Thank you for this consult.  We will follow along.  Electronically signed by Janusz Claudio MD on 8/13/2023 at 12:56 CDT

## 2023-08-13 NOTE — NURSING NOTE
2307-Noted rhythm change on monitor, appears to have significant ST elevation as well as existing sinus tachycardia 130-140s. Messaged Dr. Greer to request EKG order as no prn order available.   2337- Order for EKG obained. EKG shows STEMI. Messaged Dr. Greer and informed him of finding and that we would contact cardiology on call.   Primary nurse to contact Dr. Dawkins

## 2023-08-13 NOTE — PLAN OF CARE
Problem: Adult Inpatient Plan of Care  Goal: Plan of Care Review  Outcome: Ongoing, Progressing     Problem: Adult Inpatient Plan of Care  Goal: Absence of Hospital-Acquired Illness or Injury  Intervention: Prevent and Manage VTE (Venous Thromboembolism) Risk  Recent Flowsheet Documentation  Taken 8/13/2023 0000 by Samina Silveira RN  Activity Management: bedrest  Range of Motion: ROM (range of motion) performed  Taken 8/12/2023 2000 by Samina Silveira RN  Activity Management: bedrest     Problem: Adult Inpatient Plan of Care  Goal: Optimal Comfort and Wellbeing  Outcome: Ongoing, Progressing   Goal Outcome Evaluation:      Pt. Remains tachypneic and tachycardic. Withdraws to pain. Inc.of dark red stools.

## 2023-08-13 NOTE — CASE MANAGEMENT/SOCIAL WORK
Discharge Planning Assessment  [facility]     Patient Name: Mitzi Villafuerte  MRN: [id number]  Today's Date: 8/13/2023    Admit Date: 8/10/2023    Plan: Unclear   Discharge Needs Assessment       Row Name 08/13/23 1035       Living Environment    People in Home alone    Current Living Arrangements home    Potentially Unsafe Housing Conditions none    Primary Care Provided by self    Provides Primary Care For no one    Family Caregiver if Needed child(keanu), adult    Quality of Family Relationships helpful;involved;supportive    Able to Return to Prior Arrangements yes       Resource/Environmental Concerns    Resource/Environmental Concerns none    Transportation Concerns none       Transition Planning    Patient/Family Anticipates Transition to home with help/services    Patient/Family Anticipated Services at Transition rehabilitation services    Transportation Anticipated family or friend will provide       Discharge Needs Assessment    Readmission Within the Last 30 Days no previous admission in last 30 days    Anticipated Changes Related to Illness inability to care for self    Equipment Needed After Discharge none    Outpatient/Agency/Support Group Needs skilled nursing facility;inpatient rehabilitation facility    Discharge Facility/Level of Care Needs nursing facility, skilled;rehabilitation facility    Discharge Coordination/Progress PT resides at home alone and has 3 sons who live nearby and assist as needed. TRUNG has spoken with PT's son Bhavik, who has advised that PT was active and independent prior to admission. Bhavik states that if rehab is needed, they will absolutely support that, but prefer home with HH if PT is able. They are not sure yet whether to list with acute rehab or SNF and await therapy evals. TRUNG will follow up after therapy has evaled and will assist as needed.                   Discharge Plan    No documentation.                 Continued Care and Services - Admitted Since 8/10/2023     Coordination has not been started for this encounter.          Demographic Summary    No documentation.                  Functional Status    No documentation.                  Psychosocial    No documentation.                  Abuse/Neglect    No documentation.                  Legal    No documentation.                  Substance Abuse    No documentation.                  Patient Forms    No documentation.                     KELSEY Covarrubias

## 2023-08-13 NOTE — DISCHARGE SUMMARY
[facility] Medicine Services  DEATH SUMMARY       Date of Admission: 8/10/2023  Date of Death:  8/13/2023 at approximately 1513  Primary Care Physician: Urban Main MD    Presenting Problem/History of Present Illness:  Stroke [I63.9]     Final Death Diagnoses:  Active Hospital Problems    Diagnosis     **Stroke     SIRS (systemic inflammatory response syndrome)     Metabolic acidosis     Stress-induced cardiomyopathy     Shock, unspecified     Lactic acidosis     PAF (paroxysmal atrial fibrillation)     Severe malnutrition     AICD (automatic cardioverter/defibrillator) present     Cardiomyopathy     Primary hypertension     Chronic pain disorder     Chronic pain due to neoplasm        Consults:   1.  Neurology  2.  GI  3.  Cardiology  4.  Pulmonology    Procedures Performed: n/a    Pertinent Test Results:   MRI Brain:  IMPRESSION:  Bilateral hemispheric and bilateral cerebellar acute  infarcts involving the carotid and vertebrobasilar circulations. The  largest infarct is in the right frontal lobe where there is sulcal  effacement. There is no significant shifting of midline structures.    Repeat Echocardiogram on 8/13/23:           Left ventricular systolic function is moderately decreased. Left ventricular ejection fraction appears to be 21 - 25%.    The following left ventricular wall segments are hypokinetic: mid anterior, mid anterolateral, mid inferolateral, mid inferior, basal inferoseptal, mid inferoseptal and basal inferoseptal. The following left ventricular wall segments are akinetic: apical anterior, apical lateral, apical inferior, apical septal and apex.    No evidence of left ventricular mural thrombus.    When carefully compared to images from 8/11/2023, there are subtle differences in wall motion with improved contractility is basal segments, and significant worsening of contractility near the apex.  This would be consistent with Takotsubo cardiomyopathy  in the setting of a patient with a previously diagnosed cardiomyopathy of a different etiology.       Hospital Course:  The patient is a 77 y.o. female who presented to [facility] with Left Sided weakness and AMS.  She was found to have an acute stroke.  She was given TNK in the ED.  She was admitted to the ICU.      Neurology followed her for the stroke.  MRI took a day to get due to her AICD.  This showed cerebral edema in addition to bilateral hemispheric and bilateral cerebellar acute strokes.  She had worsening confusion/and lethargy.    She developed tachycardia and worsening leukocytosis.  No definitive source of infection was identified on chest x-ray or urinalysis.  She was treated with broad spectrum IV Abx.      She developed GI Bleeding.  GI was consulted.  They recommended supportive care.      Troponin levels were elevated and continued to rise.  Cardiology was consulted.  Initial echo showed improvement in her ejection fraction compared to most recent echo.  Early morning of 8/13/23 she had cardiac rhythm changes.  EKG shows ST elevation. Echocardiogram showed decreased EF.  Cardiology felt this represented a Takotsubo/Stress-induced Cardiomyopathy.  Given her Neurological status and GI Bleeding, cardiac catheterization was not felt to be of benefit at this time.      Given her declining mental status and increasing respiratory rate, I explained to the family that we would need to intubate.  She was intubated.  She had worsening shock and acidosis.  She was started on multiple vasopressors, but continued to deteriorate.  Family decided to make her comfort measures.  She passed peacefully on 8/13/23 at 1513.        Electronically signed by Babar Villafuerte MD, 08/13/23, 15:50 CDT.    Time: 32 minutes

## 2023-08-14 LAB
QT INTERVAL: 346 MS
QTC INTERVAL: 532 MS

## 2023-08-14 NOTE — THERAPY DISCHARGE NOTE
Acute Care - Speech Language Pathology Discharge Summary  [facility]       Patient Name: Mitzi Villafuerte  : 1945  MRN: [id number]    Today's Date: 2023                   Admit Date: 8/10/2023      SLP Recommendation and Plan    Visit Dx:    ICD-10-CM ICD-9-CM   1. Cerebrovascular accident (CVA), unspecified mechanism  I63.9 434.91   2. Oropharyngeal dysphagia  R13.12 787.22   3. Shock, unspecified  R57.9 785.50                SLP GOALS       Row Name 23 1400             (LTG) Swallow    (LTG) Swallow Patient will tolerate least restrictive diet without s/s of aspiration.  -MG      [place] (Swallow Long Term Goal) independently (over 90% accuracy)  -MG      Time Frame (Swallow Long Term Goal) by discharge  -MG      Barriers (Swallow Long Term Goal) none  -MG      Progress/Outcomes (Swallow Long Term Goal) other (see comments);goal not met    -MG         (STG) Patient will tolerate trials of    Consistencies Trialed (Tolerate trials) soft to chew (ground) textures;honey/ moderately thick liquids;nectar/ mildly thick liquids;thin liquids;regular textures  -MG      Desired Outcome (Tolerate trials) without signs/symptoms of aspiration;without signs of distress;with adequate oral prep/transit/clearance  -MG      [place] (Tolerate trials) independently (over 90% accuracy)  -MG      Time Frame (Tolerate trials) by discharge  -MG      Progress/Outcomes (Tolerate trials) other (see comments);goal not met  -MG         (STG) Labial Strengthening Goal 1 (SLP)    Increase Labial Tone labial resistance exercises;swallow trials  -MG      [place]/Accuracy (Labial Strengthening Goal 1, SLP) independently (over 90% accuracy)  -MG      Time Frame (Labial Strengthening Goal 1, SLP) by discharge  -MG      Barriers (Labial Strengthening Goal 1, SLP) none  -MG      Progress/Outcomes (Labial Strengthening Goal 1, SLP) other (see comments);goal not met  -MG         (STG) Pharyngeal Strengthening  Exercise Goal 1 (SLP)    Activity (Pharyngeal Strengthening Goal 1, SLP) increase timing  -MG      Increase Timing gustatory stimulation (sour/cold)  -MG      [place]/Accuracy (Pharyngeal Strengthening Goal 1, SLP) independently (over 90% accuracy)  -MG      Time Frame (Pharyngeal Strengthening Goal 1, SLP) by discharge  -MG      Barriers (Pharyngeal Strengthening Goal 1, SLP) none  -MG      Progress/Outcomes (Pharyngeal Strengthening Goal 1, SLP) other (see comments);goal not met  -MG                User Key  (r) = Recorded By, (t) = Taken By, (c) = Cosigned By      Initials Name Provider Type    Milagro Fernandez MS CCC-SLP Speech and Language Pathologist                            SLP Discharge Summary  Anticipated Discharge Disposition (SLP): unknown      Milagro Maynard MS CCC-SLP  8/14/2023

## 2023-08-14 NOTE — PROGRESS NOTES
[facility] Medicine Services  INPATIENT PROGRESS NOTE    Patient Name: Mitzi Villafuerte  Date of Admission: 8/10/2023  Today's Date: 08/13/23  Length of Stay: 2  Primary Care Physician: Urban Main MD    Subjective   Chief Complaint: follow up stroke  HPI   I have seen the patient on multiple occasions today.  Due to her unfortunate decline, I have spent a great deal of time at the bedside.    EKG early this AM showed ST Elevation.  Unfortunately due to GI Bleeding, and worsening neurological status, she would not benefit from cardiac catheterization.      I have had multiple conversations throughout the day with her family.  They have requested everything be done with the exception of CPR.  I explained we would need to intubate, and they agreed to that.      The patient lost IV Access.  Due to her severe encephalopathy, we were able to intubate without medications.  Dr. Lal attempted a central line and the guide wire would not pass.  Dr. Ortiz of the ER attempted a central line and had the same issues.  Dr. France was kind enough to come try and was successful in replacing a right femoral line.  The patient had profound hypotension and Acidosis.  She was started on Levophed and Vasopressin.  She was being bolused with small amounts of IVF given her new worsening of EF.  She was started on stress dose steroids.  She was started on Bicarbonate and Thiamine.          Review of Systems   Unable to perform ROS: Mental status change        All pertinent negatives and positives are as above. All other systems have been reviewed and are negative unless otherwise stated.     Objective    Temp:  [98.2 øF (36.8 øC)-98.6 øF (37 øC)] 98.6 øF (37 øC)  Heart Rate:  [0-176] 0  Resp:  [44] 44  BP: ()/(14-74) 77/65  FiO2 (%):  [100 %] 100 %  Physical Exam  Constitutional:       Appearance: Normal appearance. She is well-developed.      Interventions: She is intubated.   HENT:       Head: Normocephalic and atraumatic.      Right Ear: Tympanic membrane, ear canal and external ear normal.      Left Ear: Tympanic membrane, ear canal and external ear normal.      Nose: Nose normal.      Mouth/Throat:      Mouth: Mucous membranes are moist.      Pharynx: Oropharynx is clear.   Eyes:      Extraocular Movements: Extraocular movements intact.      Conjunctiva/sclera: Conjunctivae normal.      Pupils: Pupils are equal, round, and reactive to light.   Cardiovascular:      Rate and Rhythm: Normal rate and regular rhythm.      Heart sounds: Normal heart sounds.   Pulmonary:      Effort: Pulmonary effort is normal. She is intubated.      Breath sounds: Normal breath sounds.   Abdominal:      General: Bowel sounds are normal. There is no distension.      Palpations: Abdomen is soft.      Tenderness: There is no abdominal tenderness.   Musculoskeletal:         General: Normal range of motion.      Cervical back: Normal range of motion and neck supple.   Skin:     General: Skin is warm and dry.   Neurological:      Mental Status: She is unresponsive.           Results Review:  I have reviewed the labs, radiology results, and diagnostic studies.    Laboratory Data:   Results from last 7 days   Lab Units 08/13/23  0747 08/12/23  1836 08/12/23  0932 08/11/23  1812 08/10/23  2250   WBC 10*3/mm3 27.74*  --  21.59*  --  11.97*   HEMOGLOBIN g/dL 8.8* 8.1* 8.0*   < > 8.7*   HEMATOCRIT % 31.0* 28.5* 27.0*   < > 30.9*   PLATELETS 10*3/mm3 158  --  286  --  275    < > = values in this interval not displayed.          Results from last 7 days   Lab Units 08/13/23  0747 08/12/23  0932 08/10/23  2250   SODIUM mmol/L 142 139 139   POTASSIUM mmol/L 3.3* 3.6 3.0*   CHLORIDE mmol/L 107 104 102   CO2 mmol/L 17.0* 18.0* 26.0   BUN mg/dL 25* 15 16   CREATININE mg/dL 1.11* 0.45* 0.60   CALCIUM mg/dL 9.0 8.3* 8.4*   BILIRUBIN mg/dL 0.5 0.4 0.3   ALK PHOS U/L 126* 130* 118*   ALT (SGPT) U/L 71* 20 12   AST (SGOT) U/L 286* 116* 37*    GLUCOSE mg/dL 190* 146* 111*         Culture Data:   No results found for: BLOODCX, URINECX, WOUNDCX, MRSACX, RESPCX, STOOLCX    Radiology Data:   Imaging Results (Last 24 Hours)       Procedure Component Value Units Date/Time    XR Chest 1 View [[id number]] Collected: 08/13/23 1438     Updated: 08/13/23 1442    Narrative:      EXAM/TECHNIQUE: XR CHEST 1 VW-     INDICATION: et placement; I63.9-Cerebral infarction, unspecified;  R13.12-Dysphagia, oropharyngeal phase; R57.9-Shock, unspecified     COMPARISON: 08/13/2023     FINDINGS:     Endotracheal tube is 2.6 cm above the caro. Enteric tube terminates  below the diaphragm out of the field of view. There is some mild  increased opacity both lung bases which is likely related to  atelectasis. No pleural effusion or visible pneumothorax. No acute  osseous finding.       Impression:         1.  Endotracheal tube appears in good position.  2.  Mild bibasilar atelectasis.  This report was finalized on 08/13/2023 14:39 by Dr. Collin Gordillo MD.    XR Abdomen KUB [[id number]] Collected: 08/13/23 1437     Updated: 08/13/23 1441    Narrative:      EXAM/TECHNIQUE: XR ABDOMEN KUB-     INDICATION: OG placement; I63.9-Cerebral infarction, unspecified;  R13.12-Dysphagia, oropharyngeal phase; R57.9-Shock, unspecified     COMPARISON: None     FINDINGS:     Enteric tube is in the mid stomach. Bowel gas pattern appears  nonobstructive. No mass effect. Partially imaged cardiac ICD/pacing  device. No acute osseous finding.       Impression:         Enteric tube tip is in the mid stomach.  This report was finalized on 08/13/2023 14:37 by Dr. Collin Gordillo MD.    XR Chest 1 View [[id number]] Collected: 08/13/23 0750     Updated: 08/13/23 0755    Narrative:      EXAM/TECHNIQUE: XR CHEST 1 VW-     INDICATION: increased respiratory effort; I63.9-Cerebral infarction,  unspecified; R13.12-Dysphagia, oropharyngeal phase     COMPARISON: 08/12/2023     FINDINGS:     Cardiac silhouette is within  normal limits and stable. LEFT chest wall  cardiac pacing/ICD device is stable in position. No pleural effusion or  pneumothorax. No focal area of consolidation.       Impression:         No acute finding or significant change.  This report was finalized on 08/13/2023 07:52 by Dr. Collin Gordillo MD.            I have reviewed the patient's current medications.     Assessment/Plan   Assessment  Active Hospital Problems    Diagnosis     **Stroke     PAF (paroxysmal atrial fibrillation)     Severe malnutrition     AICD (automatic cardioverter/defibrillator) present     Cardiomyopathy     Primary hypertension     Chronic pain disorder     Chronic pain due to neoplasm        Treatment Plan  1.  Stroke s/p TNK  -Statin  -No ASA/AC given bleeding  -Neuro following    2.  GI Bleeding  -GI consulted, no plans for EGD/Colonoscopy  -Protonix  -Transfuse as indicated    3.  Takotsubo Cardiomyopathy  -Cardiology following    4.  HTN  -monitor    5.  A-fib  -monitor  -no rate lowering meds given shock  -no AC given bleeding    6.  Chronic Systolic CHF  -monitor for volume overload    7.  Shock  -multi-factorial  -Levophed  -Vasopressin  -Bolus IVF--will do small boluses given EF  -Cipriano  -Stress dose Steroids    I spent 60 minutes providing critical care management to this patient. This excludes time spent in performing separately billed procedures.  Time spent reviewing labs/vitals/evaluating patient.  Time spent discussing patient with other physicians.  Time spent discussing condition with family.  Time spent managing fluid boluses, vasopressors.  Time spent to prevent deterioration and death due to life threatening shock.        Medical Decision Making  Number and Complexity of problems: 7 problems, high complexity--acute Stroke represents threat to function    Risk:  Moderate rx drug management    Differential Diagnosis: Stroke,     Conditions and Status        Condition is worsening.     MDM Data  1:   Tests/Documents/Independent Historians:  A:  Prior external notes from unique source reviewed:  B.  Review of the Results of Each Unique Test: (3) CBC, CMP, Troponin, Lactic Acid  C.  Assessment requiring an independent Historian:     2.  Independent interpretation of Tests:   A.  Radiology Interpretation:   B:  EKG/Telemetry Interpretation:     3:  Discussion of management or Test interpretation  (1) Discussed with Dr. Benjamin of Neurology  (1) Discussed with Dr. Dawkins of Cardiology    Care Planning  Shared decision making: family agreeable to treatment plan  Code status and discussions:     Disposition  Social Determinants of Health that impact treatment or disposition: n/a  I expect the patient to be discharged to rehab  in ? days.     Electronically signed by Babar Villafuerte MD, 08/13/23, 20:11 CDT.

## 2023-08-16 LAB
QT INTERVAL: 324 MS
QTC INTERVAL: 482 MS

## 2023-08-17 LAB — BACTERIA SPEC AEROBE CULT: NORMAL

## 2023-08-22 PROBLEM — R57.9 SHOCK, UNSPECIFIED: Status: ACTIVE | Noted: 2023-08-22

## 2023-08-22 PROBLEM — E87.20 METABOLIC ACIDOSIS: Status: ACTIVE | Noted: 2023-08-22

## 2023-08-22 PROBLEM — R65.10 SIRS (SYSTEMIC INFLAMMATORY RESPONSE SYNDROME): Status: ACTIVE | Noted: 2023-08-22

## 2023-08-22 PROBLEM — I51.81 STRESS-INDUCED CARDIOMYOPATHY: Status: ACTIVE | Noted: 2023-08-22

## 2023-08-22 PROBLEM — E87.20 LACTIC ACIDOSIS: Status: ACTIVE | Noted: 2023-08-22

## 2023-11-02 ENCOUNTER — CALL CENTER PROGRAMS (OUTPATIENT)
Dept: CALL CENTER | Facility: HOSPITAL | Age: 78
End: 2023-11-02
Payer: COMMERCIAL

## 2023-11-02 NOTE — OUTREACH NOTE
Stroke [facility] Survey      Flowsheet Row Responses   Facility patient discharged from? [place]   Attempt successful No   Revoke    Date of death if known 23   Did the patient die within 30 days of admission? Yes   Cause of death if known Unknown   Patient  score    [facility] [place] Score 6            HUE ISLAS - Registered Nurse

## (undated) DEVICE — AIRWAY CIRCUIT: Brand: DEROYAL

## (undated) DEVICE — SUT GUT CHRM 2/0 CT1 36IN 923H

## (undated) DEVICE — CHLORAPREP 26ML ORANGE

## (undated) DEVICE — PK PM 30

## (undated) DEVICE — SUTURE VCRL SZ 2-0 L27IN ABSRB UD L26MM SH 1/2 CIR J417H

## (undated) DEVICE — ADHS SKIN PREMIERPRO EXOFIN TOPICAL HI/VISC .5ML

## (undated) DEVICE — BANDAGE ESMARK 4IN ST

## (undated) DEVICE — Device

## (undated) DEVICE — UNDERCAST PADDING: Brand: DEROYAL

## (undated) DEVICE — GW 3.2X475MM

## (undated) DEVICE — EACH VASCULAR SOLUTIONS D-STAT® FLOWABLE HEMOSTAT (D-STAT) INCLUDES THE FOLLOWING COMPONENTS: THROMBIN VIAL (5,000 UNITS); COLLAGEN (200MG), CONTAINED IN 10ML SYRINGE WITH ATTACHED MIXING LUER; DILUENT VIAL (5ML); MIXING ACCESSORIES (10ML SYRINGE AND NEEDLELESS, NON-CORING VIAL ACCESS DEVICE); APPLICATOR TIPS: (1) SMALL BORE TIP, (1) 20-GAUGE, 2.75" NEEDLE.THE THROMBIN IS A PROTEIN SUBSTANCE PRODUCED THROUGH A CONVERSION REACTION IN WHICH PROTHROMBIN OF BOVINE ORIGIN IS ACTIVATED BY TISSUE THROMBOPLASTIN OF BOVINE-ORIGIN IN THE PRESENCE OF CALCIUM CHLORIDE. IT IS SUPPLIED AS A STERILE POWDER THAT HAS BEEN FREEZE-DRIED IN THE FINAL CONTAINER. ALSO CONTAINED IN THE THROMBIN VIAL ARE MANNITOL AND SODIUM CHLORIDE. MANNITOL IS INCLUDED TO MAKE THE DRIED PRODUCT FRIABLE AND MORE READILY SOLUBLE. THE MATERIAL CONTAINS NO PRESERVATIVE AND HAS BEEN CHROMATOGRAPHICALLY PURIFIED. THROMBIN REQUIRES NO INTERMEDIATE PHYSIOLOGICAL AGENT FOR ITS REACTION. IT CONVERTS FIBRINOGEN DIRECTLY TO FIBRIN.THE COLLAGEN IS A SOFT, WHITE, PLIABLE, ABSORBENT HEMOSTATIC AGENT DERIVED FROM PURIFIED BOVINE DEEP FLEXOR TENDON. IT IS PREPARED IN A LOOSE FIBROUS FORM. COLLAGEN ATTRACTS PLATELETS THAT ADHERE TO THE FIBRILS AND UNDERGO THE RELEASE PHENOMENON TO TRIGGER AGGREGATION OF PLATELETS INTO THROMBI IN THE INTERSTICES OF THE FIBROUS MASS. THE COLLAGEN PROVIDES A THREE-DIMENSIONAL MATRIX FOR ADDITIONAL STRENGTHENING OF THE CLOT. THE EFFECT ON PLATELET ADHESION AND AGGREGATION IS NOT INHIBITED BY HEPARIN IN VITRO.THE DILUENT IS A STERILE SOLUTION OF CALCIUM CHLORIDE AND WATER, BUFFERED WITH TROMETHAMINE (TRIS). USING THE MIXING ACCESSORIES, BOTH THE THROMBIN AND COLLAGEN ARE RECONSTITUTED WITH THE DILUENT PRIOR TO USE. THE HEMOSTAT IS DELIVERED TO THE INTENDED TREATMENT SITE USING THE PROVIDED APPLICATOR TIPS. HEMOSTASIS IS ACHIEVED BY THE PHYSIOLOGICAL COAGULATION-INDUCING PROPERTIES OF THE D-STAT. THE D-STAT IS BIOCOMPATIBLE, NON-PYROGENIC, AND INTENDED TO BE LEFT IN SITU.: Brand: D-STAT® FLOWABLE HEMOSTAT

## (undated) DEVICE — GLV SURG TRIUMPH MICRO PF LTX 8 STRL

## (undated) DEVICE — AMBU AURAONCE U SIZE 3: Brand: AURAONCE

## (undated) DEVICE — WIPE THERAWASH SLV SPEC CARE 2PK

## (undated) DEVICE — INTRO TEAR AWAY/LVD W/SD PRT 8F 13CM

## (undated) DEVICE — GAUZE SPONGES,12 PLY: Brand: CURITY

## (undated) DEVICE — APPL CHLORAPREP HI/LITE 26ML ORNG

## (undated) DEVICE — SOL NS 500ML

## (undated) DEVICE — CURITY NON-ADHERENT STRIPS: Brand: CURITY

## (undated) DEVICE — SUTURE ETHLN SZ 4-0 L18IN NONABSORBABLE BLK L19MM PS-2 3/8 1667H

## (undated) DEVICE — STERILE LATEX POWDER FREE SURGICAL GLOVES WITH HYDROGEL COATING: Brand: PROTEXIS

## (undated) DEVICE — VELCLOSE LATEX FREE VELCRO ELASTIC BANDAGE 4INX5YD: Brand: VELCLOSE

## (undated) DEVICE — U-DRAPE: Brand: CONVERTORS

## (undated) DEVICE — MASK LG ADLT ANES MEDICHOICE

## (undated) DEVICE — SKIN MARKER,REGULAR TIP WITH RULER: Brand: DEVON

## (undated) DEVICE — GLV SURG TRIUMPH MICRO PF LTX 7.5 STRL

## (undated) DEVICE — TIBURON EXTREMITY SHEET: Brand: CONVERTORS

## (undated) DEVICE — PATIENT RETURN ELECTRODE, SINGLE-USE, CONTACT QUALITY MONITORING, ADULT, WITH 9FT CORD, FOR PATIENTS WEIGING OVER 33LBS. (15KG): Brand: MEGADYNE

## (undated) DEVICE — PK HIP ORIF FX TABL 30

## (undated) DEVICE — PROXIMATE RH ROTATING HEAD SKIN STAPLERS (35 REGULAR) CONTAINS 35 STAINLESS STEEL STAPLES: Brand: PROXIMATE

## (undated) DEVICE — 3M™ STERI-STRIP™ REINFORCED ADHESIVE SKIN CLOSURES, R1546, 1/4 IN X 4 IN (6 MM X 100 MM), 10 STRIPS/ENVELOPE: Brand: 3M™ STERI-STRIP™

## (undated) DEVICE — DISPOSABLE SURGICAL CABLE

## (undated) DEVICE — PAD, DEFIB, ADULT, RADIOTRANS, PHILIPS: Brand: MEDLINE

## (undated) DEVICE — BNDG ELAS CO-FLEX SLF ADHR 6IN 5YD LF STRL

## (undated) DEVICE — GOLDVAC PUSH BUTTON ELECTROSURGICAL SMOKE EVACUATION HANDPIECE: Brand: GOLDVAC

## (undated) DEVICE — GW FOR TROCH NAIL 3.2X400MM

## (undated) DEVICE — PK TURNOVER RM ADV

## (undated) DEVICE — SOL IRR NACL 0.9PCT BT 1000ML

## (undated) DEVICE — MAJOR BSIN SETUP PK

## (undated) DEVICE — INTRO TEAR AWAY/LVD W/SD PRT 6F 13CM